# Patient Record
Sex: FEMALE | Race: WHITE | Employment: OTHER | URBAN - METROPOLITAN AREA
[De-identification: names, ages, dates, MRNs, and addresses within clinical notes are randomized per-mention and may not be internally consistent; named-entity substitution may affect disease eponyms.]

---

## 2017-02-14 ENCOUNTER — HOSPITAL ENCOUNTER (OUTPATIENT)
Dept: GENERAL RADIOLOGY | Age: 77
Discharge: HOME | End: 2017-02-14
Attending: INTERNAL MEDICINE
Payer: MEDICARE

## 2017-02-14 ENCOUNTER — TELEPHONE (OUTPATIENT)
Dept: INTERNAL MEDICINE CLINIC | Facility: CLINIC | Age: 77
End: 2017-02-14

## 2017-02-14 ENCOUNTER — HOSPITAL ENCOUNTER (OUTPATIENT)
Dept: GENERAL RADIOLOGY | Age: 77
Discharge: HOME | End: 2017-02-14
Attending: INTERNAL MEDICINE | Admitting: INTERNAL MEDICINE
Payer: MEDICARE

## 2017-02-14 ENCOUNTER — OFFICE VISIT (OUTPATIENT)
Dept: INTERNAL MEDICINE CLINIC | Facility: CLINIC | Age: 77
End: 2017-02-14

## 2017-02-14 ENCOUNTER — HOSPITAL ENCOUNTER (OUTPATIENT)
Dept: ULTRASOUND IMAGING | Facility: HOSPITAL | Age: 77
Discharge: HOME | End: 2017-02-14
Attending: INTERNAL MEDICINE
Payer: MEDICARE

## 2017-02-14 VITALS
TEMPERATURE: 98 F | WEIGHT: 187.38 LBS | HEART RATE: 79 BPM | BODY MASS INDEX: 33.2 KG/M2 | HEIGHT: 63 IN | OXYGEN SATURATION: 95 % | SYSTOLIC BLOOD PRESSURE: 132 MMHG | DIASTOLIC BLOOD PRESSURE: 84 MMHG

## 2017-02-14 DIAGNOSIS — M25.562 CHRONIC PAIN OF LEFT KNEE: ICD-10-CM

## 2017-02-14 DIAGNOSIS — G89.29 CHRONIC PAIN OF LEFT KNEE: ICD-10-CM

## 2017-02-14 DIAGNOSIS — I26.99 OTHER PULMONARY EMBOLISM WITHOUT ACUTE COR PULMONALE, UNSPECIFIED CHRONICITY (HCC): ICD-10-CM

## 2017-02-14 DIAGNOSIS — M25.461 BILATERAL KNEE SWELLING: ICD-10-CM

## 2017-02-14 DIAGNOSIS — M25.462 BILATERAL KNEE SWELLING: ICD-10-CM

## 2017-02-14 DIAGNOSIS — I10 HYPERTENSION, BENIGN: ICD-10-CM

## 2017-02-14 DIAGNOSIS — M25.562 PAIN, JOINT, KNEE, LEFT: ICD-10-CM

## 2017-02-14 DIAGNOSIS — Z86.718 HISTORY OF DVT (DEEP VEIN THROMBOSIS): ICD-10-CM

## 2017-02-14 DIAGNOSIS — Z86.718 HISTORY OF DVT (DEEP VEIN THROMBOSIS): Primary | ICD-10-CM

## 2017-02-14 DIAGNOSIS — M25.562 ACUTE PAIN OF LEFT KNEE: ICD-10-CM

## 2017-02-14 PROCEDURE — G0463 HOSPITAL OUTPT CLINIC VISIT: HCPCS | Performed by: INTERNAL MEDICINE

## 2017-02-14 PROCEDURE — 20610 DRAIN/INJ JOINT/BURSA W/O US: CPT | Performed by: INTERNAL MEDICINE

## 2017-02-14 PROCEDURE — 99214 OFFICE O/P EST MOD 30 MIN: CPT | Performed by: INTERNAL MEDICINE

## 2017-02-14 PROCEDURE — 93970 EXTREMITY STUDY: CPT

## 2017-02-14 PROCEDURE — 73562 X-RAY EXAM OF KNEE 3: CPT

## 2017-02-14 NOTE — TELEPHONE ENCOUNTER
Pt had x-ray of left knee the technician told pt she thought knee was swollen, pt is concerned because she has had a blood clot in that leg in the past she is experiencing pain.

## 2017-02-14 NOTE — TELEPHONE ENCOUNTER
[facility] called asking to clarify orders of knees. XR left knee (4 views) and XR right knee (3 views) was ordered. Per XR tech, typically 3 views is only done. Did Dr. Alves Going want to specifically do 4 views L knee for a particular reason?  Reviewed Dr. Julianne Thao no

## 2017-02-15 NOTE — PROGRESS NOTES
Marquis Morgan is a 78year old female. HPI:   She has had progressive pain in the lateral left knee, much worse today. She thought there was swelling in the knee and some of the pain went down into the left calf. No pain in the right knee.  . She has hist Social History:    Smoking Status: Never Smoker                      Smokeless Status: Never Used                        Alcohol Use: No                 REVIEW OF SYSTEMS:   GENERAL HEALTH: feels well otherwise  SKIN: denies any unusual skin lesions or r

## 2017-02-17 RX ORDER — NIFEDIPINE 60 MG/1
TABLET, FILM COATED, EXTENDED RELEASE ORAL
Qty: 90 TABLET | Refills: 3 | Status: SHIPPED | OUTPATIENT
Start: 2017-02-17 | End: 2018-03-09

## 2017-02-21 ENCOUNTER — PRIOR ORIGINAL RECORDS (OUTPATIENT)
Dept: OTHER | Age: 77
End: 2017-02-21

## 2017-03-07 ENCOUNTER — TELEPHONE (OUTPATIENT)
Dept: INTERNAL MEDICINE CLINIC | Facility: CLINIC | Age: 77
End: 2017-03-07

## 2017-03-07 DIAGNOSIS — Z12.31 ENCOUNTER FOR SCREENING MAMMOGRAM FOR BREAST CANCER: Primary | ICD-10-CM

## 2017-03-10 NOTE — TELEPHONE ENCOUNTER
Pt called for update on mammo order.  Told pt that order has been completed and ready to call to corin

## 2017-03-14 ENCOUNTER — HOSPITAL ENCOUNTER (OUTPATIENT)
Dept: MAMMOGRAPHY | Age: 77
Discharge: HOME | End: 2017-03-14
Attending: INTERNAL MEDICINE
Payer: MEDICARE

## 2017-03-14 DIAGNOSIS — Z12.31 ENCOUNTER FOR SCREENING MAMMOGRAM FOR BREAST CANCER: ICD-10-CM

## 2017-03-14 PROCEDURE — 77067 SCR MAMMO BI INCL CAD: CPT

## 2017-04-12 ENCOUNTER — TELEPHONE (OUTPATIENT)
Dept: INTERNAL MEDICINE CLINIC | Facility: CLINIC | Age: 77
End: 2017-04-12

## 2017-04-12 ENCOUNTER — LAB ENCOUNTER (OUTPATIENT)
Dept: LAB | Facility: HOSPITAL | Age: 77
End: 2017-04-12
Attending: INTERNAL MEDICINE
Payer: MEDICARE

## 2017-04-12 DIAGNOSIS — E78.00 HYPERCHOLESTEROLEMIA: ICD-10-CM

## 2017-04-12 DIAGNOSIS — N39.0 URINARY TRACT INFECTION WITHOUT HEMATURIA, SITE UNSPECIFIED: Primary | ICD-10-CM

## 2017-04-12 DIAGNOSIS — I26.99 OTHER ACUTE PULMONARY EMBOLISM WITHOUT ACUTE COR PULMONALE (HCC): ICD-10-CM

## 2017-04-12 DIAGNOSIS — I10 HYPERTENSION, BENIGN: ICD-10-CM

## 2017-04-12 DIAGNOSIS — K22.70 BARRETT'S ESOPHAGUS WITHOUT DYSPLASIA: ICD-10-CM

## 2017-04-12 PROCEDURE — 36415 COLL VENOUS BLD VENIPUNCTURE: CPT

## 2017-04-12 PROCEDURE — 85379 FIBRIN DEGRADATION QUANT: CPT

## 2017-04-12 PROCEDURE — 80061 LIPID PANEL: CPT

## 2017-04-12 PROCEDURE — 80053 COMPREHEN METABOLIC PANEL: CPT

## 2017-04-12 PROCEDURE — 84443 ASSAY THYROID STIM HORMONE: CPT

## 2017-04-12 PROCEDURE — 85025 COMPLETE CBC W/AUTO DIFF WBC: CPT

## 2017-04-12 PROCEDURE — 81001 URINALYSIS AUTO W/SCOPE: CPT

## 2017-04-12 NOTE — TELEPHONE ENCOUNTER
Imp- pyuria; urinalysis with +Pyuria; urine culture ordered; does pt have +urine frequency, +dysuria, or +polyuria; if yes, after urine culture submitted, may give Macrobid 100 mg po BID x7d and then may call Rx; if negative urinary review of systems, then

## 2017-04-12 NOTE — TELEPHONE ENCOUNTER
Called patient , as RN started talking call got disconnected. Called again and relayed DR[facility] MANCERA message [facility]

## 2017-04-12 NOTE — TELEPHONE ENCOUNTER
Called patient who states she does not have UTI systems - will go for Urine culture and await result

## 2017-04-13 ENCOUNTER — TELEPHONE (OUTPATIENT)
Dept: INTERNAL MEDICINE CLINIC | Facility: CLINIC | Age: 77
End: 2017-04-13

## 2017-04-13 NOTE — TELEPHONE ENCOUNTER
Pt returned call and was given lab results. Also instructed to call Dr. Elder Blackman next week to see if he wants repeat lab work done. Pt scheduled an appt with Dr. Amber Montez for 4/17/2017.

## 2017-04-13 NOTE — TELEPHONE ENCOUNTER
Blood sugar slightly elevated - just watch conc sweets and excessive carbs.     Blood clot test (d-dimer) is elevated - send copy of labs to Dr Hollie Patton, [address]  (see Onc consults in Media from last Fall for Fax, etc)  - ask patient to call Dr Barbara Long

## 2017-04-13 NOTE — TELEPHONE ENCOUNTER
Copy of patients 4/12/17 lab results faxed to DR. Jurgen Romero ([facility]) at [phone].  Left message with family member for patient to call us back (was told patient at [address] answer at cell number)

## 2017-04-14 ENCOUNTER — APPOINTMENT (OUTPATIENT)
Dept: LAB | Facility: HOSPITAL | Age: 77
End: 2017-04-14
Attending: INTERNAL MEDICINE
Payer: MEDICARE

## 2017-04-14 DIAGNOSIS — N39.0 URINARY TRACT INFECTION WITHOUT HEMATURIA, SITE UNSPECIFIED: ICD-10-CM

## 2017-04-14 PROCEDURE — 87086 URINE CULTURE/COLONY COUNT: CPT

## 2017-04-17 ENCOUNTER — OFFICE VISIT (OUTPATIENT)
Dept: INTERNAL MEDICINE CLINIC | Facility: CLINIC | Age: 77
End: 2017-04-17

## 2017-04-17 VITALS
BODY MASS INDEX: 32.78 KG/M2 | HEIGHT: 63 IN | OXYGEN SATURATION: 98 % | SYSTOLIC BLOOD PRESSURE: 128 MMHG | HEART RATE: 80 BPM | DIASTOLIC BLOOD PRESSURE: 76 MMHG | WEIGHT: 185 LBS | TEMPERATURE: 98 F

## 2017-04-17 DIAGNOSIS — M54.50 ACUTE MIDLINE LOW BACK PAIN WITHOUT SCIATICA: ICD-10-CM

## 2017-04-17 DIAGNOSIS — I10 HYPERTENSION, BENIGN: ICD-10-CM

## 2017-04-17 DIAGNOSIS — I26.99 OTHER PULMONARY EMBOLISM WITHOUT ACUTE COR PULMONALE, UNSPECIFIED CHRONICITY (HCC): Primary | ICD-10-CM

## 2017-04-17 DIAGNOSIS — E78.00 HYPERCHOLESTEROLEMIA: ICD-10-CM

## 2017-04-17 PROCEDURE — G0463 HOSPITAL OUTPT CLINIC VISIT: HCPCS | Performed by: INTERNAL MEDICINE

## 2017-04-17 PROCEDURE — 99214 OFFICE O/P EST MOD 30 MIN: CPT | Performed by: INTERNAL MEDICINE

## 2017-04-17 NOTE — PROGRESS NOTES
Perlita Gonzáles is a 78year old female. HPI:   Generally she is feeling well - only complaint is low back pain that started about 1 week ago - no fall or injury - no radiation of pain. SR: no chest pain or sob, no gu or gi sx.         1. Other pulmona denies chest pain on exertion  GI: denies abdominal pain and denies heartburn  NEURO: denies headaches    EXAM:   /76 mmHg  Pulse 80  Temp(Src) 97.8 °F (36.6 °C) (Oral)  Ht 5' 3\" (1.6 m)  Wt 185 lb (83.915 kg)  BMI 32.78 kg/m2  SpO2 98%  GENERAL: we

## 2017-05-01 RX ORDER — POTASSIUM CHLORIDE 750 MG/1
10 TABLET, EXTENDED RELEASE ORAL EVERY OTHER DAY
Qty: 45 TABLET | Refills: 1 | Status: SHIPPED | OUTPATIENT
Start: 2017-05-01 | End: 2017-10-19

## 2017-05-01 NOTE — TELEPHONE ENCOUNTER
[phone]  Pt states she is taking Potassium once every other day.  Need new script sent to [facility]  To Rx

## 2017-05-01 NOTE — TELEPHONE ENCOUNTER
Per 10/21/16 office visit note,  requested that patient \"resume KCl 10 meq every other day\". Refill sent.

## 2017-05-10 ENCOUNTER — PRIOR ORIGINAL RECORDS (OUTPATIENT)
Dept: OTHER | Age: 77
End: 2017-05-10

## 2017-05-10 ASSESSMENT — PAIN SCALES - GENERAL: PAINLEVEL_OUTOF10: 0

## 2017-06-06 ENCOUNTER — TELEPHONE (OUTPATIENT)
Dept: INTERNAL MEDICINE CLINIC | Facility: CLINIC | Age: 77
End: 2017-06-06

## 2017-06-06 NOTE — TELEPHONE ENCOUNTER
To Dr. Juan Monsivais, please advise    Pt states of pain in the same area, rates as 7 out of 10 pain level but this is not consistent. She will be leaving for a cruise in [place] in a week and a half.  She will be gone for 2 weeks

## 2017-06-06 NOTE — TELEPHONE ENCOUNTER
Pt had a shot in knee by dr. Demian Quintana in February     Pt is calling asking - is it too soon to get another shot?      plz call and let her know if she can come in or not     [phone]

## 2017-06-06 NOTE — TELEPHONE ENCOUNTER
Patient contacted, she will see  at the [facility] location on Friday at [address].

## 2017-06-09 ENCOUNTER — OFFICE VISIT (OUTPATIENT)
Dept: INTERNAL MEDICINE CLINIC | Facility: CLINIC | Age: 77
End: 2017-06-09

## 2017-06-09 VITALS
HEIGHT: 63 IN | DIASTOLIC BLOOD PRESSURE: 80 MMHG | OXYGEN SATURATION: 93 % | WEIGHT: 187 LBS | TEMPERATURE: 99 F | HEART RATE: 74 BPM | SYSTOLIC BLOOD PRESSURE: 142 MMHG | BODY MASS INDEX: 33.13 KG/M2

## 2017-06-09 DIAGNOSIS — I10 HYPERTENSION, BENIGN: ICD-10-CM

## 2017-06-09 DIAGNOSIS — M25.562 ACUTE PAIN OF LEFT KNEE: Primary | ICD-10-CM

## 2017-06-09 PROCEDURE — 99213 OFFICE O/P EST LOW 20 MIN: CPT | Performed by: INTERNAL MEDICINE

## 2017-06-09 RX ORDER — SCOLOPAMINE TRANSDERMAL SYSTEM 1 MG/1
1 PATCH, EXTENDED RELEASE TRANSDERMAL
Qty: 4 PATCH | Refills: 1 | Status: SHIPPED | OUTPATIENT
Start: 2017-06-09 | End: 2017-09-19

## 2017-06-09 RX ORDER — OMEPRAZOLE 10 MG/1
10 CAPSULE, DELAYED RELEASE ORAL DAILY
COMMUNITY
End: 2017-09-19

## 2017-06-09 NOTE — PROGRESS NOTES
Autumn Bowman is a 78year old female. HPI:   1. Acute pain of left knee - pain left lateral knee, can radiate to top of knee and even medial knee. She had excellent response to steroid injection 4 mo ago and seeks another injection      2.  Hypertension, with exertion  CARDIOVASCULAR: denies chest pain on exertion  GI: denies abdominal pain and denies heartburn  NEURO: denies headaches    EXAM:   /80 mmHg  Pulse 74  Temp(Src) 98.9 °F (37.2 °C) (Oral)  Ht 5' 3\" (1.6 m)  Wt 187 lb (84.823 kg)  BMI 33.

## 2017-06-12 ENCOUNTER — TELEPHONE (OUTPATIENT)
Dept: INTERNAL MEDICINE CLINIC | Facility: CLINIC | Age: 77
End: 2017-06-12

## 2017-09-19 ENCOUNTER — LAB ENCOUNTER (OUTPATIENT)
Dept: LAB | Age: 77
End: 2017-09-19
Attending: INTERNAL MEDICINE
Payer: MEDICARE

## 2017-09-19 ENCOUNTER — OFFICE VISIT (OUTPATIENT)
Dept: INTERNAL MEDICINE CLINIC | Facility: CLINIC | Age: 77
End: 2017-09-19

## 2017-09-19 VITALS
WEIGHT: 182 LBS | TEMPERATURE: 98 F | SYSTOLIC BLOOD PRESSURE: 128 MMHG | DIASTOLIC BLOOD PRESSURE: 82 MMHG | HEART RATE: 84 BPM | HEIGHT: 63 IN | BODY MASS INDEX: 32.25 KG/M2

## 2017-09-19 DIAGNOSIS — Z23 NEED FOR VACCINATION: ICD-10-CM

## 2017-09-19 DIAGNOSIS — E78.00 HYPERCHOLESTEROLEMIA: ICD-10-CM

## 2017-09-19 DIAGNOSIS — I10 HYPERTENSION, BENIGN: Primary | ICD-10-CM

## 2017-09-19 DIAGNOSIS — K22.719 BARRETT'S ESOPHAGUS WITH DYSPLASIA: ICD-10-CM

## 2017-09-19 LAB
ANION GAP SERPL CALC-SCNC: 11 MMOL/L (ref 0–18)
BASOPHILS # BLD: 0.1 K/UL (ref 0–0.2)
BASOPHILS NFR BLD: 1 %
BUN SERPL-MCNC: 26 MG/DL (ref 8–20)
BUN/CREAT SERPL: 24.5 (ref 10–20)
CALCIUM SERPL-MCNC: 9.1 MG/DL (ref 8.5–10.5)
CHLORIDE SERPL-SCNC: 104 MMOL/L (ref 95–110)
CHOLEST SERPL-MCNC: 177 MG/DL (ref 110–200)
CO2 SERPL-SCNC: 25 MMOL/L (ref 22–32)
CREAT SERPL-MCNC: 1.06 MG/DL (ref 0.5–1.5)
EOSINOPHIL # BLD: 0.2 K/UL (ref 0–0.7)
EOSINOPHIL NFR BLD: 3 %
ERYTHROCYTE [DISTWIDTH] IN BLOOD BY AUTOMATED COUNT: 13.5 % (ref 11–15)
GLUCOSE SERPL-MCNC: 108 MG/DL (ref 70–99)
HCT VFR BLD AUTO: 40.1 % (ref 35–48)
HDLC SERPL-MCNC: 64 MG/DL
HGB BLD-MCNC: 13.8 G/DL (ref 12–16)
LDLC SERPL CALC-MCNC: 99 MG/DL (ref 0–99)
LYMPHOCYTES # BLD: 1.7 K/UL (ref 1–4)
LYMPHOCYTES NFR BLD: 22 %
MAGNESIUM SERPL-MCNC: 1.9 MG/DL (ref 1.8–2.5)
MCH RBC QN AUTO: 32.5 PG (ref 27–32)
MCHC RBC AUTO-ENTMCNC: 34.4 G/DL (ref 32–37)
MCV RBC AUTO: 94.5 FL (ref 80–100)
MONOCYTES # BLD: 0.8 K/UL (ref 0–1)
MONOCYTES NFR BLD: 10 %
NEUTROPHILS # BLD AUTO: 4.9 K/UL (ref 1.8–7.7)
NEUTROPHILS NFR BLD: 64 %
NONHDLC SERPL-MCNC: 113 MG/DL
OSMOLALITY UR CALC.SUM OF ELEC: 295 MOSM/KG (ref 275–295)
PLATELET # BLD AUTO: 300 K/UL (ref 140–400)
PMV BLD AUTO: 8 FL (ref 7.4–10.3)
POTASSIUM SERPL-SCNC: 3.3 MMOL/L (ref 3.3–5.1)
RBC # BLD AUTO: 4.24 M/UL (ref 3.7–5.4)
SODIUM SERPL-SCNC: 140 MMOL/L (ref 136–144)
TRIGL SERPL-MCNC: 70 MG/DL (ref 1–149)
TSH SERPL-ACNC: 3.69 UIU/ML (ref 0.45–5.33)
WBC # BLD AUTO: 7.7 K/UL (ref 4–11)

## 2017-09-19 PROCEDURE — 80048 BASIC METABOLIC PNL TOTAL CA: CPT

## 2017-09-19 PROCEDURE — 83735 ASSAY OF MAGNESIUM: CPT

## 2017-09-19 PROCEDURE — 36415 COLL VENOUS BLD VENIPUNCTURE: CPT

## 2017-09-19 PROCEDURE — G0463 HOSPITAL OUTPT CLINIC VISIT: HCPCS | Performed by: INTERNAL MEDICINE

## 2017-09-19 PROCEDURE — 85025 COMPLETE CBC W/AUTO DIFF WBC: CPT

## 2017-09-19 PROCEDURE — G0008 ADMIN INFLUENZA VIRUS VAC: HCPCS | Performed by: INTERNAL MEDICINE

## 2017-09-19 PROCEDURE — 84443 ASSAY THYROID STIM HORMONE: CPT

## 2017-09-19 PROCEDURE — 80061 LIPID PANEL: CPT

## 2017-09-19 PROCEDURE — 90686 IIV4 VACC NO PRSV 0.5 ML IM: CPT | Performed by: INTERNAL MEDICINE

## 2017-09-19 PROCEDURE — 99214 OFFICE O/P EST MOD 30 MIN: CPT | Performed by: INTERNAL MEDICINE

## 2017-09-19 RX ORDER — OMEPRAZOLE 20 MG/1
1 CAPSULE, DELAYED RELEASE ORAL DAILY
Refills: 2 | COMMUNITY
Start: 2017-07-24 | End: 2018-02-05

## 2017-09-19 NOTE — PROGRESS NOTES
Randy Helton is a 78year old female. HPI:   She has been feeling well and has no major complaints, no ED or UC visits. Good energy good appetite. SR: no chest pain or sob, no gu or gi sx.  She wants a mole checked lateral to the right breast. uric acid in blood 2009    10.4   • HYPERTENSION    • Pulmonary emboli [facility]) May/June 2015   • Renal insufficiency 2009   • S/P colonoscopy 1/12/2015      Social History:  Smoking status: Never Smoker

## 2017-09-25 ENCOUNTER — TELEPHONE (OUTPATIENT)
Dept: INTERNAL MEDICINE CLINIC | Facility: CLINIC | Age: 77
End: 2017-09-25

## 2017-09-25 NOTE — TELEPHONE ENCOUNTER
All labs including CBC, CMP, lipids, TSH, thyroid and urine all normal - continue same meds same meds

## 2017-10-19 RX ORDER — POTASSIUM CHLORIDE 750 MG/1
TABLET, EXTENDED RELEASE ORAL
Qty: 45 TABLET | Refills: 3 | Status: SHIPPED | OUTPATIENT
Start: 2017-10-19 | End: 2018-10-11

## 2017-11-14 RX ORDER — NIFEDIPINE 60 MG/1
TABLET, EXTENDED RELEASE ORAL
Qty: 90 TABLET | Refills: 0 | OUTPATIENT
Start: 2017-11-14

## 2017-11-24 RX ORDER — ATORVASTATIN CALCIUM 10 MG/1
TABLET, FILM COATED ORAL
Qty: 90 TABLET | Refills: 3 | Status: SHIPPED | OUTPATIENT
Start: 2017-11-24 | End: 2018-11-08

## 2017-11-24 RX ORDER — METOPROLOL SUCCINATE 100 MG/1
TABLET, EXTENDED RELEASE ORAL
Qty: 90 TABLET | Refills: 3 | Status: SHIPPED | OUTPATIENT
Start: 2017-11-24 | End: 2018-11-08

## 2017-12-01 ENCOUNTER — APPOINTMENT (OUTPATIENT)
Dept: LAB | Facility: HOSPITAL | Age: 77
End: 2017-12-01
Attending: INTERNAL MEDICINE
Payer: MEDICARE

## 2017-12-01 DIAGNOSIS — N18.30 CHRONIC KIDNEY DISEASE, STAGE III (MODERATE) (HCC): ICD-10-CM

## 2017-12-01 PROCEDURE — 36415 COLL VENOUS BLD VENIPUNCTURE: CPT

## 2017-12-01 PROCEDURE — 80069 RENAL FUNCTION PANEL: CPT

## 2017-12-05 ENCOUNTER — HOSPITAL ENCOUNTER (OUTPATIENT)
Dept: ULTRASOUND IMAGING | Facility: HOSPITAL | Age: 77
Discharge: HOME | End: 2017-12-05
Attending: INTERNAL MEDICINE
Payer: MEDICARE

## 2017-12-05 ENCOUNTER — APPOINTMENT (OUTPATIENT)
Dept: LAB | Facility: HOSPITAL | Age: 77
End: 2017-12-05
Attending: INTERNAL MEDICINE
Payer: MEDICARE

## 2017-12-05 DIAGNOSIS — N17.9 ACUTE RENAL FAILURE, UNSPECIFIED ACUTE RENAL FAILURE TYPE (HCC): ICD-10-CM

## 2017-12-05 DIAGNOSIS — E78.5 HYPERLIPIDEMIA, UNSPECIFIED HYPERLIPIDEMIA TYPE: ICD-10-CM

## 2017-12-05 DIAGNOSIS — I10 ESSENTIAL HYPERTENSION: ICD-10-CM

## 2017-12-05 DIAGNOSIS — K22.719 BARRETT'S ESOPHAGUS WITH DYSPLASIA: ICD-10-CM

## 2017-12-05 DIAGNOSIS — E87.6 HYPOKALEMIA: ICD-10-CM

## 2017-12-05 PROCEDURE — 36415 COLL VENOUS BLD VENIPUNCTURE: CPT

## 2017-12-05 PROCEDURE — 76770 US EXAM ABDO BACK WALL COMP: CPT | Performed by: INTERNAL MEDICINE

## 2017-12-05 PROCEDURE — 81001 URINALYSIS AUTO W/SCOPE: CPT

## 2017-12-05 PROCEDURE — 82550 ASSAY OF CK (CPK): CPT

## 2017-12-05 PROCEDURE — 80069 RENAL FUNCTION PANEL: CPT

## 2018-01-17 ENCOUNTER — APPOINTMENT (OUTPATIENT)
Dept: LAB | Facility: HOSPITAL | Age: 78
End: 2018-01-17
Attending: INTERNAL MEDICINE
Payer: MEDICARE

## 2018-01-17 DIAGNOSIS — N17.9 ACUTE RENAL FAILURE, UNSPECIFIED ACUTE RENAL FAILURE TYPE (HCC): ICD-10-CM

## 2018-01-17 DIAGNOSIS — R31.29 MICROSCOPIC HEMATURIA: ICD-10-CM

## 2018-01-17 LAB
ALBUMIN SERPL BCP-MCNC: 3.7 G/DL (ref 3.5–4.8)
ANION GAP SERPL CALC-SCNC: 9 MMOL/L (ref 0–18)
BILIRUB UR QL: NEGATIVE
BUN SERPL-MCNC: 20 MG/DL (ref 8–20)
BUN/CREAT SERPL: 16.4 (ref 10–20)
CALCIUM SERPL-MCNC: 9.2 MG/DL (ref 8.5–10.5)
CHLORIDE SERPL-SCNC: 105 MMOL/L (ref 95–110)
CO2 SERPL-SCNC: 26 MMOL/L (ref 22–32)
COLOR UR: YELLOW
CREAT SERPL-MCNC: 1.22 MG/DL (ref 0.5–1.5)
GLUCOSE SERPL-MCNC: 108 MG/DL (ref 70–99)
GLUCOSE UR-MCNC: 50 MG/DL
HYALINE CASTS #/AREA URNS AUTO: 23 /LPF
KETONES UR-MCNC: NEGATIVE MG/DL
NITRITE UR QL STRIP.AUTO: NEGATIVE
OSMOLALITY UR CALC.SUM OF ELEC: 293 MOSM/KG (ref 275–295)
PH UR: 5 [PH] (ref 5–8)
PHOSPHATE SERPL-MCNC: 3.4 MG/DL (ref 2.4–4.7)
POTASSIUM SERPL-SCNC: 3.3 MMOL/L (ref 3.3–5.1)
PROT UR-MCNC: 30 MG/DL
RBC #/AREA URNS AUTO: 18 /HPF
SODIUM SERPL-SCNC: 140 MMOL/L (ref 136–144)
SP GR UR STRIP: 1.02 (ref 1–1.03)
UROBILINOGEN UR STRIP-ACNC: 2
VIT C UR-MCNC: 20 MG/DL
WBC #/AREA URNS AUTO: 140 /HPF

## 2018-01-17 PROCEDURE — 80069 RENAL FUNCTION PANEL: CPT

## 2018-01-17 PROCEDURE — 36415 COLL VENOUS BLD VENIPUNCTURE: CPT

## 2018-01-17 PROCEDURE — 81001 URINALYSIS AUTO W/SCOPE: CPT

## 2018-01-26 ENCOUNTER — TELEPHONE (OUTPATIENT)
Dept: INTERNAL MEDICINE CLINIC | Facility: CLINIC | Age: 78
End: 2018-01-26

## 2018-01-26 DIAGNOSIS — M15.9 PRIMARY OSTEOARTHRITIS INVOLVING MULTIPLE JOINTS: ICD-10-CM

## 2018-01-26 DIAGNOSIS — E78.00 HYPERCHOLESTEREMIA: Primary | ICD-10-CM

## 2018-01-26 NOTE — TELEPHONE ENCOUNTER
Patient has a 4 mo check up on 2/5 - was told to get labs done before. Needs an order put in the system & a call when this is completed.

## 2018-01-30 ENCOUNTER — LAB ENCOUNTER (OUTPATIENT)
Dept: LAB | Facility: HOSPITAL | Age: 78
End: 2018-01-30
Attending: INTERNAL MEDICINE
Payer: MEDICARE

## 2018-01-30 DIAGNOSIS — M15.9 PRIMARY OSTEOARTHRITIS INVOLVING MULTIPLE JOINTS: ICD-10-CM

## 2018-01-30 DIAGNOSIS — E78.00 HYPERCHOLESTEREMIA: ICD-10-CM

## 2018-01-30 LAB
BASOPHILS # BLD: 0 K/UL (ref 0–0.2)
BASOPHILS NFR BLD: 1 %
CHOLEST SERPL-MCNC: 175 MG/DL (ref 110–200)
EOSINOPHIL # BLD: 0.3 K/UL (ref 0–0.7)
EOSINOPHIL NFR BLD: 5 %
ERYTHROCYTE [DISTWIDTH] IN BLOOD BY AUTOMATED COUNT: 13.1 % (ref 11–15)
HCT VFR BLD AUTO: 39.8 % (ref 35–48)
HDLC SERPL-MCNC: 55 MG/DL
HGB BLD-MCNC: 13.5 G/DL (ref 12–16)
LDLC SERPL CALC-MCNC: 103 MG/DL (ref 0–99)
LYMPHOCYTES # BLD: 1.6 K/UL (ref 1–4)
LYMPHOCYTES NFR BLD: 26 %
MCH RBC QN AUTO: 31.8 PG (ref 27–32)
MCHC RBC AUTO-ENTMCNC: 33.9 G/DL (ref 32–37)
MCV RBC AUTO: 93.7 FL (ref 80–100)
MONOCYTES # BLD: 0.7 K/UL (ref 0–1)
MONOCYTES NFR BLD: 12 %
NEUTROPHILS # BLD AUTO: 3.4 K/UL (ref 1.8–7.7)
NEUTROPHILS NFR BLD: 56 %
NONHDLC SERPL-MCNC: 120 MG/DL
PLATELET # BLD AUTO: 289 K/UL (ref 140–400)
PMV BLD AUTO: 8.1 FL (ref 7.4–10.3)
RBC # BLD AUTO: 4.25 M/UL (ref 3.7–5.4)
TRIGL SERPL-MCNC: 85 MG/DL (ref 1–149)
TSH SERPL-ACNC: 3.96 UIU/ML (ref 0.45–5.33)
WBC # BLD AUTO: 6.1 K/UL (ref 4–11)

## 2018-01-30 PROCEDURE — 84443 ASSAY THYROID STIM HORMONE: CPT

## 2018-01-30 PROCEDURE — 36415 COLL VENOUS BLD VENIPUNCTURE: CPT

## 2018-01-30 PROCEDURE — 85025 COMPLETE CBC W/AUTO DIFF WBC: CPT

## 2018-01-30 PROCEDURE — 80061 LIPID PANEL: CPT

## 2018-02-05 ENCOUNTER — OFFICE VISIT (OUTPATIENT)
Dept: INTERNAL MEDICINE CLINIC | Facility: CLINIC | Age: 78
End: 2018-02-05

## 2018-02-05 VITALS
TEMPERATURE: 98 F | WEIGHT: 178.81 LBS | HEIGHT: 63 IN | BODY MASS INDEX: 31.68 KG/M2 | SYSTOLIC BLOOD PRESSURE: 120 MMHG | DIASTOLIC BLOOD PRESSURE: 88 MMHG | HEART RATE: 72 BPM | OXYGEN SATURATION: 98 %

## 2018-02-05 DIAGNOSIS — N28.9 DISORDER OF KIDNEY AND URETER: ICD-10-CM

## 2018-02-05 DIAGNOSIS — Z78.0 POSTMENOPAUSAL STATUS: ICD-10-CM

## 2018-02-05 DIAGNOSIS — E78.00 HYPERCHOLESTEROLEMIA: ICD-10-CM

## 2018-02-05 DIAGNOSIS — I10 HYPERTENSION, BENIGN: Primary | ICD-10-CM

## 2018-02-05 DIAGNOSIS — K22.719 BARRETT'S ESOPHAGUS WITH DYSPLASIA: ICD-10-CM

## 2018-02-05 DIAGNOSIS — Z12.39 BREAST CANCER SCREENING: ICD-10-CM

## 2018-02-05 PROCEDURE — G0463 HOSPITAL OUTPT CLINIC VISIT: HCPCS | Performed by: INTERNAL MEDICINE

## 2018-02-05 PROCEDURE — 99214 OFFICE O/P EST MOD 30 MIN: CPT | Performed by: INTERNAL MEDICINE

## 2018-02-05 RX ORDER — RANITIDINE 150 MG/1
150 CAPSULE ORAL 2 TIMES DAILY
Refills: 6 | COMMUNITY
Start: 2017-12-04 | End: 2018-03-09

## 2018-02-05 NOTE — PROGRESS NOTES
Mario Clarke is a 78year old female. HPI:   She has been doing well and feeling well but did have a brief and unexplained drop in GFR from 50 to 27 and now back to 44 over a several day period. Omeprazole was changed to ranitidine.  She has no major com History:  Smoking status: Never Smoker                                                              Smokeless tobacco: Never Used                      Alcohol use:  No                 REVIEW OF SYSTEMS:   GENERAL HEALTH: feels well otherwise  SKIN: denies a

## 2018-02-13 RX ORDER — NIFEDIPINE 60 MG/1
TABLET, EXTENDED RELEASE ORAL
Qty: 90 TABLET | Refills: 3 | Status: SHIPPED | OUTPATIENT
Start: 2018-02-13 | End: 2018-11-08

## 2018-03-09 ENCOUNTER — OFFICE VISIT (OUTPATIENT)
Dept: INTERNAL MEDICINE CLINIC | Facility: CLINIC | Age: 78
End: 2018-03-09

## 2018-03-09 VITALS
BODY MASS INDEX: 31.54 KG/M2 | HEART RATE: 87 BPM | DIASTOLIC BLOOD PRESSURE: 88 MMHG | TEMPERATURE: 98 F | OXYGEN SATURATION: 98 % | WEIGHT: 178 LBS | HEIGHT: 63 IN | SYSTOLIC BLOOD PRESSURE: 142 MMHG

## 2018-03-09 DIAGNOSIS — I10 HYPERTENSION, BENIGN: ICD-10-CM

## 2018-03-09 DIAGNOSIS — M54.2 ANTERIOR NECK PAIN: Primary | ICD-10-CM

## 2018-03-09 PROCEDURE — 99213 OFFICE O/P EST LOW 20 MIN: CPT | Performed by: INTERNAL MEDICINE

## 2018-03-09 NOTE — PROGRESS NOTES
Freeman Canseco is a 78year old female. HPI:   1. Anterior neck pain  She has intermittent tenderness in th right anterior cervical neck - sometimes painful without touching, othertimes not. No pain with swallowing. No fever or chills.  Does not feel like Smokeless tobacco: Never Used                      Alcohol use:  No                 REVIEW OF SYSTEMS:   GENERAL HEALTH: feels well otherwise  SKIN: denies any unusual skin lesions or rashes  RESPIRATORY: denies shortness of breath with exertion  CARDIO

## 2018-04-03 ENCOUNTER — HOSPITAL ENCOUNTER (OUTPATIENT)
Dept: BONE DENSITY | Age: 78
Discharge: HOME | End: 2018-04-03
Attending: INTERNAL MEDICINE
Payer: MEDICARE

## 2018-04-03 ENCOUNTER — HOSPITAL ENCOUNTER (OUTPATIENT)
Dept: MAMMOGRAPHY | Age: 78
Discharge: HOME | End: 2018-04-03
Attending: INTERNAL MEDICINE
Payer: MEDICARE

## 2018-04-03 DIAGNOSIS — Z12.39 BREAST CANCER SCREENING: ICD-10-CM

## 2018-04-03 DIAGNOSIS — Z78.0 POSTMENOPAUSAL STATUS: ICD-10-CM

## 2018-04-03 PROCEDURE — 77067 SCR MAMMO BI INCL CAD: CPT | Performed by: INTERNAL MEDICINE

## 2018-04-03 PROCEDURE — 77080 DXA BONE DENSITY AXIAL: CPT | Performed by: INTERNAL MEDICINE

## 2018-04-14 ENCOUNTER — TELEPHONE (OUTPATIENT)
Dept: INTERNAL MEDICINE CLINIC | Facility: CLINIC | Age: 78
End: 2018-04-14

## 2018-04-14 NOTE — TELEPHONE ENCOUNTER
DEXA scan looks good  - no osteoporosis.  Bone density normal although down about 5 % since last scan - continue OsCal bid

## 2018-05-03 PROCEDURE — 88305 TISSUE EXAM BY PATHOLOGIST: CPT | Performed by: INTERNAL MEDICINE

## 2018-06-06 ENCOUNTER — APPOINTMENT (OUTPATIENT)
Dept: GENERAL RADIOLOGY | Facility: HOSPITAL | Age: 78
End: 2018-06-06
Payer: MEDICARE

## 2018-06-06 ENCOUNTER — APPOINTMENT (OUTPATIENT)
Dept: NUCLEAR MEDICINE | Facility: HOSPITAL | Age: 78
End: 2018-06-06
Attending: EMERGENCY MEDICINE
Payer: MEDICARE

## 2018-06-06 ENCOUNTER — APPOINTMENT (OUTPATIENT)
Dept: CT IMAGING | Facility: HOSPITAL | Age: 78
End: 2018-06-06
Attending: EMERGENCY MEDICINE
Payer: MEDICARE

## 2018-06-06 ENCOUNTER — HOSPITAL ENCOUNTER (EMERGENCY)
Facility: HOSPITAL | Age: 78
Discharge: HOME | End: 2018-06-06
Payer: MEDICARE

## 2018-06-06 VITALS
SYSTOLIC BLOOD PRESSURE: 159 MMHG | DIASTOLIC BLOOD PRESSURE: 66 MMHG | HEIGHT: 62 IN | WEIGHT: 177 LBS | OXYGEN SATURATION: 98 % | HEART RATE: 73 BPM | TEMPERATURE: 98 F | RESPIRATION RATE: 11 BRPM | BODY MASS INDEX: 32.57 KG/M2

## 2018-06-06 DIAGNOSIS — M25.512 ACUTE PAIN OF LEFT SHOULDER: Primary | ICD-10-CM

## 2018-06-06 PROCEDURE — 99285 EMERGENCY DEPT VISIT HI MDM: CPT

## 2018-06-06 PROCEDURE — 71046 X-RAY EXAM CHEST 2 VIEWS: CPT

## 2018-06-06 PROCEDURE — 85025 COMPLETE CBC W/AUTO DIFF WBC: CPT

## 2018-06-06 PROCEDURE — 36415 COLL VENOUS BLD VENIPUNCTURE: CPT

## 2018-06-06 PROCEDURE — 85379 FIBRIN DEGRADATION QUANT: CPT | Performed by: EMERGENCY MEDICINE

## 2018-06-06 PROCEDURE — 84484 ASSAY OF TROPONIN QUANT: CPT

## 2018-06-06 PROCEDURE — 93010 ELECTROCARDIOGRAM REPORT: CPT | Performed by: INTERNAL MEDICINE

## 2018-06-06 PROCEDURE — 80048 BASIC METABOLIC PNL TOTAL CA: CPT

## 2018-06-06 PROCEDURE — 93005 ELECTROCARDIOGRAM TRACING: CPT

## 2018-06-06 PROCEDURE — 78582 LUNG VENTILAT&PERFUS IMAGING: CPT | Performed by: EMERGENCY MEDICINE

## 2018-06-06 PROCEDURE — 73030 X-RAY EXAM OF SHOULDER: CPT

## 2018-06-06 NOTE — CONSULTS
[facility] AND [facility]  [facility] Cardiology Consult Note    Kaylyn Molina Patient Status:  Emergency    10/18/1940 MRN [id number]   Location [address] Attending Cami Rangel MD   Hosp Day # 0 PCP Barrington Marie.  MD Adrienne     78 y • Pulmonary emboli ([facility] [address] May/June 2015   • Renal insufficiency 2009   • S/P colonoscopy 1/12/2015     Past Surgical History:  1/15, 11/10, 11/06: COLONOSCOPY  5/14, 4/12, 5/11, 9/08: EGD  No date: INJ TENDON/LIGAMENT/CYST      Comment: Injection Tendon S

## 2018-06-06 NOTE — ED PROVIDER NOTES
Patient Seen in: [facility] AND [facility] Emergency Department    History   Patient presents with:  Chest Pain Angina (cardiovascular)    Stated Complaint: left shoulder pain, denies jaw or chest pain     HPI    The patient is a 73-year-old female with past his HPI.  Constitutional and vital signs reviewed. All other systems reviewed and negative except as noted above.     Physical Exam   ED Triage Vitals [06/06/18 1333]  BP: 143/70  Pulse: 71  Resp: 18  Temp: 97.9 °F (36.6 °C)  Temp src: Oral  SpO2: 98 %  O2 individual orders. RAINBOW DRAW BLUE   RAINBOW DRAW LAVENDER   RAINBOW DRAW DARK GREEN   RAINBOW DRAW LIGHT GREEN   RAINBOW DRAW GOLD   RAINBOW DRAW LAVENDER TALL (BNP)   CBC W/ DIFFERENTIAL     EKG    Rate, intervals and axes as noted on EKG Report.   Ra PM

## 2018-06-11 ENCOUNTER — OFFICE VISIT (OUTPATIENT)
Dept: INTERNAL MEDICINE CLINIC | Facility: CLINIC | Age: 78
End: 2018-06-11

## 2018-06-11 VITALS
BODY MASS INDEX: 33.49 KG/M2 | DIASTOLIC BLOOD PRESSURE: 78 MMHG | SYSTOLIC BLOOD PRESSURE: 140 MMHG | HEIGHT: 62 IN | HEART RATE: 69 BPM | TEMPERATURE: 99 F | WEIGHT: 182 LBS | OXYGEN SATURATION: 98 %

## 2018-06-11 DIAGNOSIS — M25.512 ACUTE PAIN OF LEFT SHOULDER: Primary | ICD-10-CM

## 2018-06-11 DIAGNOSIS — I10 HYPERTENSION, BENIGN: ICD-10-CM

## 2018-06-11 PROCEDURE — 99214 OFFICE O/P EST MOD 30 MIN: CPT | Performed by: INTERNAL MEDICINE

## 2018-06-11 PROCEDURE — G0463 HOSPITAL OUTPT CLINIC VISIT: HCPCS | Performed by: INTERNAL MEDICINE

## 2018-06-11 RX ORDER — NYSTATIN 100000 U/G
OINTMENT TOPICAL AS NEEDED
Refills: 0 | COMMUNITY
Start: 2018-05-25 | End: 2019-04-23

## 2018-06-11 NOTE — PROGRESS NOTES
Gagandeep Rodriguez is a 78year old female. HPI:   1.  Acute pain of left shoulder    She was returning throw carpets to a store last Sun and then on Mon and Tues mild pain and then severe pain last Wed and she went ot ED and xrays neg and VQ scan neg for PE. Renal insufficiency 2009   • S/P colonoscopy 1/12/2015      Social History:  Smoking status: Never Smoker                                                              Smokeless tobacco: Never Used                      Alcohol use:  No                 REVIEW

## 2018-06-26 ENCOUNTER — TELEPHONE (OUTPATIENT)
Dept: INTERNAL MEDICINE CLINIC | Facility: CLINIC | Age: 78
End: 2018-06-26

## 2018-06-26 DIAGNOSIS — K22.719 BARRETT'S ESOPHAGUS WITH DYSPLASIA: Primary | ICD-10-CM

## 2018-06-26 NOTE — TELEPHONE ENCOUNTER
Pt is calling to see if Dr LINARES can give her a refill of her prescription of famotidine 40 MG Oral Tab. Pt gets this refilled from Dr Aaron Rodriguez usually, but Dr Aaron Rodriguez has retired.  She saw Dr Aaron Rodriguez about a month ago, and when she called to get a refill, the office

## 2018-06-26 NOTE — TELEPHONE ENCOUNTER
Per Epic records, pt was previously taking famotidine 40mg BID. To Dr. Arun Stephens, okay to order BID?

## 2018-06-27 RX ORDER — FAMOTIDINE 40 MG/1
40 TABLET, FILM COATED ORAL 2 TIMES DAILY
Qty: 180 TABLET | Refills: 3 | Status: SHIPPED | OUTPATIENT
Start: 2018-06-27 | End: 2019-05-09

## 2018-06-27 NOTE — TELEPHONE ENCOUNTER
eRx sent. Famotidine 40mg BID #180 with 3RF. Pt notified    Pt is asking for recommendation for a GI doctor that she can see now that Dr. Gladys Meyers has retired    To Dr. Mikala Perez, please advise.  Referral pending

## 2018-10-01 ENCOUNTER — OFFICE VISIT (OUTPATIENT)
Dept: INTERNAL MEDICINE CLINIC | Facility: CLINIC | Age: 78
End: 2018-10-01
Payer: MEDICARE

## 2018-10-01 ENCOUNTER — APPOINTMENT (OUTPATIENT)
Dept: GENERAL RADIOLOGY | Facility: HOSPITAL | Age: 78
End: 2018-10-01
Attending: EMERGENCY MEDICINE
Payer: MEDICARE

## 2018-10-01 ENCOUNTER — HOSPITAL ENCOUNTER (EMERGENCY)
Facility: HOSPITAL | Age: 78
Discharge: HOME | End: 2018-10-01
Attending: EMERGENCY MEDICINE | Admitting: INTERNAL MEDICINE
Payer: MEDICARE

## 2018-10-01 VITALS
RESPIRATION RATE: 16 BRPM | HEART RATE: 61 BPM | TEMPERATURE: 99 F | OXYGEN SATURATION: 97 % | DIASTOLIC BLOOD PRESSURE: 76 MMHG | HEIGHT: 62 IN | SYSTOLIC BLOOD PRESSURE: 144 MMHG | WEIGHT: 175 LBS | BODY MASS INDEX: 32.2 KG/M2

## 2018-10-01 VITALS
BODY MASS INDEX: 33.31 KG/M2 | OXYGEN SATURATION: 98 % | DIASTOLIC BLOOD PRESSURE: 78 MMHG | WEIGHT: 181 LBS | HEIGHT: 62 IN | HEART RATE: 77 BPM | TEMPERATURE: 99 F | SYSTOLIC BLOOD PRESSURE: 142 MMHG

## 2018-10-01 DIAGNOSIS — R07.9 LEFT SIDED CHEST PAIN: Primary | ICD-10-CM

## 2018-10-01 DIAGNOSIS — N18.30 STAGE 3 CHRONIC KIDNEY DISEASE (HCC): ICD-10-CM

## 2018-10-01 DIAGNOSIS — Z86.711 HISTORY OF PULMONARY EMBOLISM: ICD-10-CM

## 2018-10-01 DIAGNOSIS — R07.89 CHEST PAIN, ATYPICAL: Primary | ICD-10-CM

## 2018-10-01 DIAGNOSIS — S29.011A MUSCLE STRAIN OF CHEST WALL, INITIAL ENCOUNTER: ICD-10-CM

## 2018-10-01 PROCEDURE — 84484 ASSAY OF TROPONIN QUANT: CPT | Performed by: EMERGENCY MEDICINE

## 2018-10-01 PROCEDURE — 93005 ELECTROCARDIOGRAM TRACING: CPT

## 2018-10-01 PROCEDURE — 36415 COLL VENOUS BLD VENIPUNCTURE: CPT

## 2018-10-01 PROCEDURE — 93010 ELECTROCARDIOGRAM REPORT: CPT | Performed by: INTERNAL MEDICINE

## 2018-10-01 PROCEDURE — 85379 FIBRIN DEGRADATION QUANT: CPT | Performed by: EMERGENCY MEDICINE

## 2018-10-01 PROCEDURE — 99214 OFFICE O/P EST MOD 30 MIN: CPT | Performed by: INTERNAL MEDICINE

## 2018-10-01 PROCEDURE — 99285 EMERGENCY DEPT VISIT HI MDM: CPT

## 2018-10-01 PROCEDURE — 71046 X-RAY EXAM CHEST 2 VIEWS: CPT | Performed by: EMERGENCY MEDICINE

## 2018-10-01 PROCEDURE — 93005 ELECTROCARDIOGRAM TRACING: CPT | Performed by: INTERNAL MEDICINE

## 2018-10-01 PROCEDURE — 93010 ELECTROCARDIOGRAM REPORT: CPT | Performed by: EMERGENCY MEDICINE

## 2018-10-01 PROCEDURE — 85025 COMPLETE CBC W/AUTO DIFF WBC: CPT | Performed by: EMERGENCY MEDICINE

## 2018-10-01 PROCEDURE — 80048 BASIC METABOLIC PNL TOTAL CA: CPT | Performed by: EMERGENCY MEDICINE

## 2018-10-01 NOTE — ED INITIAL ASSESSMENT (HPI)
Patient here with c/o left chest pain for the last few days. States she has hx of PE 3 years ago, not currently on any anticoagulants. States she has also been working in the garden a lot, but denies any injury.

## 2018-10-01 NOTE — ED PROVIDER NOTES
Patient Seen in: [facility] AND [facility] Emergency Department    History   Patient presents with:  Chest Pain Angina (cardiovascular)    Stated Complaint: Chest pain     HPI    Patient presents to the emergency department with chest pain.   She has had this stefanie Calcium Carbonate-Vitamin D (CALCIUM 600 + D OR),  Take 1 tablet by mouth 2 (two) times daily.          Family History   Problem Relation Age of Onset   • Cancer Mother 47        lymphoma   • Cancer Father         lung cancer   • Hypertension Father    • Gl hepato-splenomegaly. Negative McBurney's point tenderness. Musculoskeletal:  Good muscle tone. No tenderness to the chest  Skin:  Warm, dry, well perfused. Good skin turgor. No rashes seen.   Neurology:  Moving all extremities equally with good coordin We recommend that you schedule follow up care with a primary care provider within the next three months to obtain basic health screening including reassessment of your blood pressure.       Clinical Impression:  Chest pain, atypical  (primary encounter

## 2018-10-12 RX ORDER — POTASSIUM CHLORIDE 750 MG/1
TABLET, EXTENDED RELEASE ORAL
Qty: 45 TABLET | Refills: 3 | Status: SHIPPED | OUTPATIENT
Start: 2018-10-12 | End: 2019-03-29

## 2018-11-06 ENCOUNTER — OFFICE VISIT (OUTPATIENT)
Dept: INTERNAL MEDICINE CLINIC | Facility: CLINIC | Age: 78
End: 2018-11-06
Payer: MEDICARE

## 2018-11-06 VITALS
HEIGHT: 62 IN | SYSTOLIC BLOOD PRESSURE: 136 MMHG | WEIGHT: 176 LBS | HEART RATE: 67 BPM | DIASTOLIC BLOOD PRESSURE: 88 MMHG | TEMPERATURE: 98 F | BODY MASS INDEX: 32.39 KG/M2 | OXYGEN SATURATION: 98 %

## 2018-11-06 DIAGNOSIS — K22.719 BARRETT'S ESOPHAGUS WITH DYSPLASIA: ICD-10-CM

## 2018-11-06 DIAGNOSIS — E78.00 PURE HYPERCHOLESTEROLEMIA: Primary | ICD-10-CM

## 2018-11-06 DIAGNOSIS — I10 HYPERTENSION, BENIGN: ICD-10-CM

## 2018-11-06 PROCEDURE — G0463 HOSPITAL OUTPT CLINIC VISIT: HCPCS | Performed by: INTERNAL MEDICINE

## 2018-11-06 PROCEDURE — 99214 OFFICE O/P EST MOD 30 MIN: CPT | Performed by: INTERNAL MEDICINE

## 2018-11-06 PROCEDURE — G0009 ADMIN PNEUMOCOCCAL VACCINE: HCPCS | Performed by: INTERNAL MEDICINE

## 2018-11-06 PROCEDURE — 90732 PPSV23 VACC 2 YRS+ SUBQ/IM: CPT | Performed by: INTERNAL MEDICINE

## 2018-11-06 NOTE — PROGRESS NOTES
Perlita Gonzáles is a 70year old female. HPI:   She has been feeling well. She exercises daily. Went to ED on 10/1 with chest pain and work up for PE and cardiac source were neg. She is doing well otherwise - good energy good appetite.        SR: no Never Used    Alcohol use: No    Drug use: No       REVIEW OF SYSTEMS:   GENERAL HEALTH: feels well otherwise  SKIN: denies any unusual skin lesions or rashes  RESPIRATORY: denies shortness of breath with exertion  CARDIOVASCULAR: denies chest pain on exer

## 2018-11-09 ENCOUNTER — OFFICE VISIT (OUTPATIENT)
Dept: INTERNAL MEDICINE CLINIC | Facility: CLINIC | Age: 78
End: 2018-11-09
Payer: MEDICARE

## 2018-11-09 VITALS
OXYGEN SATURATION: 99 % | HEIGHT: 62 IN | SYSTOLIC BLOOD PRESSURE: 142 MMHG | DIASTOLIC BLOOD PRESSURE: 80 MMHG | BODY MASS INDEX: 32.57 KG/M2 | TEMPERATURE: 98 F | WEIGHT: 177 LBS | HEART RATE: 66 BPM

## 2018-11-09 DIAGNOSIS — M25.512 ACUTE PAIN OF LEFT SHOULDER: Primary | ICD-10-CM

## 2018-11-09 PROCEDURE — 99213 OFFICE O/P EST LOW 20 MIN: CPT | Performed by: INTERNAL MEDICINE

## 2018-11-09 PROCEDURE — G0463 HOSPITAL OUTPT CLINIC VISIT: HCPCS | Performed by: INTERNAL MEDICINE

## 2018-11-09 RX ORDER — NIFEDIPINE 60 MG/1
TABLET, EXTENDED RELEASE ORAL
Qty: 90 TABLET | Refills: 1 | Status: SHIPPED | OUTPATIENT
Start: 2018-11-09 | End: 2019-05-09

## 2018-11-09 RX ORDER — ATORVASTATIN CALCIUM 10 MG/1
TABLET, FILM COATED ORAL
Qty: 90 TABLET | Refills: 1 | Status: SHIPPED | OUTPATIENT
Start: 2018-11-09 | End: 2019-05-09

## 2018-11-09 RX ORDER — METOPROLOL SUCCINATE 100 MG/1
TABLET, EXTENDED RELEASE ORAL
Qty: 90 TABLET | Refills: 1 | Status: SHIPPED | OUTPATIENT
Start: 2018-11-09 | End: 2019-05-09

## 2018-11-09 NOTE — PROGRESS NOTES
Freeman Canseco is a 70year old female. HPI:   She was doing some yard work last Sat and then had some pain in the left deltoid, thein had pneumonia vaccine on Tues and now pain worse in left deltoid. Took Tylenol today and she feels better.   No swelling Smoking status: Never Smoker      Smokeless tobacco: Never Used    Alcohol use: No    Drug use: No       REVIEW OF SYSTEMS:   GENERAL HEALTH: feels well otherwise  SKIN: denies any unusual skin lesions or rashes  RESPIRATORY: denies shortness of breath

## 2018-12-24 ENCOUNTER — TELEPHONE (OUTPATIENT)
Dept: INTERNAL MEDICINE CLINIC | Facility: CLINIC | Age: 78
End: 2018-12-24

## 2018-12-24 RX ORDER — AZITHROMYCIN 250 MG/1
TABLET, FILM COATED ORAL
Qty: 6 TABLET | Refills: 0 | Status: SHIPPED | OUTPATIENT
Start: 2018-12-24 | End: 2019-01-04

## 2018-12-24 NOTE — TELEPHONE ENCOUNTER
Spoke with patient. Last Thursday started as dry cough. Then sore throat but cough went away. Cough came back and last night she felt like she was developing a cold, had a stuffy nose. But today nose is dried up.  The only symptoms she continues to complai

## 2018-12-24 NOTE — TELEPHONE ENCOUNTER
Pt started with dry cough now its a sore throat then a cold now a cough again her spouse had the same thing asking if she can have in rx called in. Also pt have chronic kidney disease.

## 2018-12-24 NOTE — TELEPHONE ENCOUNTER
Bedside and Verbal shift change report given to Edita Ponce RN (oncoming nurse) by Isrrael Snow RN (offgoing nurse). Report included the following information SBAR, Kardex, ED Summary, OR Summary, Intake/Output, MAR, Recent Results and Cardiac Rhythm SR. Patient in bed watching tv. PCA pump verified via MAR. Dual skin assessment completed. Imp- URI; Rec - Zithromax (Efraín) as directed x5d; pt called; ERx sent

## 2018-12-28 ENCOUNTER — TELEPHONE (OUTPATIENT)
Dept: INTERNAL MEDICINE CLINIC | Facility: CLINIC | Age: 78
End: 2018-12-28

## 2018-12-28 ENCOUNTER — HOSPITAL ENCOUNTER (OUTPATIENT)
Dept: GENERAL RADIOLOGY | Facility: HOSPITAL | Age: 78
Discharge: HOME | End: 2018-12-28
Attending: INTERNAL MEDICINE | Admitting: INTERNAL MEDICINE
Payer: MEDICARE

## 2018-12-28 ENCOUNTER — OFFICE VISIT (OUTPATIENT)
Dept: INTERNAL MEDICINE CLINIC | Facility: CLINIC | Age: 78
End: 2018-12-28
Payer: MEDICARE

## 2018-12-28 VITALS
HEART RATE: 76 BPM | WEIGHT: 168 LBS | OXYGEN SATURATION: 90 % | DIASTOLIC BLOOD PRESSURE: 68 MMHG | TEMPERATURE: 99 F | SYSTOLIC BLOOD PRESSURE: 148 MMHG | BODY MASS INDEX: 31 KG/M2

## 2018-12-28 DIAGNOSIS — N18.9 CHRONIC KIDNEY DISEASE, UNSPECIFIED CKD STAGE: ICD-10-CM

## 2018-12-28 DIAGNOSIS — J06.9 ACUTE UPPER RESPIRATORY INFECTION, UNSPECIFIED: ICD-10-CM

## 2018-12-28 DIAGNOSIS — J06.9 ACUTE UPPER RESPIRATORY INFECTION, UNSPECIFIED: Primary | ICD-10-CM

## 2018-12-28 DIAGNOSIS — I10 HYPERTENSION, BENIGN: ICD-10-CM

## 2018-12-28 PROCEDURE — G0463 HOSPITAL OUTPT CLINIC VISIT: HCPCS | Performed by: INTERNAL MEDICINE

## 2018-12-28 PROCEDURE — 99214 OFFICE O/P EST MOD 30 MIN: CPT | Performed by: INTERNAL MEDICINE

## 2018-12-28 PROCEDURE — 71046 X-RAY EXAM CHEST 2 VIEWS: CPT | Performed by: INTERNAL MEDICINE

## 2018-12-28 RX ORDER — DOXYCYCLINE HYCLATE 100 MG/1
100 CAPSULE ORAL 2 TIMES DAILY
Qty: 14 CAPSULE | Refills: 0 | Status: SHIPPED | OUTPATIENT
Start: 2018-12-28 | End: 2019-03-21 | Stop reason: ALTCHOICE

## 2018-12-28 NOTE — PROGRESS NOTES
Lilia Acosta is a 70year old female. HPI:   Patient presents with:  Cough: x 1 week  Chest Congestion       Patient was given a Zithromax Z-Deep for URI symptoms Aleja Astrid. This caused some loose bowels. She took 4 days. She did not take today's. Calcium Carbonate-Vitamin D (CALCIUM 600 + D OR) Take 1 tablet by mouth 2 (two) times daily.    Disp:  Rfl:       Past Medical History:   Diagnosis Date   • Dougherty's esophagus 2008   • Chronic neck pain    • DVT (deep venous thrombosis) (HCC)    • Hematu chest x-ray. Patient verbalized understanding.  - XR CHEST PA + LAT CHEST (CPT=71046); Future    2. Chronic kidney disease, unspecified CKD stage  Stable. She knows to avoid nonsteroidal    3.  Hypertension, benign  Blood pressure under satisfactory contr

## 2018-12-28 NOTE — TELEPHONE ENCOUNTER
Please notify patient that her chest x-ray looked pretty good. There was some abnormality called atelectasis at this just sometimes means we are not taking a deep breath. No absolute evidence of pneumonia.   I am hoping she is feeling better with her anti

## 2019-01-02 ENCOUNTER — TELEPHONE (OUTPATIENT)
Dept: INTERNAL MEDICINE CLINIC | Facility: CLINIC | Age: 79
End: 2019-01-02

## 2019-01-02 NOTE — TELEPHONE ENCOUNTER
Patient saw Dr. Grupo King on Friday & got doxycycline which patient will finish tomorrow. Patient feels much better, but still has a tan phelgm with her coughing.   No temp    Should she get more antibiotics or a cough medication - send to [facility] in [place]

## 2019-01-02 NOTE — TELEPHONE ENCOUNTER
Would recommend waiting as the cough can linger. If she feels she is getting worse, she should let us know.

## 2019-01-04 ENCOUNTER — OFFICE VISIT (OUTPATIENT)
Dept: INTERNAL MEDICINE CLINIC | Facility: CLINIC | Age: 79
End: 2019-01-04
Payer: MEDICARE

## 2019-01-04 VITALS
SYSTOLIC BLOOD PRESSURE: 144 MMHG | TEMPERATURE: 98 F | BODY MASS INDEX: 32.59 KG/M2 | HEIGHT: 60.6 IN | WEIGHT: 170.38 LBS | OXYGEN SATURATION: 96 % | DIASTOLIC BLOOD PRESSURE: 72 MMHG | HEART RATE: 68 BPM

## 2019-01-04 DIAGNOSIS — S16.1XXA STRAIN OF NECK MUSCLE, INITIAL ENCOUNTER: Primary | ICD-10-CM

## 2019-01-04 DIAGNOSIS — J40 BRONCHITIS: ICD-10-CM

## 2019-01-04 PROCEDURE — 99213 OFFICE O/P EST LOW 20 MIN: CPT | Performed by: INTERNAL MEDICINE

## 2019-01-04 RX ORDER — METHOCARBAMOL 500 MG/1
500 TABLET, FILM COATED ORAL 3 TIMES DAILY
Qty: 21 TABLET | Refills: 1 | Status: SHIPPED | OUTPATIENT
Start: 2019-01-04 | End: 2019-03-21 | Stop reason: ALTCHOICE

## 2019-01-04 NOTE — PROGRESS NOTES
Johnie Marquis is a 70year old female. HPI:   She came inside from the cold last Wed - two days ago - and immediately had a painful, stiff neck with some improvement today but severe pain and restricted rom.      Bronchitis improving - light tan colon exp insufficiency 2009   • S/P colonoscopy 1/12/2015      Social History:  Social History    Tobacco Use      Smoking status: Never Smoker      Smokeless tobacco: Never Used    Alcohol use: No    Drug use: No       REVIEW OF SYSTEMS:   GENERAL HEALTH: feels we

## 2019-01-16 ENCOUNTER — APPOINTMENT (OUTPATIENT)
Dept: LAB | Facility: HOSPITAL | Age: 79
End: 2019-01-16
Attending: INTERNAL MEDICINE
Payer: MEDICARE

## 2019-01-16 DIAGNOSIS — N17.9 ACUTE RENAL FAILURE, UNSPECIFIED ACUTE RENAL FAILURE TYPE (HCC): ICD-10-CM

## 2019-01-16 LAB
ALBUMIN SERPL BCP-MCNC: 3.5 G/DL (ref 3.5–4.8)
ANION GAP SERPL CALC-SCNC: 10 MMOL/L (ref 0–18)
BILIRUB UR QL: NEGATIVE
BUN SERPL-MCNC: 13 MG/DL (ref 8–20)
BUN/CREAT SERPL: 12 (ref 10–20)
CALCIUM SERPL-MCNC: 9.2 MG/DL (ref 8.5–10.5)
CHLORIDE SERPL-SCNC: 105 MMOL/L (ref 95–110)
CO2 SERPL-SCNC: 26 MMOL/L (ref 22–32)
COLOR UR: YELLOW
CREAT SERPL-MCNC: 1.08 MG/DL (ref 0.5–1.5)
GLUCOSE SERPL-MCNC: 101 MG/DL (ref 70–99)
GLUCOSE UR-MCNC: NEGATIVE MG/DL
HCT VFR BLD AUTO: 39.3 % (ref 35–48)
HGB BLD-MCNC: 13.5 G/DL (ref 12–16)
HGB UR QL STRIP.AUTO: NEGATIVE
KETONES UR-MCNC: NEGATIVE MG/DL
NITRITE UR QL STRIP.AUTO: NEGATIVE
OSMOLALITY UR CALC.SUM OF ELEC: 292 MOSM/KG (ref 275–295)
PH UR: 6 [PH] (ref 5–8)
PHOSPHATE SERPL-MCNC: 3.2 MG/DL (ref 2.4–4.7)
POTASSIUM SERPL-SCNC: 3.3 MMOL/L (ref 3.3–5.1)
PROT UR-MCNC: NEGATIVE MG/DL
RBC #/AREA URNS AUTO: 3 /HPF
SODIUM SERPL-SCNC: 141 MMOL/L (ref 136–144)
SP GR UR STRIP: 1.01 (ref 1–1.03)
UROBILINOGEN UR STRIP-ACNC: <2
VIT C UR-MCNC: NEGATIVE MG/DL
WBC #/AREA URNS AUTO: 111 /HPF

## 2019-01-16 PROCEDURE — 36415 COLL VENOUS BLD VENIPUNCTURE: CPT

## 2019-01-16 PROCEDURE — 81001 URINALYSIS AUTO W/SCOPE: CPT

## 2019-01-16 PROCEDURE — 80069 RENAL FUNCTION PANEL: CPT

## 2019-01-16 PROCEDURE — 85018 HEMOGLOBIN: CPT

## 2019-01-16 PROCEDURE — 85014 HEMATOCRIT: CPT

## 2019-02-22 ENCOUNTER — TELEPHONE (OUTPATIENT)
Dept: INTERNAL MEDICINE CLINIC | Facility: CLINIC | Age: 79
End: 2019-02-22

## 2019-02-22 NOTE — TELEPHONE ENCOUNTER
Pt. Is calling to ask Dr. Mohsen Calvin can recommend her someone since Dr. Mely Elizabeth retired ph. # [phone] pt.  Is ok Dr. Mohsen Calvin is off ok to wait routed to clinical

## 2019-03-21 ENCOUNTER — OFFICE VISIT (OUTPATIENT)
Dept: INTERNAL MEDICINE CLINIC | Facility: CLINIC | Age: 79
End: 2019-03-21
Payer: MEDICARE

## 2019-03-21 VITALS
WEIGHT: 170 LBS | BODY MASS INDEX: 32.51 KG/M2 | DIASTOLIC BLOOD PRESSURE: 90 MMHG | SYSTOLIC BLOOD PRESSURE: 152 MMHG | OXYGEN SATURATION: 95 % | TEMPERATURE: 98 F | HEART RATE: 76 BPM | HEIGHT: 60.5 IN

## 2019-03-21 DIAGNOSIS — E78.00 PURE HYPERCHOLESTEROLEMIA: ICD-10-CM

## 2019-03-21 DIAGNOSIS — I10 HYPERTENSION, BENIGN: ICD-10-CM

## 2019-03-21 DIAGNOSIS — J06.9 URI, ACUTE: Primary | ICD-10-CM

## 2019-03-21 PROCEDURE — G0463 HOSPITAL OUTPT CLINIC VISIT: HCPCS | Performed by: INTERNAL MEDICINE

## 2019-03-21 PROCEDURE — 99214 OFFICE O/P EST MOD 30 MIN: CPT | Performed by: INTERNAL MEDICINE

## 2019-03-21 RX ORDER — DOXYCYCLINE HYCLATE 100 MG/1
100 CAPSULE ORAL 2 TIMES DAILY
Qty: 20 CAPSULE | Refills: 0 | Status: SHIPPED | OUTPATIENT
Start: 2019-03-21 | End: 2019-04-23

## 2019-03-21 NOTE — PROGRESS NOTES
Freeman Canseco is a 70year old female. HPI:   Patient presents with:  Neck Pain: Feels tender on the right side of her neck, had an US with Dr. Anastasiia Arriaga in the past. Friend told her she sounded hoarse. She is clearing her throat more. Slight cough.  She ha mouth 2 (two) times daily.  Disp: 20 capsule Rfl: 0   NIFEDIPINE ER OSMOTIC RELEASE 60 MG Oral Tablet 24 Hr TAKE 1 TABLET BY MOUTH EVERY DAY Disp: 90 tablet Rfl: 1   METOPROLOL SUCCINATE  MG Oral Tablet 24 Hr TAKE 1 TABLET BY MOUTH EVERY DAY Disp: 90 lungs without crackles or wheezes  Abdomen: normoactive bowel sounds, soft, non-tender and non-distended  Extremities: no clubbing, cyanosis or edema           ASSESSMENT/PLAN:   URI  Will give doxycycline 100 mg po BID x 10d     HTN  On metoprolol

## 2019-03-22 ENCOUNTER — LAB ENCOUNTER (OUTPATIENT)
Dept: LAB | Facility: HOSPITAL | Age: 79
End: 2019-03-22
Attending: INTERNAL MEDICINE
Payer: MEDICARE

## 2019-03-22 DIAGNOSIS — E78.00 PURE HYPERCHOLESTEROLEMIA: ICD-10-CM

## 2019-03-22 DIAGNOSIS — I10 HYPERTENSION, BENIGN: ICD-10-CM

## 2019-03-22 LAB
ALBUMIN SERPL-MCNC: 3.7 G/DL (ref 3.4–5)
ALBUMIN/GLOB SERPL: 1 {RATIO} (ref 1–2)
ALP LIVER SERPL-CCNC: 101 U/L (ref 55–142)
ALT SERPL-CCNC: 25 U/L (ref 13–56)
ANION GAP SERPL CALC-SCNC: 8 MMOL/L (ref 0–18)
AST SERPL-CCNC: 18 U/L (ref 15–37)
BASOPHILS # BLD AUTO: 0.03 X10(3) UL (ref 0–0.2)
BASOPHILS NFR BLD AUTO: 0.5 %
BILIRUB SERPL-MCNC: 0.9 MG/DL (ref 0.1–2)
BUN BLD-MCNC: 14 MG/DL (ref 7–18)
BUN/CREAT SERPL: 13.2 (ref 10–20)
CALCIUM BLD-MCNC: 9.2 MG/DL (ref 8.5–10.1)
CHLORIDE SERPL-SCNC: 106 MMOL/L (ref 98–107)
CHOLEST SMN-MCNC: 168 MG/DL (ref ?–200)
CO2 SERPL-SCNC: 29 MMOL/L (ref 21–32)
CREAT BLD-MCNC: 1.06 MG/DL (ref 0.55–1.02)
DEPRECATED RDW RBC AUTO: 44.9 FL (ref 35.1–46.3)
EOSINOPHIL # BLD AUTO: 0.26 X10(3) UL (ref 0–0.7)
EOSINOPHIL NFR BLD AUTO: 4.3 %
ERYTHROCYTE [DISTWIDTH] IN BLOOD BY AUTOMATED COUNT: 12.9 % (ref 11–15)
GLOBULIN PLAS-MCNC: 3.6 G/DL (ref 2.8–4.4)
GLUCOSE BLD-MCNC: 101 MG/DL (ref 70–99)
HCT VFR BLD AUTO: 41.7 % (ref 35–48)
HDLC SERPL-MCNC: 70 MG/DL (ref 40–59)
HGB BLD-MCNC: 14 G/DL (ref 12–16)
IMM GRANULOCYTES # BLD AUTO: 0.01 X10(3) UL (ref 0–1)
IMM GRANULOCYTES NFR BLD: 0.2 %
LDLC SERPL CALC-MCNC: 82 MG/DL (ref ?–100)
LYMPHOCYTES # BLD AUTO: 1.62 X10(3) UL (ref 1–4)
LYMPHOCYTES NFR BLD AUTO: 26.9 %
M PROTEIN MFR SERPL ELPH: 7.3 G/DL (ref 6.4–8.2)
MCH RBC QN AUTO: 31.8 PG (ref 26–34)
MCHC RBC AUTO-ENTMCNC: 33.6 G/DL (ref 31–37)
MCV RBC AUTO: 94.8 FL (ref 80–100)
MONOCYTES # BLD AUTO: 0.72 X10(3) UL (ref 0.1–1)
MONOCYTES NFR BLD AUTO: 11.9 %
NEUTROPHILS # BLD AUTO: 3.39 X10 (3) UL (ref 1.5–7.7)
NEUTROPHILS # BLD AUTO: 3.39 X10(3) UL (ref 1.5–7.7)
NEUTROPHILS NFR BLD AUTO: 56.2 %
NONHDLC SERPL-MCNC: 98 MG/DL (ref ?–130)
OSMOLALITY SERPL CALC.SUM OF ELEC: 297 MOSM/KG (ref 275–295)
PLATELET # BLD AUTO: 311 10(3)UL (ref 150–450)
POTASSIUM SERPL-SCNC: 3.3 MMOL/L (ref 3.5–5.1)
RBC # BLD AUTO: 4.4 X10(6)UL (ref 3.8–5.3)
SODIUM SERPL-SCNC: 143 MMOL/L (ref 136–145)
TRIGL SERPL-MCNC: 82 MG/DL (ref 30–149)
TSI SER-ACNC: 3.84 MIU/ML (ref 0.36–3.74)
VLDLC SERPL CALC-MCNC: 16 MG/DL (ref 0–30)
WBC # BLD AUTO: 6 X10(3) UL (ref 4–11)

## 2019-03-22 PROCEDURE — 85025 COMPLETE CBC W/AUTO DIFF WBC: CPT

## 2019-03-22 PROCEDURE — 80053 COMPREHEN METABOLIC PANEL: CPT

## 2019-03-22 PROCEDURE — 80061 LIPID PANEL: CPT

## 2019-03-22 PROCEDURE — 84443 ASSAY THYROID STIM HORMONE: CPT

## 2019-03-22 PROCEDURE — 36415 COLL VENOUS BLD VENIPUNCTURE: CPT

## 2019-03-29 ENCOUNTER — OFFICE VISIT (OUTPATIENT)
Dept: INTERNAL MEDICINE CLINIC | Facility: CLINIC | Age: 79
End: 2019-03-29
Payer: MEDICARE

## 2019-03-29 VITALS
HEART RATE: 82 BPM | BODY MASS INDEX: 32.39 KG/M2 | DIASTOLIC BLOOD PRESSURE: 80 MMHG | RESPIRATION RATE: 12 BRPM | TEMPERATURE: 98 F | HEIGHT: 60 IN | WEIGHT: 165 LBS | SYSTOLIC BLOOD PRESSURE: 138 MMHG | OXYGEN SATURATION: 98 %

## 2019-03-29 DIAGNOSIS — Z12.39 BREAST CANCER SCREENING: ICD-10-CM

## 2019-03-29 DIAGNOSIS — Z78.0 POSTMENOPAUSAL: ICD-10-CM

## 2019-03-29 DIAGNOSIS — E78.00 HYPERCHOLESTEROLEMIA: ICD-10-CM

## 2019-03-29 DIAGNOSIS — K22.719 BARRETT'S ESOPHAGUS WITH DYSPLASIA: ICD-10-CM

## 2019-03-29 DIAGNOSIS — I10 HYPERTENSION, BENIGN: Primary | ICD-10-CM

## 2019-03-29 PROCEDURE — 99214 OFFICE O/P EST MOD 30 MIN: CPT | Performed by: INTERNAL MEDICINE

## 2019-03-29 PROCEDURE — G0463 HOSPITAL OUTPT CLINIC VISIT: HCPCS | Performed by: INTERNAL MEDICINE

## 2019-03-29 RX ORDER — POTASSIUM CHLORIDE 750 MG/1
10 TABLET, EXTENDED RELEASE ORAL DAILY
Qty: 90 TABLET | Refills: 3 | Status: SHIPPED | OUTPATIENT
Start: 2019-03-29 | End: 2020-01-08

## 2019-03-29 NOTE — PROGRESS NOTES
Pushpa Bustamante is a 70year old female. HPI:   She is doing well. No ED or UC visits. She is recovering from a mild URI, post nasal drip. Barretts esophagus - had upper endo last yr. She is dong well in this regard.      HT[facility] - [facility]   • Renal insufficiency 2009   • S/P colonoscopy 1/12/2015      Social History:  Social History    Tobacco Use      Smoking status: Never Smoker      Smokeless tobacco: Never Used    Alcohol use: No    Drug use: No       REVIEW OF SYSTE

## 2019-04-23 ENCOUNTER — OFFICE VISIT (OUTPATIENT)
Dept: INTERNAL MEDICINE CLINIC | Facility: CLINIC | Age: 79
End: 2019-04-23
Payer: MEDICARE

## 2019-04-23 VITALS
TEMPERATURE: 99 F | OXYGEN SATURATION: 97 % | HEART RATE: 67 BPM | WEIGHT: 172 LBS | HEIGHT: 60 IN | BODY MASS INDEX: 33.77 KG/M2 | DIASTOLIC BLOOD PRESSURE: 78 MMHG | SYSTOLIC BLOOD PRESSURE: 134 MMHG

## 2019-04-23 DIAGNOSIS — M25.562 ACUTE PAIN OF LEFT KNEE: Primary | ICD-10-CM

## 2019-04-23 DIAGNOSIS — I10 HYPERTENSION, BENIGN: ICD-10-CM

## 2019-04-23 PROCEDURE — G0463 HOSPITAL OUTPT CLINIC VISIT: HCPCS | Performed by: INTERNAL MEDICINE

## 2019-04-23 PROCEDURE — 99213 OFFICE O/P EST LOW 20 MIN: CPT | Performed by: INTERNAL MEDICINE

## 2019-04-23 PROCEDURE — 96372 THER/PROPH/DIAG INJ SC/IM: CPT | Performed by: INTERNAL MEDICINE

## 2019-04-23 RX ORDER — TRIAMCINOLONE ACETONIDE 40 MG/ML
40 INJECTION, SUSPENSION INTRA-ARTICULAR; INTRAMUSCULAR ONCE
Status: COMPLETED | OUTPATIENT
Start: 2019-04-23 | End: 2019-04-23

## 2019-05-09 ENCOUNTER — TELEPHONE (OUTPATIENT)
Dept: INTERNAL MEDICINE CLINIC | Facility: CLINIC | Age: 79
End: 2019-05-09

## 2019-05-09 RX ORDER — NIFEDIPINE 60 MG/1
TABLET, EXTENDED RELEASE ORAL
Qty: 90 TABLET | Refills: 3 | Status: SHIPPED | OUTPATIENT
Start: 2019-05-09 | End: 2020-05-04

## 2019-05-09 RX ORDER — METOPROLOL SUCCINATE 100 MG/1
TABLET, EXTENDED RELEASE ORAL
Qty: 90 TABLET | Refills: 3 | Status: SHIPPED | OUTPATIENT
Start: 2019-05-09 | End: 2020-05-04

## 2019-05-09 RX ORDER — FAMOTIDINE 40 MG/1
TABLET, FILM COATED ORAL
Qty: 180 TABLET | Refills: 3 | Status: SHIPPED | OUTPATIENT
Start: 2019-05-09 | End: 2020-05-04

## 2019-05-09 RX ORDER — ATORVASTATIN CALCIUM 10 MG/1
TABLET, FILM COATED ORAL
Qty: 90 TABLET | Refills: 3 | Status: SHIPPED | OUTPATIENT
Start: 2019-05-09 | End: 2020-05-04

## 2019-05-17 RX ORDER — NYSTATIN 100000 U/G
OINTMENT TOPICAL
Qty: 30 G | Refills: 0 | Status: SHIPPED | OUTPATIENT
Start: 2019-05-17 | End: 2019-12-10

## 2019-06-11 ENCOUNTER — TELEPHONE (OUTPATIENT)
Dept: INTERNAL MEDICINE CLINIC | Facility: CLINIC | Age: 79
End: 2019-06-11

## 2019-06-11 DIAGNOSIS — Z12.31 ENCOUNTER FOR SCREENING MAMMOGRAM FOR MALIGNANT NEOPLASM OF BREAST: ICD-10-CM

## 2019-06-11 DIAGNOSIS — Z12.39 SCREENING FOR BREAST CANCER: Primary | ICD-10-CM

## 2019-06-11 NOTE — TELEPHONE ENCOUNTER
Ervin Keith / [facility] Scheduling calling Mammogram order needs a new diagnosis code it doesn't pass medicare guidelines   Tasked to nursing

## 2019-07-01 ENCOUNTER — HOSPITAL ENCOUNTER (OUTPATIENT)
Dept: MAMMOGRAPHY | Facility: HOSPITAL | Age: 79
Discharge: HOME | End: 2019-07-01
Attending: INTERNAL MEDICINE
Payer: MEDICARE

## 2019-07-01 DIAGNOSIS — Z78.0 POSTMENOPAUSAL: ICD-10-CM

## 2019-07-01 DIAGNOSIS — Z12.39 BREAST CANCER SCREENING: ICD-10-CM

## 2019-07-01 PROCEDURE — 77067 SCR MAMMO BI INCL CAD: CPT | Performed by: INTERNAL MEDICINE

## 2019-07-01 PROCEDURE — 77063 BREAST TOMOSYNTHESIS BI: CPT | Performed by: INTERNAL MEDICINE

## 2019-07-05 ENCOUNTER — OFFICE VISIT (OUTPATIENT)
Dept: INTERNAL MEDICINE CLINIC | Facility: CLINIC | Age: 79
End: 2019-07-05
Payer: MEDICARE

## 2019-07-05 VITALS
SYSTOLIC BLOOD PRESSURE: 130 MMHG | WEIGHT: 170.81 LBS | TEMPERATURE: 98 F | HEART RATE: 70 BPM | BODY MASS INDEX: 33.54 KG/M2 | OXYGEN SATURATION: 97 % | HEIGHT: 60 IN | DIASTOLIC BLOOD PRESSURE: 74 MMHG

## 2019-07-05 DIAGNOSIS — J20.9 ACUTE BRONCHITIS, UNSPECIFIED ORGANISM: Primary | ICD-10-CM

## 2019-07-05 DIAGNOSIS — E78.00 HYPERCHOLESTEROLEMIA: ICD-10-CM

## 2019-07-05 DIAGNOSIS — J01.90 ACUTE SINUSITIS, RECURRENCE NOT SPECIFIED, UNSPECIFIED LOCATION: ICD-10-CM

## 2019-07-05 DIAGNOSIS — I10 HYPERTENSION, BENIGN: ICD-10-CM

## 2019-07-05 PROCEDURE — G0463 HOSPITAL OUTPT CLINIC VISIT: HCPCS | Performed by: INTERNAL MEDICINE

## 2019-07-05 PROCEDURE — 99214 OFFICE O/P EST MOD 30 MIN: CPT | Performed by: INTERNAL MEDICINE

## 2019-07-05 RX ORDER — DOXYCYCLINE HYCLATE 100 MG/1
100 CAPSULE ORAL 2 TIMES DAILY
Qty: 20 CAPSULE | Refills: 0 | Status: SHIPPED | OUTPATIENT
Start: 2019-07-05 | End: 2019-07-26

## 2019-07-05 NOTE — PROGRESS NOTES
Jeannette Mcnair is a 70year old female. HPI:   Patient presents with:  Cough: sx onset: Tuesday. c/o scratchy cough, occ productive (yellowish/white) hoarse voice. Denies fever or chills. No SOB.        69 y/o F with 3 d h/o +sinus and nasal congestion; ER OSMOTIC RELEASE 60 MG Oral Tablet 24 Hr TAKE 1 TABLET BY MOUTH EVERY DAY Disp: 90 tablet Rfl: 3   METOPROLOL SUCCINATE  MG Oral Tablet 24 Hr TAKE 1 TABLET BY MOUTH EVERY DAY Disp: 90 tablet Rfl: 3   FAMOTIDINE 40 MG Oral Tab TAKE 1 TABLET(40 MG) B ASSESSMENT/PLAN:   Acute bronchitis  Will give doxycycline 100 mg po BID x 10d     HTN  On nifedipine ER 60 mg po qD and metoprolol  mg po qD    hypercholesterolemia  On atorvastatin 10 mg po qHS         Orders This Visit:  No orders of the defined

## 2019-07-26 ENCOUNTER — TELEPHONE (OUTPATIENT)
Dept: INTERNAL MEDICINE CLINIC | Facility: CLINIC | Age: 79
End: 2019-07-26

## 2019-07-26 RX ORDER — DOXYCYCLINE HYCLATE 100 MG/1
100 CAPSULE ORAL 2 TIMES DAILY
Qty: 20 CAPSULE | Refills: 0 | Status: ON HOLD | OUTPATIENT
Start: 2019-07-26 | End: 2019-09-27

## 2019-07-26 NOTE — TELEPHONE ENCOUNTER
I spoke with patient and relayed Dr. Johann Galloway message. Prescription sent to her [facility] per MD order. Patient verbalized understanding. Invited patient to call with any questions or concerns. Patient says she will see Dr. Belkys Murray on Monday.

## 2019-07-26 NOTE — TELEPHONE ENCOUNTER
Spoke with patient. She was seen by Dr. Julian Harry on 7/5/19 for bronchitis and was prescribed \"doxycycline 100 mg po BID x 10d. \" Patient states \"everything stopped completely\" with abx, however symptoms returned about a week ago.  Symptoms include \"scratchy th

## 2019-07-26 NOTE — TELEPHONE ENCOUNTER
Pt. Was prescribed an antibiotic for her bronchitis. She has had a scratchy throat for a week.  Yesterday she started to have phlegm in her cough, stuffy nose and has to sleep with her mouth open she wants to know if she can be prescribed some doxycycline p

## 2019-09-10 ENCOUNTER — OFFICE VISIT (OUTPATIENT)
Dept: INTERNAL MEDICINE CLINIC | Facility: CLINIC | Age: 79
End: 2019-09-10
Payer: MEDICARE

## 2019-09-10 ENCOUNTER — APPOINTMENT (OUTPATIENT)
Dept: LAB | Age: 79
End: 2019-09-10
Attending: INTERNAL MEDICINE
Payer: MEDICARE

## 2019-09-10 VITALS
SYSTOLIC BLOOD PRESSURE: 130 MMHG | BODY MASS INDEX: 33.38 KG/M2 | TEMPERATURE: 98 F | HEART RATE: 76 BPM | OXYGEN SATURATION: 98 % | WEIGHT: 170 LBS | HEIGHT: 60 IN | DIASTOLIC BLOOD PRESSURE: 70 MMHG

## 2019-09-10 DIAGNOSIS — K58.1 IRRITABLE BOWEL SYNDROME WITH CONSTIPATION: ICD-10-CM

## 2019-09-10 DIAGNOSIS — N28.9 DISORDER OF KIDNEY AND URETER: ICD-10-CM

## 2019-09-10 DIAGNOSIS — Z23 NEED FOR INFLUENZA VACCINATION: Primary | ICD-10-CM

## 2019-09-10 DIAGNOSIS — E78.00 HYPERCHOLESTEROLEMIA: ICD-10-CM

## 2019-09-10 DIAGNOSIS — I10 HYPERTENSION, BENIGN: ICD-10-CM

## 2019-09-10 LAB
ANION GAP SERPL CALC-SCNC: 9 MMOL/L (ref 0–18)
BUN BLD-MCNC: 18 MG/DL (ref 7–18)
BUN/CREAT SERPL: 15.8 (ref 10–20)
CALCIUM BLD-MCNC: 9.1 MG/DL (ref 8.5–10.1)
CHLORIDE SERPL-SCNC: 107 MMOL/L (ref 98–112)
CHOLEST SMN-MCNC: 161 MG/DL (ref ?–200)
CO2 SERPL-SCNC: 27 MMOL/L (ref 21–32)
CREAT BLD-MCNC: 1.14 MG/DL (ref 0.55–1.02)
GLUCOSE BLD-MCNC: 103 MG/DL (ref 70–99)
HDLC SERPL-MCNC: 65 MG/DL (ref 40–59)
LDLC SERPL CALC-MCNC: 84 MG/DL (ref ?–100)
NONHDLC SERPL-MCNC: 96 MG/DL (ref ?–130)
OSMOLALITY SERPL CALC.SUM OF ELEC: 298 MOSM/KG (ref 275–295)
PATIENT FASTING: YES
PATIENT FASTING: YES
POTASSIUM SERPL-SCNC: 3.4 MMOL/L (ref 3.5–5.1)
SODIUM SERPL-SCNC: 143 MMOL/L (ref 136–145)
TRIGL SERPL-MCNC: 59 MG/DL (ref 30–149)
VLDLC SERPL CALC-MCNC: 12 MG/DL (ref 0–30)

## 2019-09-10 PROCEDURE — 80048 BASIC METABOLIC PNL TOTAL CA: CPT

## 2019-09-10 PROCEDURE — 99214 OFFICE O/P EST MOD 30 MIN: CPT | Performed by: INTERNAL MEDICINE

## 2019-09-10 PROCEDURE — G0463 HOSPITAL OUTPT CLINIC VISIT: HCPCS | Performed by: INTERNAL MEDICINE

## 2019-09-10 PROCEDURE — 90662 IIV NO PRSV INCREASED AG IM: CPT | Performed by: INTERNAL MEDICINE

## 2019-09-10 PROCEDURE — G0008 ADMIN INFLUENZA VIRUS VAC: HCPCS | Performed by: INTERNAL MEDICINE

## 2019-09-10 PROCEDURE — 80061 LIPID PANEL: CPT

## 2019-09-10 PROCEDURE — 36415 COLL VENOUS BLD VENIPUNCTURE: CPT

## 2019-09-10 NOTE — PROGRESS NOTES
Edwige Fung is a 70year old female. HPI:   She has been under increasing stress wit her declining .  She has had dx of IBS and no recent sx until about 2 weeks ago when she developed constipation and sensation of bloating and incomplete empytyin daily. Take Monday - Friday ) Disp: 90 tablet Rfl: 3   Multiple Vitamins-Minerals (CENTRUM ULTRA WOMENS) Oral Tab Take 1 tablet by mouth daily. Disp:  Rfl:    Calcium Carbonate-Vitamin D (CALCIUM 600 + D OR) Take 1 tablet by mouth 2 (two) times daily.    Wilian Salazar these issues and agrees to the plan. The patient is asked to return in 4 months.

## 2019-09-26 ENCOUNTER — TELEPHONE (OUTPATIENT)
Dept: INTERNAL MEDICINE CLINIC | Facility: CLINIC | Age: 79
End: 2019-09-26

## 2019-09-26 DIAGNOSIS — N28.9 RENAL INSUFFICIENCY: Primary | ICD-10-CM

## 2019-09-26 NOTE — TELEPHONE ENCOUNTER
Labs from 910: Minimal elevation of blood sugar, minimal decreased serum potassium. Is she taking her oral potassium? If so, increase to 2 one day and 1 the next day alternating in this pattern. Kidney function is minimally decreased. Lipids are good.

## 2019-09-26 NOTE — TELEPHONE ENCOUNTER
Called and Relayed MD's message to patient---verbalized understanding. Pt states that she takes KCL 10MEQ Monday-Friday. Order for BMP entered-instructed to have it done in 2 months    To Dr. Shelia Herring: does this changes your instructions for KCL?

## 2019-09-27 ENCOUNTER — ANESTHESIA EVENT (OUTPATIENT)
Dept: ENDOSCOPY | Facility: HOSPITAL | Age: 79
End: 2019-09-27
Payer: MEDICARE

## 2019-09-27 ENCOUNTER — HOSPITAL ENCOUNTER (OUTPATIENT)
Facility: HOSPITAL | Age: 79
Setting detail: HOSPITAL OUTPATIENT SURGERY
Discharge: HOME | End: 2019-09-27
Attending: INTERNAL MEDICINE | Admitting: INTERNAL MEDICINE
Payer: MEDICARE

## 2019-09-27 ENCOUNTER — ANESTHESIA (OUTPATIENT)
Dept: ENDOSCOPY | Facility: HOSPITAL | Age: 79
End: 2019-09-27
Payer: MEDICARE

## 2019-09-27 VITALS
WEIGHT: 165 LBS | DIASTOLIC BLOOD PRESSURE: 77 MMHG | OXYGEN SATURATION: 96 % | HEIGHT: 62 IN | TEMPERATURE: 97 F | RESPIRATION RATE: 16 BRPM | SYSTOLIC BLOOD PRESSURE: 124 MMHG | BODY MASS INDEX: 30.36 KG/M2 | HEART RATE: 74 BPM

## 2019-09-27 DIAGNOSIS — R19.4 CHANGE IN BOWEL HABITS: ICD-10-CM

## 2019-09-27 DIAGNOSIS — Z86.010 HISTORY OF COLON POLYPS: ICD-10-CM

## 2019-09-27 PROCEDURE — 88305 TISSUE EXAM BY PATHOLOGIST: CPT | Performed by: INTERNAL MEDICINE

## 2019-09-27 PROCEDURE — 0DBK8ZX EXCISION OF ASCENDING COLON, VIA NATURAL OR ARTIFICIAL OPENING ENDOSCOPIC, DIAGNOSTIC: ICD-10-PCS | Performed by: INTERNAL MEDICINE

## 2019-09-27 PROCEDURE — 0DBN8ZX EXCISION OF SIGMOID COLON, VIA NATURAL OR ARTIFICIAL OPENING ENDOSCOPIC, DIAGNOSTIC: ICD-10-PCS | Performed by: INTERNAL MEDICINE

## 2019-09-27 PROCEDURE — 0DBM8ZX EXCISION OF DESCENDING COLON, VIA NATURAL OR ARTIFICIAL OPENING ENDOSCOPIC, DIAGNOSTIC: ICD-10-PCS | Performed by: INTERNAL MEDICINE

## 2019-09-27 RX ORDER — HALOPERIDOL 5 MG/ML
0.25 INJECTION INTRAMUSCULAR ONCE AS NEEDED
Status: DISCONTINUED | OUTPATIENT
Start: 2019-09-27 | End: 2019-09-27 | Stop reason: HOSPADM

## 2019-09-27 RX ORDER — HYDROCODONE BITARTRATE AND ACETAMINOPHEN 5; 325 MG/1; MG/1
1 TABLET ORAL AS NEEDED
Status: DISCONTINUED | OUTPATIENT
Start: 2019-09-27 | End: 2019-09-27 | Stop reason: HOSPADM

## 2019-09-27 RX ORDER — HYDROMORPHONE HYDROCHLORIDE 1 MG/ML
0.2 INJECTION, SOLUTION INTRAMUSCULAR; INTRAVENOUS; SUBCUTANEOUS EVERY 5 MIN PRN
Status: DISCONTINUED | OUTPATIENT
Start: 2019-09-27 | End: 2019-09-27 | Stop reason: HOSPADM

## 2019-09-27 RX ORDER — PROCHLORPERAZINE EDISYLATE 5 MG/ML
5 INJECTION INTRAMUSCULAR; INTRAVENOUS ONCE AS NEEDED
Status: DISCONTINUED | OUTPATIENT
Start: 2019-09-27 | End: 2019-09-27 | Stop reason: HOSPADM

## 2019-09-27 RX ORDER — METOPROLOL TARTRATE 5 MG/5ML
2.5 INJECTION INTRAVENOUS ONCE
Status: DISCONTINUED | OUTPATIENT
Start: 2019-09-27 | End: 2019-09-27 | Stop reason: HOSPADM

## 2019-09-27 RX ORDER — SODIUM CHLORIDE 0.9 % (FLUSH) 0.9 %
10 SYRINGE (ML) INJECTION AS NEEDED
Status: CANCELLED | OUTPATIENT
Start: 2019-09-27

## 2019-09-27 RX ORDER — MORPHINE SULFATE 4 MG/ML
2 INJECTION, SOLUTION INTRAMUSCULAR; INTRAVENOUS EVERY 10 MIN PRN
Status: DISCONTINUED | OUTPATIENT
Start: 2019-09-27 | End: 2019-09-27 | Stop reason: HOSPADM

## 2019-09-27 RX ORDER — MIDAZOLAM HYDROCHLORIDE 1 MG/ML
1 INJECTION INTRAMUSCULAR; INTRAVENOUS EVERY 5 MIN PRN
Status: DISCONTINUED | OUTPATIENT
Start: 2019-09-27 | End: 2019-09-27

## 2019-09-27 RX ORDER — ONDANSETRON 2 MG/ML
4 INJECTION INTRAMUSCULAR; INTRAVENOUS ONCE AS NEEDED
Status: DISCONTINUED | OUTPATIENT
Start: 2019-09-27 | End: 2019-09-27 | Stop reason: HOSPADM

## 2019-09-27 RX ORDER — NALOXONE HYDROCHLORIDE 0.4 MG/ML
80 INJECTION, SOLUTION INTRAMUSCULAR; INTRAVENOUS; SUBCUTANEOUS AS NEEDED
Status: DISCONTINUED | OUTPATIENT
Start: 2019-09-27 | End: 2019-09-27 | Stop reason: HOSPADM

## 2019-09-27 RX ORDER — MORPHINE SULFATE 10 MG/ML
6 INJECTION, SOLUTION INTRAMUSCULAR; INTRAVENOUS EVERY 10 MIN PRN
Status: DISCONTINUED | OUTPATIENT
Start: 2019-09-27 | End: 2019-09-27 | Stop reason: HOSPADM

## 2019-09-27 RX ORDER — HYDROMORPHONE HYDROCHLORIDE 1 MG/ML
0.6 INJECTION, SOLUTION INTRAMUSCULAR; INTRAVENOUS; SUBCUTANEOUS EVERY 5 MIN PRN
Status: DISCONTINUED | OUTPATIENT
Start: 2019-09-27 | End: 2019-09-27 | Stop reason: HOSPADM

## 2019-09-27 RX ORDER — HYDROCODONE BITARTRATE AND ACETAMINOPHEN 5; 325 MG/1; MG/1
2 TABLET ORAL AS NEEDED
Status: DISCONTINUED | OUTPATIENT
Start: 2019-09-27 | End: 2019-09-27 | Stop reason: HOSPADM

## 2019-09-27 RX ORDER — HYDROMORPHONE HYDROCHLORIDE 1 MG/ML
0.4 INJECTION, SOLUTION INTRAMUSCULAR; INTRAVENOUS; SUBCUTANEOUS EVERY 5 MIN PRN
Status: DISCONTINUED | OUTPATIENT
Start: 2019-09-27 | End: 2019-09-27 | Stop reason: HOSPADM

## 2019-09-27 RX ORDER — SODIUM CHLORIDE, SODIUM LACTATE, POTASSIUM CHLORIDE, CALCIUM CHLORIDE 600; 310; 30; 20 MG/100ML; MG/100ML; MG/100ML; MG/100ML
INJECTION, SOLUTION INTRAVENOUS CONTINUOUS
Status: CANCELLED | OUTPATIENT
Start: 2019-09-27

## 2019-09-27 RX ORDER — MORPHINE SULFATE 4 MG/ML
4 INJECTION, SOLUTION INTRAMUSCULAR; INTRAVENOUS EVERY 10 MIN PRN
Status: DISCONTINUED | OUTPATIENT
Start: 2019-09-27 | End: 2019-09-27 | Stop reason: HOSPADM

## 2019-09-27 RX ORDER — SODIUM CHLORIDE, SODIUM LACTATE, POTASSIUM CHLORIDE, CALCIUM CHLORIDE 600; 310; 30; 20 MG/100ML; MG/100ML; MG/100ML; MG/100ML
INJECTION, SOLUTION INTRAVENOUS CONTINUOUS
Status: DISCONTINUED | OUTPATIENT
Start: 2019-09-27 | End: 2019-09-27

## 2019-09-27 RX ORDER — NALOXONE HYDROCHLORIDE 0.4 MG/ML
80 INJECTION, SOLUTION INTRAMUSCULAR; INTRAVENOUS; SUBCUTANEOUS AS NEEDED
Status: CANCELLED | OUTPATIENT
Start: 2019-09-27 | End: 2019-09-27

## 2019-09-27 RX ADMIN — SODIUM CHLORIDE, SODIUM LACTATE, POTASSIUM CHLORIDE, CALCIUM CHLORIDE: 600; 310; 30; 20 INJECTION, SOLUTION INTRAVENOUS at 13:08:00

## 2019-09-27 NOTE — ANESTHESIA POSTPROCEDURE EVALUATION
Patient: Jasbir Clarke    Procedure Summary     Date:  09/27/19 Room / Location:  [facility] ENDOSCOPY 01 / [facility] ENDOSCOPY    Anesthesia Start:  9118 Anesthesia Stop:  4054    Procedure:  COLONOSCOPY (N/A ) Diagnosis:       Change in bowel habits      History of

## 2019-09-27 NOTE — OPERATIVE REPORT
COLONOSCOPY REPORT    Patient Name:  Ross García Record #: [id number]  YOB: 1940  Date of Procedure: 9/27/2019    Referring physician: Vy Motley. Monique Alcantara MD    Surgeon:  Luis Fish.  Jose Alejandro Burr MD    Pre-op diagnosis: Change in bowel ha

## 2019-09-27 NOTE — ANESTHESIA PREPROCEDURE EVALUATION
Anesthesia PreOp Note    HPI:     Jeannette Mcnair is a 70year old female who presents for preoperative consultation requested by: Rogelio Reynolds MD    Date of Surgery: 9/27/2019    Procedure(s):  COLONOSCOPY  Indication: Change in bowel habits, History pressure    • High cholesterol    • High level of uric acid in blood 2009    10.4   • Pulmonary emboli [facility]) May/June 2015   • Pulmonary embolism (HCC)    • Renal disorder    • Renal insufficiency 2009   • S/P colonoscopy 1/12/2015       Past Surgical Hist No current Epic-ordered facility-administered medications on file. No current [facility]-ordered outpatient medications on file.       Ibuprofen               OTHER (SEE COMMENTS)    Comment:Kidney problems  Nsaids                  OTHER (SEE COMMENTS) current or ex partner: Not on file        Emotionally abused: Not on file        Physically abused: Not on file        Forced sexual activity: Not on file    Other Topics      Concerns:         Service: Not Asked        Blood Transfusions: Not Aske complications, and any alternative forms of anesthetic management. All of the patient's questions were answered to the best of my ability. The patient desires the anesthetic management as planned.   Wendy Norris  9/27/2019 11:45 AM

## 2019-09-27 NOTE — H&P
RE-PROCEDURE UPDATE    HPI: Reid Grey is a 70year old female. 10/18/1940. Patient presents for a colonoscopy.     ALLERGIES:   Ibuprofen               OTHER (SEE COMMENTS)    Comment:Kidney problems  Nsaids                  OTHER (SEE COMMENTS)

## 2019-09-30 NOTE — TELEPHONE ENCOUNTER
To on call Dr. Hipolito Ibarra: is this something you can address? See my clarifying message about KCL instructions. (Dr. Shantanu Valentin returns Wednesday).

## 2019-10-01 NOTE — TELEPHONE ENCOUNTER
Message noted. Last BMP reviewed. Dr. Yoni Al note reviewed.   I think since patient is taking 1 potassium chloride Monday through Friday I would follow Dr. Yoni Al recommendation for 2 potassium chloride tablets on one day to alternate with one the next

## 2019-10-01 NOTE — TELEPHONE ENCOUNTER
Drs. Noe Hashimoto message reviewed with pt who verbalized understanding. Pt will have repeat BMP in 2 months.

## 2019-10-02 NOTE — PROGRESS NOTES
10/2/2019  Klaudia [address]    Dear Keron Gallegos,     Here are the results from your recent testing :    During your colonoscopy 3 polyps were removed. The pathology showed that 2 of these polyps were adenomas.   This k

## 2019-10-02 NOTE — PROGRESS NOTES
10/2/2019    Re:  Oly Spangler   10/18/1940   [id number]       Dear Edu Graham,    I had the pleasure of seeing Oly Spangler for a change in bowel habits.     She underwent colonoscopy at [facility].    The colonoscopy revealed 3 small polyps a

## 2019-10-11 ENCOUNTER — TELEPHONE (OUTPATIENT)
Dept: INTERNAL MEDICINE CLINIC | Facility: CLINIC | Age: 79
End: 2019-10-11

## 2019-10-11 NOTE — TELEPHONE ENCOUNTER
Please call pt regarding her  Potassium  Every other day she takes two, should she take them together or spread out during the day?   Tasked to nursing to advise

## 2019-10-11 NOTE — TELEPHONE ENCOUNTER
To Nataliya Warren to advise-- potassium instructions in TT from 9/26. Does it make a difference if she takes two together or spread them out?

## 2019-11-19 ENCOUNTER — TELEPHONE (OUTPATIENT)
Dept: INTERNAL MEDICINE CLINIC | Facility: CLINIC | Age: 79
End: 2019-11-19

## 2019-11-19 NOTE — TELEPHONE ENCOUNTER
Pt requests call back regarding lab orders    She is to have regular blood panel & kidney check   Are both covered under the BMP order?       Please advise [phone]

## 2019-11-19 NOTE — TELEPHONE ENCOUNTER
I spoke with patient and relayed Dr. Zoya Klein message from his 9/26/19 telephone call. BMP was ordered in that call as well. Patient verbalized understanding. Explained that she should fast for the lab because it will check her sugar as well.  She verbalized

## 2019-11-22 ENCOUNTER — APPOINTMENT (OUTPATIENT)
Dept: LAB | Facility: HOSPITAL | Age: 79
End: 2019-11-22
Attending: INTERNAL MEDICINE
Payer: MEDICARE

## 2019-11-22 DIAGNOSIS — N28.9 RENAL INSUFFICIENCY: ICD-10-CM

## 2019-11-22 PROCEDURE — 80048 BASIC METABOLIC PNL TOTAL CA: CPT

## 2019-11-22 PROCEDURE — 36415 COLL VENOUS BLD VENIPUNCTURE: CPT

## 2019-12-06 ENCOUNTER — OFFICE VISIT (OUTPATIENT)
Dept: INTERNAL MEDICINE CLINIC | Facility: CLINIC | Age: 79
End: 2019-12-06
Payer: MEDICARE

## 2019-12-06 VITALS
DIASTOLIC BLOOD PRESSURE: 76 MMHG | TEMPERATURE: 98 F | BODY MASS INDEX: 31.38 KG/M2 | HEIGHT: 62 IN | SYSTOLIC BLOOD PRESSURE: 118 MMHG | OXYGEN SATURATION: 98 % | HEART RATE: 72 BPM | WEIGHT: 170.5 LBS

## 2019-12-06 DIAGNOSIS — E78.00 HYPERCHOLESTEROLEMIA: Primary | ICD-10-CM

## 2019-12-06 DIAGNOSIS — I26.99 OTHER PULMONARY EMBOLISM WITHOUT ACUTE COR PULMONALE, UNSPECIFIED CHRONICITY (HCC): ICD-10-CM

## 2019-12-06 DIAGNOSIS — I10 HYPERTENSION, BENIGN: ICD-10-CM

## 2019-12-06 DIAGNOSIS — R73.9 ELEVATED BLOOD SUGAR: ICD-10-CM

## 2019-12-06 PROCEDURE — G0463 HOSPITAL OUTPT CLINIC VISIT: HCPCS | Performed by: INTERNAL MEDICINE

## 2019-12-06 PROCEDURE — 99214 OFFICE O/P EST MOD 30 MIN: CPT | Performed by: INTERNAL MEDICINE

## 2019-12-06 NOTE — PROGRESS NOTES
Kirstie Bautista is a 70year old female. HPI:   She is generally stable but does have problems with uterine and bladder prolapse and had to have her pesaary temporarily removed. She has incomplete emptying of rectum as well. No blood in urine or stool.  No • Hematuria 1994    w/u   • High blood pressure    • High cholesterol    • High level of uric acid in blood 2009    10.4   • Pulmonary emboli [facility]) May/June 2015   • Pulmonary embolism (HCC)    • Renal disorder    • Renal insufficiency 2009   • S/P colon

## 2019-12-10 RX ORDER — NYSTATIN 100000 U/G
OINTMENT TOPICAL
Qty: 30 G | Refills: 1 | Status: SHIPPED | OUTPATIENT
Start: 2019-12-10 | End: 2020-09-30

## 2019-12-17 NOTE — MR AVS SNAPSHOT
[facility]  [facility] [address] [place] [address]  [phone]               Thank you for choosing us for your health care visit with Polo Deleon MD.  We are glad to serve you and happy to provide you with this summary of your visit.   Hill Metoprolol Succinate  MG Tb24   TAKE 1 TABLET BY MOUTH EVERY DAY   Commonly known as:   Toprol XL           NIFEDICAL XL 60 MG Tb24   Generic drug:  NIFEdipine ER Osmotic Release   TAKE 1 TABLET BY MOUTH DAILY           Omeprazole 40 MG Cpdr   TAKE 1 lumbar procedure

## 2020-01-07 RX ORDER — POTASSIUM CHLORIDE 750 MG/1
TABLET, EXTENDED RELEASE ORAL
Qty: 90 TABLET | Refills: 3 | OUTPATIENT
Start: 2020-01-07

## 2020-01-07 NOTE — TELEPHONE ENCOUNTER
Bhavesh calling pt said Dr Polo Sanon changed the directions of medication  Please send new rx with new directions   Tasked to rx

## 2020-01-08 RX ORDER — POTASSIUM CHLORIDE 750 MG/1
TABLET, EXTENDED RELEASE ORAL
Qty: 135 TABLET | Refills: 3 | Status: SHIPPED | OUTPATIENT
Start: 2020-01-08 | End: 2020-12-30

## 2020-01-08 NOTE — TELEPHONE ENCOUNTER
Pt. Is calling to check status of Rx for Potassium pt. Will be out of meds today ph.  # [phone]   Routed low to Rx

## 2020-01-08 NOTE — TELEPHONE ENCOUNTER
See 9/26/2019 telephone encounter for change in directions. Called patient and verified that she is alternating taking 2 tabs one day and 1 tab the next of the potassium. Rx sent with change in the directions.     Refill request is for a maintenance medicat

## 2020-03-04 ENCOUNTER — OFFICE VISIT (OUTPATIENT)
Dept: INTERNAL MEDICINE CLINIC | Facility: CLINIC | Age: 80
End: 2020-03-04
Payer: MEDICARE

## 2020-03-04 VITALS
DIASTOLIC BLOOD PRESSURE: 70 MMHG | HEIGHT: 62 IN | BODY MASS INDEX: 31.62 KG/M2 | OXYGEN SATURATION: 99 % | WEIGHT: 171.81 LBS | HEART RATE: 64 BPM | SYSTOLIC BLOOD PRESSURE: 124 MMHG | TEMPERATURE: 98 F

## 2020-03-04 DIAGNOSIS — G56.01 CARPAL TUNNEL SYNDROME OF RIGHT WRIST: ICD-10-CM

## 2020-03-04 DIAGNOSIS — G56.01 RIGHT CARPAL TUNNEL SYNDROME: Primary | ICD-10-CM

## 2020-03-04 DIAGNOSIS — I10 HYPERTENSION, BENIGN: ICD-10-CM

## 2020-03-04 PROCEDURE — 96372 THER/PROPH/DIAG INJ SC/IM: CPT | Performed by: INTERNAL MEDICINE

## 2020-03-04 PROCEDURE — G0463 HOSPITAL OUTPT CLINIC VISIT: HCPCS | Performed by: INTERNAL MEDICINE

## 2020-03-04 PROCEDURE — 99213 OFFICE O/P EST LOW 20 MIN: CPT | Performed by: INTERNAL MEDICINE

## 2020-03-04 RX ORDER — TRIAMCINOLONE ACETONIDE 40 MG/ML
40 INJECTION, SUSPENSION INTRA-ARTICULAR; INTRAMUSCULAR ONCE
Status: COMPLETED | OUTPATIENT
Start: 2020-03-04 | End: 2020-03-04

## 2020-03-04 RX ADMIN — TRIAMCINOLONE ACETONIDE 40 MG: 40 INJECTION, SUSPENSION INTRA-ARTICULAR; INTRAMUSCULAR at 11:17:00

## 2020-03-04 NOTE — PROGRESS NOTES
Edwige Fnug is a 70year old female. HPI:   She is here with pain in the right hand. She has paresthesias in digits 1,2,3 of the right hand. She has pain in the distal left ulnar bone. Sx started about 1 month ago. No fall or injury.   No symptoms on t • Pulmonary embolism ([facility] [address]    • Renal disorder    • Renal insufficiency 2009   • S/P colonoscopy 1/12/2015      Social History:  Social History    Tobacco Use      Smoking status: Never Smoker      Smokeless tobacco: Never Used    Alcohol use: No    Drug needed or at next appointment

## 2020-03-05 PROBLEM — G56.01 CARPAL TUNNEL SYNDROME OF RIGHT WRIST: Status: ACTIVE | Noted: 2020-03-05

## 2020-05-04 RX ORDER — NIFEDIPINE 60 MG/1
TABLET, EXTENDED RELEASE ORAL
Qty: 90 TABLET | Refills: 0 | Status: SHIPPED | OUTPATIENT
Start: 2020-05-04 | End: 2020-07-30

## 2020-05-04 RX ORDER — METOPROLOL SUCCINATE 100 MG/1
TABLET, EXTENDED RELEASE ORAL
Qty: 90 TABLET | Refills: 0 | Status: SHIPPED | OUTPATIENT
Start: 2020-05-04 | End: 2020-07-23

## 2020-05-04 RX ORDER — ATORVASTATIN CALCIUM 10 MG/1
TABLET, FILM COATED ORAL
Qty: 90 TABLET | Refills: 0 | Status: ON HOLD | OUTPATIENT
Start: 2020-05-04 | End: 2020-07-24

## 2020-05-04 RX ORDER — FAMOTIDINE 40 MG/1
TABLET, FILM COATED ORAL
Qty: 180 TABLET | Refills: 0 | Status: SHIPPED | OUTPATIENT
Start: 2020-05-04 | End: 2020-07-23

## 2020-06-22 ENCOUNTER — LAB ENCOUNTER (OUTPATIENT)
Dept: LAB | Facility: HOSPITAL | Age: 80
End: 2020-06-22
Attending: INTERNAL MEDICINE
Payer: MEDICARE

## 2020-06-22 DIAGNOSIS — I10 HYPERTENSION, BENIGN: ICD-10-CM

## 2020-06-22 DIAGNOSIS — E78.00 HYPERCHOLESTEROLEMIA: ICD-10-CM

## 2020-06-22 DIAGNOSIS — R73.9 ELEVATED BLOOD SUGAR: ICD-10-CM

## 2020-06-22 PROCEDURE — 83036 HEMOGLOBIN GLYCOSYLATED A1C: CPT

## 2020-06-22 PROCEDURE — 80053 COMPREHEN METABOLIC PANEL: CPT

## 2020-06-22 PROCEDURE — 36415 COLL VENOUS BLD VENIPUNCTURE: CPT

## 2020-06-22 PROCEDURE — 80061 LIPID PANEL: CPT

## 2020-06-22 PROCEDURE — 84443 ASSAY THYROID STIM HORMONE: CPT

## 2020-06-22 PROCEDURE — 85025 COMPLETE CBC W/AUTO DIFF WBC: CPT

## 2020-06-23 ENCOUNTER — TELEPHONE (OUTPATIENT)
Dept: INTERNAL MEDICINE CLINIC | Facility: CLINIC | Age: 80
End: 2020-06-23

## 2020-06-23 ENCOUNTER — OFFICE VISIT (OUTPATIENT)
Dept: INTERNAL MEDICINE CLINIC | Facility: CLINIC | Age: 80
End: 2020-06-23
Payer: MEDICARE

## 2020-06-23 VITALS
OXYGEN SATURATION: 98 % | WEIGHT: 164 LBS | HEIGHT: 62 IN | HEART RATE: 68 BPM | RESPIRATION RATE: 16 BRPM | SYSTOLIC BLOOD PRESSURE: 136 MMHG | TEMPERATURE: 97 F | BODY MASS INDEX: 30.18 KG/M2 | DIASTOLIC BLOOD PRESSURE: 76 MMHG

## 2020-06-23 DIAGNOSIS — B02.9 HERPES ZOSTER WITHOUT COMPLICATION: ICD-10-CM

## 2020-06-23 DIAGNOSIS — M54.50 ACUTE LOW BACK PAIN WITHOUT SCIATICA, UNSPECIFIED BACK PAIN LATERALITY: Primary | ICD-10-CM

## 2020-06-23 PROBLEM — B02.8 HERPES ZOSTER WITH COMPLICATION: Status: ACTIVE | Noted: 2020-06-23

## 2020-06-23 PROCEDURE — 99214 OFFICE O/P EST MOD 30 MIN: CPT | Performed by: INTERNAL MEDICINE

## 2020-06-23 PROCEDURE — G0463 HOSPITAL OUTPT CLINIC VISIT: HCPCS | Performed by: INTERNAL MEDICINE

## 2020-06-23 RX ORDER — ACYCLOVIR 800 MG/1
TABLET ORAL
Qty: 25 TABLET | Refills: 0 | Status: ON HOLD | OUTPATIENT
Start: 2020-06-23 | End: 2020-07-23

## 2020-06-23 RX ORDER — HYDROCODONE BITARTRATE AND ACETAMINOPHEN 5; 325 MG/1; MG/1
1 TABLET ORAL EVERY 4 HOURS PRN
Qty: 30 TABLET | Refills: 0 | Status: SHIPPED | OUTPATIENT
Start: 2020-06-23 | End: 2020-07-01

## 2020-06-23 NOTE — TELEPHONE ENCOUNTER
Patient feels she has sciatica pain in her left  buttock area - pain mostly staying in that area   Also has a gyne appt today at 1:45pm in [facility] for another issue that she doesn't think is connected  Was hoping to see Dr Cindi Erickson today either this mor

## 2020-06-23 NOTE — PROGRESS NOTES
Kaylyn Molina is a 70year old female. HPI:   She was doing some heavy lifting on Sat - flower pot and plaster garden dog and then on Sat night she had pain in the left LS region. Difficult to sleep. She has good fom of the low back.      She is going to • High cholesterol    • High level of uric acid in blood 2009    10.4   • Pulmonary emboli [facility]) May/June 2015   • Pulmonary embolism ([facility] [address]    • Renal disorder    • Renal insufficiency 2009   • S/P colonoscopy 1/12/2015      Social History:  Social Histo

## 2020-06-23 NOTE — TELEPHONE ENCOUNTER
To Dr. Tammie Valentin - see below -  Pt would like to be seen. Sx onset: scatica? - Saturday night. Unbearable, has not slept for 3 nights. During the day seems to improve at bit. Only able to take tylenol (due to kidney function) which hasn't helped much.   Meena Rae

## 2020-06-26 ENCOUNTER — TELEPHONE (OUTPATIENT)
Dept: INTERNAL MEDICINE CLINIC | Facility: CLINIC | Age: 80
End: 2020-06-26

## 2020-06-26 NOTE — TELEPHONE ENCOUNTER
I did discuss with Mariluz Vuong. She has not moved her bowels since Tuesday. This is most likely due to Anibal Memos and most likely is impacting the fact that she cannot feel enough pressure to void. I asked her to take milk of magnesia 1 ounce now and repeat tomorrow if no results and then to take MiraLAX 1 dose daily. I asked her to call me if she is not doing well. Her oral intake has been down and I have advised her to he did increase especially her fluids during this period of time.

## 2020-06-26 NOTE — TELEPHONE ENCOUNTER
Pt. Called stating she was diagnosed with shingles in her vaginal area and also the very bottom of her buttocks. She has noticed her urine stream is weaker than usual, and she does not feel a sense of urgency when she does have to go. Her last BM was last Tuesday night. She did mention that she is not eating as much as before. Pt. Is concerned. She would like to speak with Dr. Jaimie Vidal, she is looking for guidance. She can be reached at [phone].

## 2020-06-29 NOTE — TELEPHONE ENCOUNTER
Anti bacterial medication/Valacyclovir patient  is taking for shingles was making it hard for her to urinate  Patient cut back & it got better  Please advise if she should continue taking    [phone]

## 2020-06-29 NOTE — TELEPHONE ENCOUNTER
As FYI to DR. LINARES - called patient and relayed DR. LINARES message - verbalized understanding.  Patient states pain is a little better - she cut back on pain medications

## 2020-06-30 ENCOUNTER — TELEPHONE (OUTPATIENT)
Dept: INTERNAL MEDICINE CLINIC | Facility: CLINIC | Age: 80
End: 2020-06-30

## 2020-06-30 RX ORDER — CEFADROXIL 500 MG/1
500 CAPSULE ORAL 2 TIMES DAILY
Qty: 14 CAPSULE | Refills: 0 | Status: SHIPPED | OUTPATIENT
Start: 2020-06-30 | End: 2020-07-07

## 2020-06-30 NOTE — TELEPHONE ENCOUNTER
1. S/w patient and notified that RX abx sent to pharmacy. Patient wonders if Homero Shoshone will cause diarrhea? States, she's had issues in the past with antibiotics causing diarrhea.  If MD thinks she will be fine, she will take as directed, otherwise she mentio

## 2020-06-30 NOTE — TELEPHONE ENCOUNTER
Patient is calling to speak with Dr Neisha Chen. Patient is recently recovering from shingles. Patient noticed Saturday that she had an infection in her belly button and put some cream (Nystatin) on it.  Patient states it seems to be getting better but now her be

## 2020-07-01 RX ORDER — HYDROCODONE BITARTRATE AND ACETAMINOPHEN 5; 325 MG/1; MG/1
1 TABLET ORAL EVERY 4 HOURS PRN
Qty: 40 TABLET | Refills: 0 | Status: ON HOLD | OUTPATIENT
Start: 2020-07-01 | End: 2020-07-23

## 2020-07-01 RX ORDER — DOXYCYCLINE HYCLATE 100 MG
100 TABLET ORAL 2 TIMES DAILY
Qty: 14 TABLET | Refills: 0 | Status: SHIPPED | OUTPATIENT
Start: 2020-07-01 | End: 2020-07-03 | Stop reason: ALTCHOICE

## 2020-07-01 NOTE — TELEPHONE ENCOUNTER
Pt is leaving town just waiting on her rx's to be called in. Asking to please try in get this done in the next hour.

## 2020-07-01 NOTE — TELEPHONE ENCOUNTER
To DR.C Marsha PLATA pending  , Alondra Han is controlled ,once sent please route to clinical so we can contact patient thanks

## 2020-07-03 ENCOUNTER — HOSPITAL ENCOUNTER (EMERGENCY)
Facility: HOSPITAL | Age: 80
Discharge: HOME | End: 2020-07-03
Attending: EMERGENCY MEDICINE
Payer: MEDICARE

## 2020-07-03 VITALS
BODY MASS INDEX: 29 KG/M2 | TEMPERATURE: 98 F | HEART RATE: 63 BPM | OXYGEN SATURATION: 98 % | SYSTOLIC BLOOD PRESSURE: 138 MMHG | WEIGHT: 160 LBS | DIASTOLIC BLOOD PRESSURE: 80 MMHG | RESPIRATION RATE: 12 BRPM

## 2020-07-03 DIAGNOSIS — N28.9 RENAL INSUFFICIENCY: ICD-10-CM

## 2020-07-03 DIAGNOSIS — N30.00 ACUTE CYSTITIS WITHOUT HEMATURIA: Primary | ICD-10-CM

## 2020-07-03 DIAGNOSIS — B02.9 HERPES ZOSTER WITHOUT COMPLICATION: ICD-10-CM

## 2020-07-03 DIAGNOSIS — L30.9 DERMATITIS: ICD-10-CM

## 2020-07-03 LAB
ANION GAP SERPL CALC-SCNC: 5 MMOL/L (ref 0–18)
BILIRUB UR QL: NEGATIVE
BUN BLD-MCNC: 24 MG/DL (ref 7–18)
BUN/CREAT SERPL: 18.3 (ref 10–20)
CALCIUM BLD-MCNC: 9.1 MG/DL (ref 8.5–10.1)
CHLORIDE SERPL-SCNC: 107 MMOL/L (ref 98–112)
CO2 SERPL-SCNC: 30 MMOL/L (ref 21–32)
COLOR UR: YELLOW
CREAT BLD-MCNC: 1.31 MG/DL (ref 0.55–1.02)
GLUCOSE BLD-MCNC: 107 MG/DL (ref 70–99)
GLUCOSE UR-MCNC: NEGATIVE MG/DL
HYALINE CASTS #/AREA URNS AUTO: 13 /LPF
KETONES UR-MCNC: NEGATIVE MG/DL
NITRITE UR QL STRIP.AUTO: NEGATIVE
OSMOLALITY SERPL CALC.SUM OF ELEC: 299 MOSM/KG (ref 275–295)
PH UR: 5 [PH] (ref 5–8)
POTASSIUM SERPL-SCNC: 3.4 MMOL/L (ref 3.5–5.1)
PROT UR-MCNC: 30 MG/DL
RBC #/AREA URNS AUTO: 39 /HPF
SODIUM SERPL-SCNC: 142 MMOL/L (ref 136–145)
SP GR UR STRIP: 1.02 (ref 1–1.03)
UROBILINOGEN UR STRIP-ACNC: <2
WBC #/AREA URNS AUTO: 160 /HPF

## 2020-07-03 PROCEDURE — 81001 URINALYSIS AUTO W/SCOPE: CPT | Performed by: EMERGENCY MEDICINE

## 2020-07-03 PROCEDURE — 36415 COLL VENOUS BLD VENIPUNCTURE: CPT

## 2020-07-03 PROCEDURE — 87077 CULTURE AEROBIC IDENTIFY: CPT | Performed by: EMERGENCY MEDICINE

## 2020-07-03 PROCEDURE — 99284 EMERGENCY DEPT VISIT MOD MDM: CPT

## 2020-07-03 PROCEDURE — 80048 BASIC METABOLIC PNL TOTAL CA: CPT | Performed by: EMERGENCY MEDICINE

## 2020-07-03 PROCEDURE — 87086 URINE CULTURE/COLONY COUNT: CPT | Performed by: EMERGENCY MEDICINE

## 2020-07-03 RX ORDER — SULFAMETHOXAZOLE AND TRIMETHOPRIM 800; 160 MG/1; MG/1
1 TABLET ORAL 2 TIMES DAILY
Qty: 14 TABLET | Refills: 0 | Status: SHIPPED | OUTPATIENT
Start: 2020-07-03 | End: 2020-07-10

## 2020-07-03 NOTE — ED INITIAL ASSESSMENT (HPI)
Patient notes difficulty urinating x1 week. Denies hematuria   Denies fevers. Patient recently on antiviral for shingles. Shingles to vaginal area. Burning noted.

## 2020-07-03 NOTE — ED NOTES
Pt provided with discharge instructions and prescription sent to pts pharmacy. Verbalized understanding for plan of care at home and follow up. All questions/concerns addressed prior to discharge.    Pt ambulatory out of er with steady gait

## 2020-07-03 NOTE — ED PROVIDER NOTES
Patient Seen in: [facility] AND [facility] Emergency Department      History   Patient presents with:  Urinary Symptoms    Stated Complaint: difficulty urinating    HPI    The patient is a 28-year-old female who presents with 3 to 4 days of difficulty urinating. Positive for stated complaint: difficulty urinating  Other systems are as noted in HPI. Constitutional and vital signs reviewed. All other systems reviewed and negative except as noted above.     Physical Exam     ED Triage Vitals [07/03/20 1401]   BP ED Course     Labs Reviewed   URINALYSIS WITH CULTURE REFLEX - Abnormal; Notable for the following components:       Result Value    Clarity Urine Cloudy (*)     Blood Urine Small (*)     Protein Urine 30  (*)     Leukocyte Esterase Urine Large (*)     Hya Discharge Medication List as of 7/3/2020  3:46 PM    START taking these medications    Sulfamethoxazole-TMP -160 MG Oral Tab per tablet  Take 1 tablet by mouth 2 (two) times daily for 7 days. , Normal, Disp-14 tablet, R-0

## 2020-07-06 ENCOUNTER — TELEPHONE (OUTPATIENT)
Dept: INTERNAL MEDICINE CLINIC | Facility: CLINIC | Age: 80
End: 2020-07-06

## 2020-07-06 ENCOUNTER — OFFICE VISIT (OUTPATIENT)
Dept: INTERNAL MEDICINE CLINIC | Facility: CLINIC | Age: 80
End: 2020-07-06
Payer: MEDICARE

## 2020-07-06 VITALS
SYSTOLIC BLOOD PRESSURE: 120 MMHG | BODY MASS INDEX: 29.67 KG/M2 | WEIGHT: 161.25 LBS | HEART RATE: 74 BPM | DIASTOLIC BLOOD PRESSURE: 80 MMHG | OXYGEN SATURATION: 98 % | HEIGHT: 62 IN | TEMPERATURE: 97 F

## 2020-07-06 DIAGNOSIS — N30.00 ACUTE CYSTITIS WITHOUT HEMATURIA: ICD-10-CM

## 2020-07-06 DIAGNOSIS — M54.50 ACUTE LOW BACK PAIN WITHOUT SCIATICA, UNSPECIFIED BACK PAIN LATERALITY: Primary | ICD-10-CM

## 2020-07-06 DIAGNOSIS — E78.00 HYPERCHOLESTEREMIA: ICD-10-CM

## 2020-07-06 PROCEDURE — 99214 OFFICE O/P EST MOD 30 MIN: CPT | Performed by: INTERNAL MEDICINE

## 2020-07-06 PROCEDURE — G0463 HOSPITAL OUTPT CLINIC VISIT: HCPCS | Performed by: INTERNAL MEDICINE

## 2020-07-06 RX ORDER — TRIAMCINOLONE ACETONIDE 40 MG/ML
40 INJECTION, SUSPENSION INTRA-ARTICULAR; INTRAMUSCULAR ONCE
Status: DISCONTINUED | OUTPATIENT
Start: 2020-07-06 | End: 2020-07-06

## 2020-07-06 NOTE — TELEPHONE ENCOUNTER
Pt asking if DrMILAGROS is ok that pt cant get in to see a urologist till 7/27th. That was the first opening thy had.

## 2020-07-06 NOTE — PROGRESS NOTES
Candance Lente is a 70year old female. HPI:   She has herpes zoster of the left perineal area and left upper post thigh dx about 1 week ago.      She has noted over the last several days difficulty voiding in that she does not have a sensation of the need mouth one day alternating with 1 tablet the next day 135 tablet 3   • nystatin [id number] UNIT/GM External Ointment APPLY SMALL AMOUNT TO THE AFFECTED AREA 2 TO 3 TIMES DAILY.  AS NEEDED 30 g 1   • Multiple Vitamins-Minerals (CENTRUM ULTRA WOMENS) Oral Tab Take analgesics in the last several days. 2. Acute cystitis without hematuria  Recent culture has grown out staph, not aureus. This is most likely not the cause of her symptoms but complete antibiotics anyway.     She has voiding symptoms that require  tasia

## 2020-07-07 ENCOUNTER — TELEPHONE (OUTPATIENT)
Dept: INTERNAL MEDICINE CLINIC | Facility: CLINIC | Age: 80
End: 2020-07-07

## 2020-07-07 NOTE — TELEPHONE ENCOUNTER
Patient states she has been treated for shingles for awhile now. Patient states that the shingles are in the vaginal area.  Patient spoke to a friend regarding this and was informed to call Dr Belkys Murray and ask if her problems with urinating has to do with the

## 2020-07-07 NOTE — TELEPHONE ENCOUNTER
I spoke with patient and relayed Dr. Seamus Devi message. She verbalized understanding. She is scheduled to see urology on Monday. I invited patient to call back if she has any questions or concerns prior to her appointment.

## 2020-07-08 ENCOUNTER — TELEPHONE (OUTPATIENT)
Dept: INTERNAL MEDICINE CLINIC | Facility: CLINIC | Age: 80
End: 2020-07-08

## 2020-07-08 NOTE — TELEPHONE ENCOUNTER
Please call pt  Has been dealing with shingles and UTI  Pt has been taking Sulfameth-trmethoprim  Pt has been taking for 5 days, no trouble until this morning, pt had attack of diarrhea, urgent, should pt stop medication?   Is due to take a dose tonight  Ta

## 2020-07-08 NOTE — TELEPHONE ENCOUNTER
Pt states has been dealing with shingles and UTI  Been on Sulfa for 5 days and this am had a sudden onset of loose stool - only 1 time    Nothing since no abd pain   Wondering if she should stop sulfa med this evening   Was to be on meds x7 days bid   Norman

## 2020-07-14 ENCOUNTER — LAB ENCOUNTER (OUTPATIENT)
Dept: LAB | Facility: HOSPITAL | Age: 80
End: 2020-07-14
Attending: INTERNAL MEDICINE
Payer: MEDICARE

## 2020-07-14 DIAGNOSIS — B02.9 HERPES ZOSTER WITHOUT COMPLICATION: ICD-10-CM

## 2020-07-14 DIAGNOSIS — E78.00 HYPERCHOLESTEREMIA: ICD-10-CM

## 2020-07-14 DIAGNOSIS — M54.50 ACUTE LOW BACK PAIN WITHOUT SCIATICA, UNSPECIFIED BACK PAIN LATERALITY: ICD-10-CM

## 2020-07-14 LAB
ALBUMIN SERPL-MCNC: 3.6 G/DL (ref 3.4–5)
ALBUMIN/GLOB SERPL: 1.2 {RATIO} (ref 1–2)
ALP LIVER SERPL-CCNC: 87 U/L (ref 55–142)
ALT SERPL-CCNC: 31 U/L (ref 13–56)
ANION GAP SERPL CALC-SCNC: 6 MMOL/L (ref 0–18)
AST SERPL-CCNC: 22 U/L (ref 15–37)
BASOPHILS # BLD AUTO: 0.05 X10(3) UL (ref 0–0.2)
BASOPHILS NFR BLD AUTO: 0.7 %
BILIRUB SERPL-MCNC: 0.8 MG/DL (ref 0.1–2)
BUN BLD-MCNC: 20 MG/DL (ref 7–18)
BUN/CREAT SERPL: 18.3 (ref 10–20)
CALCIUM BLD-MCNC: 9 MG/DL (ref 8.5–10.1)
CHLORIDE SERPL-SCNC: 108 MMOL/L (ref 98–112)
CHOLEST SMN-MCNC: 142 MG/DL (ref ?–200)
CO2 SERPL-SCNC: 28 MMOL/L (ref 21–32)
CREAT BLD-MCNC: 1.09 MG/DL (ref 0.55–1.02)
DEPRECATED RDW RBC AUTO: 44 FL (ref 35.1–46.3)
EOSINOPHIL # BLD AUTO: 0.24 X10(3) UL (ref 0–0.7)
EOSINOPHIL NFR BLD AUTO: 3.5 %
ERYTHROCYTE [DISTWIDTH] IN BLOOD BY AUTOMATED COUNT: 12.6 % (ref 11–15)
GLOBULIN PLAS-MCNC: 3.1 G/DL (ref 2.8–4.4)
GLUCOSE BLD-MCNC: 93 MG/DL (ref 70–99)
HCT VFR BLD AUTO: 37.6 % (ref 35–48)
HDLC SERPL-MCNC: 57 MG/DL (ref 40–59)
HGB BLD-MCNC: 12.6 G/DL (ref 12–16)
IMM GRANULOCYTES # BLD AUTO: 0.03 X10(3) UL (ref 0–1)
IMM GRANULOCYTES NFR BLD: 0.4 %
LDLC SERPL CALC-MCNC: 73 MG/DL (ref ?–100)
LYMPHOCYTES # BLD AUTO: 2.4 X10(3) UL (ref 1–4)
LYMPHOCYTES NFR BLD AUTO: 34.5 %
M PROTEIN MFR SERPL ELPH: 6.7 G/DL (ref 6.4–8.2)
MCH RBC QN AUTO: 32.1 PG (ref 26–34)
MCHC RBC AUTO-ENTMCNC: 33.5 G/DL (ref 31–37)
MCV RBC AUTO: 95.7 FL (ref 80–100)
MONOCYTES # BLD AUTO: 0.83 X10(3) UL (ref 0.1–1)
MONOCYTES NFR BLD AUTO: 11.9 %
NEUTROPHILS # BLD AUTO: 3.4 X10 (3) UL (ref 1.5–7.7)
NEUTROPHILS # BLD AUTO: 3.4 X10(3) UL (ref 1.5–7.7)
NEUTROPHILS NFR BLD AUTO: 49 %
NONHDLC SERPL-MCNC: 85 MG/DL (ref ?–130)
OSMOLALITY SERPL CALC.SUM OF ELEC: 296 MOSM/KG (ref 275–295)
PATIENT FASTING Y/N/NP: YES
PATIENT FASTING Y/N/NP: YES
PLATELET # BLD AUTO: 328 10(3)UL (ref 150–450)
POTASSIUM SERPL-SCNC: 3.4 MMOL/L (ref 3.5–5.1)
RBC # BLD AUTO: 3.93 X10(6)UL (ref 3.8–5.3)
SODIUM SERPL-SCNC: 142 MMOL/L (ref 136–145)
TRIGL SERPL-MCNC: 61 MG/DL (ref 30–149)
VLDLC SERPL CALC-MCNC: 12 MG/DL (ref 0–30)
WBC # BLD AUTO: 7 X10(3) UL (ref 4–11)

## 2020-07-14 PROCEDURE — 36415 COLL VENOUS BLD VENIPUNCTURE: CPT

## 2020-07-14 PROCEDURE — 85025 COMPLETE CBC W/AUTO DIFF WBC: CPT

## 2020-07-14 PROCEDURE — 80053 COMPREHEN METABOLIC PANEL: CPT

## 2020-07-14 PROCEDURE — 80061 LIPID PANEL: CPT

## 2020-07-15 ENCOUNTER — HOSPITAL ENCOUNTER (OUTPATIENT)
Dept: ULTRASOUND IMAGING | Age: 80
Discharge: HOME | End: 2020-07-15
Attending: INTERNAL MEDICINE
Payer: MEDICARE

## 2020-07-15 DIAGNOSIS — N30.00 ACUTE CYSTITIS WITHOUT HEMATURIA: ICD-10-CM

## 2020-07-15 PROCEDURE — 76770 US EXAM ABDO BACK WALL COMP: CPT | Performed by: INTERNAL MEDICINE

## 2020-07-23 ENCOUNTER — APPOINTMENT (OUTPATIENT)
Dept: MRI IMAGING | Facility: HOSPITAL | Age: 80
DRG: 065 | End: 2020-07-23
Attending: EMERGENCY MEDICINE
Payer: MEDICARE

## 2020-07-23 ENCOUNTER — HOSPITAL ENCOUNTER (INPATIENT)
Facility: HOSPITAL | Age: 80
LOS: 1 days | Discharge: HOME | DRG: 065 | End: 2020-07-24
Attending: EMERGENCY MEDICINE | Admitting: HOSPITALIST
Payer: MEDICARE

## 2020-07-23 ENCOUNTER — TELEPHONE (OUTPATIENT)
Dept: INTERNAL MEDICINE CLINIC | Facility: CLINIC | Age: 80
End: 2020-07-23

## 2020-07-23 DIAGNOSIS — N30.00 ACUTE CYSTITIS WITHOUT HEMATURIA: ICD-10-CM

## 2020-07-23 DIAGNOSIS — I63.9 THALAMIC STROKE (HCC): Primary | ICD-10-CM

## 2020-07-23 PROBLEM — I63.81 THALAMIC STROKE (HCC): Status: ACTIVE | Noted: 2020-07-23

## 2020-07-23 LAB
ANION GAP SERPL CALC-SCNC: 4 MMOL/L (ref 0–18)
BASOPHILS # BLD AUTO: 0.05 X10(3) UL (ref 0–0.2)
BASOPHILS NFR BLD AUTO: 0.6 %
BILIRUB UR QL: NEGATIVE
BUN BLD-MCNC: 16 MG/DL (ref 7–18)
BUN/CREAT SERPL: 15.8 (ref 10–20)
CALCIUM BLD-MCNC: 9 MG/DL (ref 8.5–10.1)
CHLORIDE SERPL-SCNC: 110 MMOL/L (ref 98–112)
CO2 SERPL-SCNC: 28 MMOL/L (ref 21–32)
COLOR UR: YELLOW
CREAT BLD-MCNC: 1.01 MG/DL (ref 0.55–1.02)
DEPRECATED RDW RBC AUTO: 43.9 FL (ref 35.1–46.3)
EOSINOPHIL # BLD AUTO: 0.18 X10(3) UL (ref 0–0.7)
EOSINOPHIL NFR BLD AUTO: 2 %
ERYTHROCYTE [DISTWIDTH] IN BLOOD BY AUTOMATED COUNT: 12.6 % (ref 11–15)
GLUCOSE BLD-MCNC: 101 MG/DL (ref 70–99)
GLUCOSE BLDC GLUCOMTR-MCNC: 85 MG/DL (ref 70–99)
GLUCOSE UR-MCNC: NEGATIVE MG/DL
HCT VFR BLD AUTO: 41.5 % (ref 35–48)
HGB BLD-MCNC: 14.1 G/DL (ref 12–16)
HYALINE CASTS #/AREA URNS AUTO: 3 /LPF
IMM GRANULOCYTES # BLD AUTO: 0.03 X10(3) UL (ref 0–1)
IMM GRANULOCYTES NFR BLD: 0.3 %
KETONES UR-MCNC: NEGATIVE MG/DL
LYMPHOCYTES # BLD AUTO: 1.47 X10(3) UL (ref 1–4)
LYMPHOCYTES NFR BLD AUTO: 16.4 %
MCH RBC QN AUTO: 32.3 PG (ref 26–34)
MCHC RBC AUTO-ENTMCNC: 34 G/DL (ref 31–37)
MCV RBC AUTO: 95.2 FL (ref 80–100)
MONOCYTES # BLD AUTO: 0.81 X10(3) UL (ref 0.1–1)
MONOCYTES NFR BLD AUTO: 9.1 %
NEUTROPHILS # BLD AUTO: 6.41 X10 (3) UL (ref 1.5–7.7)
NEUTROPHILS # BLD AUTO: 6.41 X10(3) UL (ref 1.5–7.7)
NEUTROPHILS NFR BLD AUTO: 71.6 %
NITRITE UR QL STRIP.AUTO: NEGATIVE
OSMOLALITY SERPL CALC.SUM OF ELEC: 295 MOSM/KG (ref 275–295)
PH UR: 6 [PH] (ref 5–8)
PLATELET # BLD AUTO: 273 10(3)UL (ref 150–450)
POTASSIUM SERPL-SCNC: 3.9 MMOL/L (ref 3.5–5.1)
PROT UR-MCNC: 30 MG/DL
RBC # BLD AUTO: 4.36 X10(6)UL (ref 3.8–5.3)
RBC #/AREA URNS AUTO: 15 /HPF
SARS-COV-2 RNA RESP QL NAA+PROBE: NOT DETECTED
SODIUM SERPL-SCNC: 142 MMOL/L (ref 136–145)
SP GR UR STRIP: 1.02 (ref 1–1.03)
UROBILINOGEN UR STRIP-ACNC: <2
WBC # BLD AUTO: 9 X10(3) UL (ref 4–11)
WBC #/AREA URNS AUTO: 51 /HPF

## 2020-07-23 PROCEDURE — 99223 1ST HOSP IP/OBS HIGH 75: CPT | Performed by: HOSPITALIST

## 2020-07-23 PROCEDURE — 99223 1ST HOSP IP/OBS HIGH 75: CPT | Performed by: OTHER

## 2020-07-23 PROCEDURE — 70551 MRI BRAIN STEM W/O DYE: CPT | Performed by: EMERGENCY MEDICINE

## 2020-07-23 RX ORDER — ASPIRIN 81 MG/1
81 TABLET, CHEWABLE ORAL ONCE
Status: COMPLETED | OUTPATIENT
Start: 2020-07-23 | End: 2020-07-23

## 2020-07-23 RX ORDER — NIFEDIPINE 60 MG/1
60 TABLET, EXTENDED RELEASE ORAL DAILY
Status: DISCONTINUED | OUTPATIENT
Start: 2020-07-23 | End: 2020-07-24

## 2020-07-23 RX ORDER — ONDANSETRON 2 MG/ML
4 INJECTION INTRAMUSCULAR; INTRAVENOUS EVERY 6 HOURS PRN
Status: DISCONTINUED | OUTPATIENT
Start: 2020-07-23 | End: 2020-07-24

## 2020-07-23 RX ORDER — ASPIRIN 81 MG/1
81 TABLET, CHEWABLE ORAL DAILY
Status: DISCONTINUED | OUTPATIENT
Start: 2020-07-24 | End: 2020-07-24

## 2020-07-23 RX ORDER — METOCLOPRAMIDE HYDROCHLORIDE 5 MG/ML
5 INJECTION INTRAMUSCULAR; INTRAVENOUS EVERY 8 HOURS PRN
Status: DISCONTINUED | OUTPATIENT
Start: 2020-07-23 | End: 2020-07-24

## 2020-07-23 RX ORDER — CLOPIDOGREL BISULFATE 75 MG/1
75 TABLET ORAL DAILY
Status: DISCONTINUED | OUTPATIENT
Start: 2020-07-24 | End: 2020-07-24

## 2020-07-23 RX ORDER — CLOPIDOGREL BISULFATE 75 MG/1
75 TABLET ORAL ONCE
Status: COMPLETED | OUTPATIENT
Start: 2020-07-23 | End: 2020-07-23

## 2020-07-23 RX ORDER — FAMOTIDINE 20 MG/1
40 TABLET ORAL NIGHTLY
Status: DISCONTINUED | OUTPATIENT
Start: 2020-07-23 | End: 2020-07-24

## 2020-07-23 RX ORDER — ATORVASTATIN CALCIUM 10 MG/1
10 TABLET, FILM COATED ORAL EVERY EVENING
Status: DISCONTINUED | OUTPATIENT
Start: 2020-07-23 | End: 2020-07-24

## 2020-07-23 RX ORDER — ACETAMINOPHEN 325 MG/1
650 TABLET ORAL EVERY 6 HOURS PRN
Status: DISCONTINUED | OUTPATIENT
Start: 2020-07-23 | End: 2020-07-24

## 2020-07-23 RX ORDER — SODIUM CHLORIDE 0.9 % (FLUSH) 0.9 %
3 SYRINGE (ML) INJECTION AS NEEDED
Status: DISCONTINUED | OUTPATIENT
Start: 2020-07-23 | End: 2020-07-24

## 2020-07-23 RX ORDER — METOPROLOL SUCCINATE 100 MG/1
100 TABLET, EXTENDED RELEASE ORAL DAILY
Status: DISCONTINUED | OUTPATIENT
Start: 2020-07-23 | End: 2020-07-24

## 2020-07-23 RX ORDER — TEMAZEPAM 7.5 MG/1
7.5 CAPSULE ORAL NIGHTLY PRN
Status: DISCONTINUED | OUTPATIENT
Start: 2020-07-23 | End: 2020-07-24

## 2020-07-23 RX ORDER — HEPARIN SODIUM 5000 [USP'U]/ML
5000 INJECTION, SOLUTION INTRAVENOUS; SUBCUTANEOUS EVERY 12 HOURS SCHEDULED
Status: DISCONTINUED | OUTPATIENT
Start: 2020-07-23 | End: 2020-07-24

## 2020-07-23 NOTE — CONSULTS
Neurology Inpatient Consult Note    Marvin Rebolledo : 10/18/1940   Referring Physician: Dr. Noelle Beltran  HPI:     Marvin Rebolledo is a 70year old female who is being seen in neurologic evaluation.     Patient initially came in with right leg weakness, di Never Smoker      Smokeless tobacco: Never Used    Substance and Sexual Activity      Alcohol use: No      Drug use: No    Other Topics      Concerns:        Caffeine Concern: No        ROS:   GENERAL: no fevers, no chills  SKIN: no new lesions or rashes patient in lay terms and addressed her questions and concerns      Neurology service will continue to follow. Please page with any questions / concerns.     Veronica Lai MD  [facility]  [phone]      For our mutual protection, dur

## 2020-07-23 NOTE — ED PROVIDER NOTES
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     Social History    Tobacco Use      Smoking status: Never Smoker Lumbar back: Normal.      Comments: While laying in the bed the patient has 5/5 muscle strength to bilateral lower extremities and bilateral upper extremities.   On ambulation the patient ambulates slowly and very deliberately with her right leg which she s CBC W/ DIFFERENTIAL[[id number]]                              Final result                 Please view results for these tests on the individual orders.    RAINBOW DRAW BLUE   RAINBOW DRAW LAVENDER   RAINBOW DRAW LIGHT GREEN   RAINBOW DRAW GOLD   URINE CU administration in time range)   temazepam (RESTORIL) cap 7.5 mg (has no administration in time range)   aspirin chewable tab 81 mg (81 mg Oral Given 7/23/20 8656)   Clopidogrel Bisulfate (PLAVIX) tab 75 mg (75 mg Oral Given 7/23/20 1556)   CefTRIAXone Sodi

## 2020-07-23 NOTE — ED NOTES
Orders for admission, patient is aware of plan and ready to go upstairs.  Any questions, please call ED RN Fauzia Nur   at extension [phone]

## 2020-07-23 NOTE — ED INITIAL ASSESSMENT (HPI)
Right leg feels \"wobbly. \" onset yesterday. Ambulatory with steady gait. States she had 2 episodes of this yesterday and 1 time today and then it would resolve. No other symptoms. Clear speech. States these episodes only last a few seconds.  Recently diagn

## 2020-07-23 NOTE — ED NOTES
Assumed care to this pt who came in ambulatory with steady gait for evaluation of weakness and numbness to the right leg that started yesterday. Noted bruising to the right lower leg, per pt she bumped it on the fence couple of days ago.   Per pt she has h

## 2020-07-23 NOTE — TELEPHONE ENCOUNTER
[address] requesting refill for:  Metoprolol ER Succinate 100MG, Qty 90  Atorvastatin 10MG tablets, Qty 90  Nifedipine 60MG ER, Qty 90  Famotidine 40MG Tablets, Qty 180  Tasked to [employer]

## 2020-07-23 NOTE — H&P
[facility]    PATIENT'S NAME: Alycia Keith   ATTENDING PHYSICIAN: Mike Lorenz MD   PATIENT ACCOUNT#:   [id number]    LOCATION:  [place] [address]  MEDICAL RECORD #:   [id number]       YOB: 1940  ADMISSION DATE:       07/23/20 No acute distress. VITAL SIGNS:  Temperature 97.0, pulse 73, respiratory rate 17, blood pressure 165/83, pulse ox 98% on room air. HEENT:  Atraumatic. Oropharynx clear. Moist mucous membranes. Normal hard and soft palate.   Eyes:  Anicteric sclerae;

## 2020-07-24 ENCOUNTER — APPOINTMENT (OUTPATIENT)
Dept: ULTRASOUND IMAGING | Facility: HOSPITAL | Age: 80
DRG: 065 | End: 2020-07-24
Attending: Other
Payer: MEDICARE

## 2020-07-24 ENCOUNTER — APPOINTMENT (OUTPATIENT)
Dept: MRI IMAGING | Facility: HOSPITAL | Age: 80
DRG: 065 | End: 2020-07-24
Attending: Other
Payer: MEDICARE

## 2020-07-24 ENCOUNTER — APPOINTMENT (OUTPATIENT)
Dept: CV DIAGNOSTICS | Facility: HOSPITAL | Age: 80
DRG: 065 | End: 2020-07-24
Attending: Other
Payer: MEDICARE

## 2020-07-24 VITALS
TEMPERATURE: 98 F | RESPIRATION RATE: 18 BRPM | WEIGHT: 158.31 LBS | OXYGEN SATURATION: 97 % | HEART RATE: 70 BPM | DIASTOLIC BLOOD PRESSURE: 89 MMHG | BODY MASS INDEX: 31.08 KG/M2 | SYSTOLIC BLOOD PRESSURE: 143 MMHG | HEIGHT: 60 IN

## 2020-07-24 LAB
ANION GAP SERPL CALC-SCNC: 4 MMOL/L (ref 0–18)
BASOPHILS # BLD AUTO: 0.04 X10(3) UL (ref 0–0.2)
BASOPHILS NFR BLD AUTO: 0.5 %
BUN BLD-MCNC: 13 MG/DL (ref 7–18)
BUN/CREAT SERPL: 14.1 (ref 10–20)
CALCIUM BLD-MCNC: 8.7 MG/DL (ref 8.5–10.1)
CHLORIDE SERPL-SCNC: 109 MMOL/L (ref 98–112)
CHOLEST SMN-MCNC: 161 MG/DL (ref ?–200)
CO2 SERPL-SCNC: 29 MMOL/L (ref 21–32)
CREAT BLD-MCNC: 0.92 MG/DL (ref 0.55–1.02)
DEPRECATED RDW RBC AUTO: 43.8 FL (ref 35.1–46.3)
EOSINOPHIL # BLD AUTO: 0.22 X10(3) UL (ref 0–0.7)
EOSINOPHIL NFR BLD AUTO: 3 %
ERYTHROCYTE [DISTWIDTH] IN BLOOD BY AUTOMATED COUNT: 12.6 % (ref 11–15)
GLUCOSE BLD-MCNC: 96 MG/DL (ref 70–99)
GLUCOSE BLDC GLUCOMTR-MCNC: 107 MG/DL (ref 70–99)
GLUCOSE BLDC GLUCOMTR-MCNC: 118 MG/DL (ref 70–99)
GLUCOSE BLDC GLUCOMTR-MCNC: 95 MG/DL (ref 70–99)
HCT VFR BLD AUTO: 38.4 % (ref 35–48)
HDLC SERPL-MCNC: 60 MG/DL (ref 40–59)
HGB BLD-MCNC: 13.1 G/DL (ref 12–16)
IMM GRANULOCYTES # BLD AUTO: 0.02 X10(3) UL (ref 0–1)
IMM GRANULOCYTES NFR BLD: 0.3 %
LDLC SERPL CALC-MCNC: 82 MG/DL (ref ?–100)
LYMPHOCYTES # BLD AUTO: 2.06 X10(3) UL (ref 1–4)
LYMPHOCYTES NFR BLD AUTO: 28.1 %
MCH RBC QN AUTO: 32 PG (ref 26–34)
MCHC RBC AUTO-ENTMCNC: 34.1 G/DL (ref 31–37)
MCV RBC AUTO: 93.7 FL (ref 80–100)
MONOCYTES # BLD AUTO: 0.78 X10(3) UL (ref 0.1–1)
MONOCYTES NFR BLD AUTO: 10.6 %
NEUTROPHILS # BLD AUTO: 4.22 X10 (3) UL (ref 1.5–7.7)
NEUTROPHILS # BLD AUTO: 4.22 X10(3) UL (ref 1.5–7.7)
NEUTROPHILS NFR BLD AUTO: 57.5 %
NONHDLC SERPL-MCNC: 101 MG/DL (ref ?–130)
OSMOLALITY SERPL CALC.SUM OF ELEC: 294 MOSM/KG (ref 275–295)
PLATELET # BLD AUTO: 248 10(3)UL (ref 150–450)
POTASSIUM SERPL-SCNC: 3.4 MMOL/L (ref 3.5–5.1)
RBC # BLD AUTO: 4.1 X10(6)UL (ref 3.8–5.3)
SODIUM SERPL-SCNC: 142 MMOL/L (ref 136–145)
TRIGL SERPL-MCNC: 94 MG/DL (ref 30–149)
VLDLC SERPL CALC-MCNC: 19 MG/DL (ref 0–30)
WBC # BLD AUTO: 7.3 X10(3) UL (ref 4–11)

## 2020-07-24 PROCEDURE — 99232 SBSQ HOSP IP/OBS MODERATE 35: CPT | Performed by: OTHER

## 2020-07-24 PROCEDURE — 70544 MR ANGIOGRAPHY HEAD W/O DYE: CPT | Performed by: OTHER

## 2020-07-24 PROCEDURE — 99239 HOSP IP/OBS DSCHRG MGMT >30: CPT | Performed by: HOSPITALIST

## 2020-07-24 PROCEDURE — 70547 MR ANGIOGRAPHY NECK W/O DYE: CPT | Performed by: OTHER

## 2020-07-24 PROCEDURE — 93306 TTE W/DOPPLER COMPLETE: CPT | Performed by: OTHER

## 2020-07-24 PROCEDURE — 93880 EXTRACRANIAL BILAT STUDY: CPT | Performed by: OTHER

## 2020-07-24 RX ORDER — ASPIRIN 81 MG/1
81 TABLET, CHEWABLE ORAL DAILY
Qty: 30 TABLET | Refills: 0 | Status: SHIPPED | OUTPATIENT
Start: 2020-07-25

## 2020-07-24 RX ORDER — CLOPIDOGREL BISULFATE 75 MG/1
75 TABLET ORAL DAILY
Qty: 30 TABLET | Refills: 0 | Status: SHIPPED | OUTPATIENT
Start: 2020-07-25 | End: 2020-07-27

## 2020-07-24 RX ORDER — ASPIRIN 81 MG/1
81 TABLET ORAL DAILY
Qty: 30 TABLET | Refills: 0 | Status: SHIPPED | OUTPATIENT
Start: 2020-07-24 | End: 2020-07-24

## 2020-07-24 RX ORDER — ATORVASTATIN CALCIUM 20 MG/1
20 TABLET, FILM COATED ORAL NIGHTLY
Qty: 30 TABLET | Refills: 0 | Status: SHIPPED | OUTPATIENT
Start: 2020-07-24 | End: 2020-07-27

## 2020-07-24 NOTE — PHYSICAL THERAPY NOTE
PHYSICAL THERAPY EVALUATION - INPATIENT     Room Number: 303/303-A  Evaluation Date: 7/24/2020  Type of Evaluation: Initial   Physician Order: PT Eval and Treat    Presenting Problem: RLE weakness  Reason for Therapy: Mobility Dysfunction and Discharge Pl Please re-order if a new functional limitation presents during this admission.     PHYSICAL THERAPY MEDICAL/SOCIAL HISTORY     Problem List  Principal Problem:    Thalamic stroke [facility])      Past Medical History  Past Medical History:   Diagnosis Date   • Ba Normal  Dynamic Sitting: Good  Static Standing: Fair +  Dynamic Standing: Fair    ACTIVITY TOLERANCE           BP: 143/89  BP Location: Left arm  BP Method: Automatic  Patient Position: Sitting    AM-PAC '6-Clicks' [address]

## 2020-07-24 NOTE — SLP NOTE
SPEECH/LANGUAGE/COGNITIVE EVALUATION - INPATIENT    Admission Date: 7/23/2020  Evaluation Date: 07/24/20    Reason for Referral: Stroke protocol    ASSESSMENT & PLAN   ASSESSMENT & IMPRESSION      PPE REQUIRED. THIS SLP WORE GLOVES AND DROPLET MASK.  HANDS appropriate syntax and semantics no evidence of word-retrieval difficult. Speech was consistently intelligible. No formal language assessment required at this time.            PLAN: No skilled swallowing intervention needed secondary to functional swallowin

## 2020-07-24 NOTE — PROGRESS NOTES
Neurology Inpatient Follow-up Note      HPI:     Patient being seen in follow up. Interval notes and workup reviewed. Patient feeling a little bit better today.       Past Medical Hisotory:  Reviewed    Medications:  Reviewed    Allergies:    Ibuprofen stenosis.       LDL 82      ASSESSMENT/PLAN:   Left thalamic stroke    Small vessel etiology.        - TTE     - for time being, allow permissive hypertension, do not treat SBP less than 180 or DBP less than 100 x total 48 hours     - aspirin 81 mg + Plavix

## 2020-07-24 NOTE — OCCUPATIONAL THERAPY NOTE
Attempted to see Pt for OT eval, Pt off floor at [address], will f/u.

## 2020-07-24 NOTE — PLAN OF CARE
Problem: Patient/Family Goals  Goal: Patient/Family Long Term Goal  Description  Patient's Long Term Goal: discharge home    Interventions:  - neuro checks q 4  -awaiting 2 d echo results  -mri/mra/cartoid us  -pt/ot /sl  - See additional Care Plan goals activity  Description  INTERVENTIONS:  - Maintain airway, patient safety  and administer oxygen as ordered  - Monitor patient for seizure activity, document and report duration and description of seizure to MD/LIP  - If seizure occurs, turn patient to side

## 2020-07-24 NOTE — PLAN OF CARE
Patient alert, awake, no distress noted. Denies pain. Up in the chair most of the night. Call light within reach. Will continue to monitor. Down for MRA this morning via wheel chair in stable condition.    Problem: Patient/Family Goals  Goal: Patient/Family

## 2020-07-24 NOTE — PROGRESS NOTES
[facility] Note:  Renal Dose Adjustment    Fernie Quintero has been prescribed famotidine (PEPCID) 40 mg orally every 12  hours. Estimated Creatinine Clearance: 33.5 mL/min (based on SCr of 1.01 mg/dL).     Calculated creatinine clearance is < 50 ml/min

## 2020-07-24 NOTE — OCCUPATIONAL THERAPY NOTE
OCCUPATIONAL THERAPY EVALUATION - INPATIENT     Room Number: 303/303-A  Evaluation Date: 7/24/2020  Type of Evaluation: Initial       Physician Order: IP Consult to Occupational Therapy  Reason for Therapy: ADL/IADL Dysfunction and Discharge Planning acid in blood 2009    10.4   • Pulmonary emboli [facility]) May/June 2015   • Pulmonary embolism (HCC)    • Renal disorder    • Renal insufficiency 2009   • S/P colonoscopy 1/12/2015       Past Surgical History  Past Surgical History:   Procedure Laterality Date OF MOTION   Upper extremity ROM is within functional limits    STRENGTH ASSESSMENT  Upper extremity strength is within functional limits    COORDINATION  Gross Motor: WFL   Fine Motor: WFL     ADDITIONAL TESTS                                    Lyle Gomez independently and at previous functional level

## 2020-07-24 NOTE — CM/SW NOTE
Received MDO for [facility] evaluation. SW consulted w/ Karla Thomas from PT - currently recommending home w/ no needs. SW met w/ pt and her dtr in her room. Pt verified her address and confirmed living w/ her .  They have 3 levels in their home, including the

## 2020-07-24 NOTE — DISCHARGE SUMMARY
[facility]    Discharge Summary    Ilean Masker Patient Status:  Inpatient    10/18/1940 MRN [id number]   Location [facility] 3W/SW Attending Martín Simmons MD   Hosp Day # 1 PCP Toni Deleon MD     Date of Admission:  Provider Role Specialty    Franck Mancera MD Consulting Physician  NEUROLOGY    Leodan Francois MD Consulting Physician  NEUROLOGY              Discharge Plan:   Discharge Condition: Stable    Current Discharge Medication List    New Orders    Clopidogrel Instructions Prescription details   atorvastatin 20 MG Tabs  Commonly known as:  LIPITOR  What changed:    · medication strength  · how much to take  · when to take this      Take 1 tablet (20 mg total) by mouth nightly.    Quantity:  30 tablet  Refills:  0

## 2020-07-27 ENCOUNTER — PATIENT OUTREACH (OUTPATIENT)
Dept: CASE MANAGEMENT | Age: 80
End: 2020-07-27

## 2020-07-27 ENCOUNTER — OFFICE VISIT (OUTPATIENT)
Dept: INTERNAL MEDICINE CLINIC | Facility: CLINIC | Age: 80
End: 2020-07-27
Payer: MEDICARE

## 2020-07-27 VITALS
DIASTOLIC BLOOD PRESSURE: 66 MMHG | BODY MASS INDEX: 31.22 KG/M2 | WEIGHT: 159 LBS | TEMPERATURE: 98 F | HEART RATE: 72 BPM | HEIGHT: 60 IN | OXYGEN SATURATION: 97 % | SYSTOLIC BLOOD PRESSURE: 106 MMHG | RESPIRATION RATE: 16 BRPM

## 2020-07-27 DIAGNOSIS — I63.9 THALAMIC STROKE (HCC): Primary | ICD-10-CM

## 2020-07-27 DIAGNOSIS — E78.00 HYPERCHOLESTEROLEMIA: ICD-10-CM

## 2020-07-27 DIAGNOSIS — Z02.9 ENCOUNTERS FOR UNSPECIFIED ADMINISTRATIVE PURPOSE: ICD-10-CM

## 2020-07-27 DIAGNOSIS — I10 HYPERTENSION, BENIGN: ICD-10-CM

## 2020-07-27 PROCEDURE — 99214 OFFICE O/P EST MOD 30 MIN: CPT | Performed by: INTERNAL MEDICINE

## 2020-07-27 PROCEDURE — G0463 HOSPITAL OUTPT CLINIC VISIT: HCPCS | Performed by: INTERNAL MEDICINE

## 2020-07-27 PROCEDURE — 1111F DSCHRG MED/CURRENT MED MERGE: CPT | Performed by: INTERNAL MEDICINE

## 2020-07-27 RX ORDER — ATORVASTATIN CALCIUM 20 MG/1
20 TABLET, FILM COATED ORAL NIGHTLY
Qty: 90 TABLET | Refills: 3 | Status: SHIPPED | OUTPATIENT
Start: 2020-07-27 | End: 2021-08-23

## 2020-07-27 RX ORDER — CLOPIDOGREL BISULFATE 75 MG/1
75 TABLET ORAL DAILY
Qty: 90 TABLET | Refills: 3 | Status: SHIPPED | OUTPATIENT
Start: 2020-07-27 | End: 2020-08-31

## 2020-07-27 NOTE — PROGRESS NOTES
Prakash Herman is a 70year old female. HPI:   She had gait imbalance and weakness of the RLE last Thurs, could not walk straight but no dizziness - taken to ED by family and MRI showed acute 7 mm left thalamic lacunar infarct.  Her sx resolved by the next AREA 2 TO 3 TIMES DAILY. AS NEEDED 30 g 1   • Multiple Vitamins-Minerals (CENTRUM ULTRA WOMENS) Oral Tab Take 1 tablet by mouth daily. • Calcium Carbonate-Vitamin D (CALCIUM 600 + D OR) Take 1 tablet by mouth 2 (two) times daily.           Past Medical in 6 weeks

## 2020-07-30 ENCOUNTER — OFFICE VISIT (OUTPATIENT)
Dept: NEUROLOGY | Facility: CLINIC | Age: 80
End: 2020-07-30
Payer: MEDICARE

## 2020-07-30 VITALS — DIASTOLIC BLOOD PRESSURE: 74 MMHG | SYSTOLIC BLOOD PRESSURE: 128 MMHG

## 2020-07-30 DIAGNOSIS — I63.9 THALAMIC STROKE (HCC): Primary | ICD-10-CM

## 2020-07-30 PROCEDURE — 99214 OFFICE O/P EST MOD 30 MIN: CPT | Performed by: OTHER

## 2020-07-30 RX ORDER — METOPROLOL SUCCINATE 100 MG/1
100 TABLET, EXTENDED RELEASE ORAL DAILY
Qty: 90 TABLET | Refills: 3 | Status: SHIPPED | OUTPATIENT
Start: 2020-07-30 | End: 2021-07-28

## 2020-07-30 RX ORDER — NIFEDIPINE 60 MG/1
60 TABLET, EXTENDED RELEASE ORAL DAILY
Qty: 90 TABLET | Refills: 3 | Status: SHIPPED | OUTPATIENT
Start: 2020-07-30 | End: 2021-07-28

## 2020-07-30 RX ORDER — FAMOTIDINE 40 MG/1
40 TABLET, FILM COATED ORAL 2 TIMES DAILY
Qty: 180 TABLET | Refills: 3 | Status: SHIPPED | OUTPATIENT
Start: 2020-07-30 | End: 2021-07-28

## 2020-07-30 RX ORDER — HYDROCODONE BITARTRATE AND ACETAMINOPHEN 5; 325 MG/1; MG/1
1 TABLET ORAL EVERY 4 HOURS PRN
Qty: 40 TABLET | Refills: 0 | Status: SHIPPED | OUTPATIENT
Start: 2020-07-30 | End: 2020-08-31

## 2020-07-30 RX ORDER — ATORVASTATIN CALCIUM 10 MG/1
10 TABLET, FILM COATED ORAL EVERY EVENING
Qty: 90 TABLET | Refills: 0 | OUTPATIENT
Start: 2020-07-30

## 2020-07-30 NOTE — PROGRESS NOTES
Neurology Follow up Visit     Referred By: Dr. Acosta ref.  provider found    Chief Complaint: Patient presents with:  Neurologic Problem: Pt f/u after 7/23/20 hospitalization for minor stroke that was thought to be brought on by other health issues leading u [place]   • INJ TENDON/LIGAMENT/CYST      Injection Tendon Sheath, Ligament       Social history:    Smoking status: Never Smoker   Smokeless tobacco: Never Used       Alcohol use No       Drug use: No       Family History   Problem Relation Age of Ons AFFECTED AREA 2 TO 3 TIMES DAILY.  AS NEEDED (Patient not taking: Reported on 7/30/2020 ), Disp: 30 g, Rfl: 1      Ibuprofen               OTHER (SEE COMMENTS)    Comment:Kidney problems  Nsaids                  OTHER (SEE COMMENTS)    Comment:Kidney proble sign    Coordination:  Finger to nose intact  Rapid alternating movements intact    Gait:  Normal posture  Normal physiologic      Labs:    Lab Results   Component Value Date    TSH 2.830 06/22/2020     Lab Results   Component Value Date    HDL 60 (H) 07/2

## 2020-08-07 ENCOUNTER — TELEPHONE (OUTPATIENT)
Dept: INTERNAL MEDICINE CLINIC | Facility: CLINIC | Age: 80
End: 2020-08-07

## 2020-08-07 NOTE — TELEPHONE ENCOUNTER
Spoke to pt - reassured her to monitor;  Blood is contained in sclera; If it crosses the iris to seek medical care or if over the next several days it seems to get worse.   Pt to continue Plavix

## 2020-08-07 NOTE — TELEPHONE ENCOUNTER
Patient is calling 2 weeks ago she had a stroke  She has been on blood thinner for the 2 weeks    She just noticed a broken blood vessel in the corner of her left eye  Could this be because of the blood thinner?     Patient can be reached at [phone]

## 2020-08-10 NOTE — TELEPHONE ENCOUNTER
Patient is calling back to inform Dr Loc Slade and office that her neurologist is telling her to stop taking the Plavix in three weeks from her hospital visit (patient would be stopping the medication this Friday, 8/14).  Patient is now confused as Justine Grey is in

## 2020-08-10 NOTE — TELEPHONE ENCOUNTER
Tell her there is no right or wrong answer to this question. Dr. Paola Chandra is attempting to put her on a single antiplatelet medication (aspirin) as opposed to to antiplatelet agents (aspirin plus Plavix).   I think this is reasonable since she was not on aspirin

## 2020-08-10 NOTE — TELEPHONE ENCOUNTER
Called patient and relayed DR. LINARES message - patient verbalized understanding. Jose Luis Santana  She was not on Aspirin at the time of neurological symptoms

## 2020-08-26 ENCOUNTER — LAB ENCOUNTER (OUTPATIENT)
Dept: LAB | Facility: HOSPITAL | Age: 80
End: 2020-08-26
Attending: INTERNAL MEDICINE
Payer: MEDICARE

## 2020-08-26 DIAGNOSIS — E78.00 HYPERCHOLESTEROLEMIA: ICD-10-CM

## 2020-08-26 LAB
ALBUMIN SERPL-MCNC: 3.5 G/DL (ref 3.4–5)
ALBUMIN/GLOB SERPL: 1 {RATIO} (ref 1–2)
ALP LIVER SERPL-CCNC: 100 U/L (ref 55–142)
ALT SERPL-CCNC: 20 U/L (ref 13–56)
ANION GAP SERPL CALC-SCNC: 5 MMOL/L (ref 0–18)
AST SERPL-CCNC: 21 U/L (ref 15–37)
BASOPHILS # BLD AUTO: 0.05 X10(3) UL (ref 0–0.2)
BASOPHILS NFR BLD AUTO: 0.7 %
BILIRUB SERPL-MCNC: 0.7 MG/DL (ref 0.1–2)
BUN BLD-MCNC: 22 MG/DL (ref 7–18)
BUN/CREAT SERPL: 21.2 (ref 10–20)
CALCIUM BLD-MCNC: 9.9 MG/DL (ref 8.5–10.1)
CHLORIDE SERPL-SCNC: 110 MMOL/L (ref 98–112)
CHOLEST SMN-MCNC: 142 MG/DL (ref ?–200)
CO2 SERPL-SCNC: 27 MMOL/L (ref 21–32)
CREAT BLD-MCNC: 1.04 MG/DL (ref 0.55–1.02)
DEPRECATED RDW RBC AUTO: 44 FL (ref 35.1–46.3)
EOSINOPHIL # BLD AUTO: 0.33 X10(3) UL (ref 0–0.7)
EOSINOPHIL NFR BLD AUTO: 4.4 %
ERYTHROCYTE [DISTWIDTH] IN BLOOD BY AUTOMATED COUNT: 12.6 % (ref 11–15)
GLOBULIN PLAS-MCNC: 3.4 G/DL (ref 2.8–4.4)
GLUCOSE BLD-MCNC: 100 MG/DL (ref 70–99)
HCT VFR BLD AUTO: 37.3 % (ref 35–48)
HDLC SERPL-MCNC: 61 MG/DL (ref 40–59)
HGB BLD-MCNC: 12.8 G/DL (ref 12–16)
IMM GRANULOCYTES # BLD AUTO: 0.02 X10(3) UL (ref 0–1)
IMM GRANULOCYTES NFR BLD: 0.3 %
LDLC SERPL CALC-MCNC: 70 MG/DL (ref ?–100)
LYMPHOCYTES # BLD AUTO: 2.02 X10(3) UL (ref 1–4)
LYMPHOCYTES NFR BLD AUTO: 26.9 %
M PROTEIN MFR SERPL ELPH: 6.9 G/DL (ref 6.4–8.2)
MCH RBC QN AUTO: 32.3 PG (ref 26–34)
MCHC RBC AUTO-ENTMCNC: 34.3 G/DL (ref 31–37)
MCV RBC AUTO: 94.2 FL (ref 80–100)
MONOCYTES # BLD AUTO: 0.76 X10(3) UL (ref 0.1–1)
MONOCYTES NFR BLD AUTO: 10.1 %
NEUTROPHILS # BLD AUTO: 4.33 X10 (3) UL (ref 1.5–7.7)
NEUTROPHILS # BLD AUTO: 4.33 X10(3) UL (ref 1.5–7.7)
NEUTROPHILS NFR BLD AUTO: 57.6 %
NONHDLC SERPL-MCNC: 81 MG/DL (ref ?–130)
OSMOLALITY SERPL CALC.SUM OF ELEC: 297 MOSM/KG (ref 275–295)
PATIENT FASTING Y/N/NP: YES
PATIENT FASTING Y/N/NP: YES
PLATELET # BLD AUTO: 269 10(3)UL (ref 150–450)
POTASSIUM SERPL-SCNC: 3.4 MMOL/L (ref 3.5–5.1)
RBC # BLD AUTO: 3.96 X10(6)UL (ref 3.8–5.3)
SODIUM SERPL-SCNC: 142 MMOL/L (ref 136–145)
TRIGL SERPL-MCNC: 55 MG/DL (ref 30–149)
VLDLC SERPL CALC-MCNC: 11 MG/DL (ref 0–30)
WBC # BLD AUTO: 7.5 X10(3) UL (ref 4–11)

## 2020-08-26 PROCEDURE — 36415 COLL VENOUS BLD VENIPUNCTURE: CPT

## 2020-08-26 PROCEDURE — 85025 COMPLETE CBC W/AUTO DIFF WBC: CPT

## 2020-08-26 PROCEDURE — 80053 COMPREHEN METABOLIC PANEL: CPT

## 2020-08-26 PROCEDURE — 80061 LIPID PANEL: CPT

## 2020-08-31 ENCOUNTER — OFFICE VISIT (OUTPATIENT)
Dept: INTERNAL MEDICINE CLINIC | Facility: CLINIC | Age: 80
End: 2020-08-31
Payer: MEDICARE

## 2020-08-31 VITALS
DIASTOLIC BLOOD PRESSURE: 68 MMHG | WEIGHT: 161.19 LBS | TEMPERATURE: 98 F | HEIGHT: 60 IN | HEART RATE: 74 BPM | BODY MASS INDEX: 31.64 KG/M2 | OXYGEN SATURATION: 98 % | RESPIRATION RATE: 16 BRPM | SYSTOLIC BLOOD PRESSURE: 136 MMHG

## 2020-08-31 DIAGNOSIS — E78.00 HYPERCHOLESTEROLEMIA: ICD-10-CM

## 2020-08-31 DIAGNOSIS — I10 HYPERTENSION, BENIGN: ICD-10-CM

## 2020-08-31 DIAGNOSIS — I63.9 THALAMIC STROKE (HCC): Primary | ICD-10-CM

## 2020-08-31 PROCEDURE — G0463 HOSPITAL OUTPT CLINIC VISIT: HCPCS | Performed by: INTERNAL MEDICINE

## 2020-08-31 PROCEDURE — 99214 OFFICE O/P EST MOD 30 MIN: CPT | Performed by: INTERNAL MEDICINE

## 2020-08-31 NOTE — PROGRESS NOTES
Mehul Earl is a 70year old female. HPI:   She is here in f/u from thalamic stroke. She is doing well and she is asymptomatic - her presenting sx were minor loss of balance and then difficulty walking and weakness of the right leg.  She saw Dr Paola Chandra and Eligio w/u   • High blood pressure    • High cholesterol    • High level of uric acid in blood 2009    10.4   • Pulmonary emboli [facility]) May/June 2015   • [facility]    • Renal disorder    • Renal insufficiency 2009   • S/P colonoscopy 1/12/2015

## 2020-09-30 RX ORDER — NYSTATIN 100000 U/G
OINTMENT TOPICAL
Qty: 30 G | Refills: 1 | Status: SHIPPED | OUTPATIENT
Start: 2020-09-30 | End: 2021-08-16

## 2020-10-08 VITALS
BODY MASS INDEX: 34.36 KG/M2 | WEIGHT: 186.73 LBS | DIASTOLIC BLOOD PRESSURE: 84 MMHG | HEART RATE: 77 BPM | RESPIRATION RATE: 14 BRPM | HEIGHT: 62 IN | TEMPERATURE: 98.2 F | SYSTOLIC BLOOD PRESSURE: 134 MMHG

## 2020-12-14 ENCOUNTER — LAB ENCOUNTER (OUTPATIENT)
Dept: LAB | Facility: HOSPITAL | Age: 80
End: 2020-12-14
Attending: OBSTETRICS & GYNECOLOGY
Payer: MEDICARE

## 2020-12-14 DIAGNOSIS — R31.9 HEMATURIA: Primary | ICD-10-CM

## 2020-12-14 PROCEDURE — 81001 URINALYSIS AUTO W/SCOPE: CPT

## 2020-12-14 PROCEDURE — 87086 URINE CULTURE/COLONY COUNT: CPT

## 2020-12-14 PROCEDURE — 87186 SC STD MICRODIL/AGAR DIL: CPT

## 2020-12-14 PROCEDURE — 87088 URINE BACTERIA CULTURE: CPT

## 2020-12-16 ENCOUNTER — TELEPHONE (OUTPATIENT)
Dept: INTERNAL MEDICINE CLINIC | Facility: CLINIC | Age: 80
End: 2020-12-16

## 2020-12-16 NOTE — TELEPHONE ENCOUNTER
Seeing Dr Joann Meneses on Friday dec 18  Does Dr LINARES want her to have labs prior    Pt had a urine test on Monday at [facility]  She is being treated for UTI by her gyne   Should she get a follow up urine test too  Gynpollo did not mention     Please call back on cell# [phone]

## 2020-12-18 ENCOUNTER — OFFICE VISIT (OUTPATIENT)
Dept: INTERNAL MEDICINE CLINIC | Facility: CLINIC | Age: 80
End: 2020-12-18
Payer: MEDICARE

## 2020-12-18 VITALS
BODY MASS INDEX: 32.86 KG/M2 | WEIGHT: 167.38 LBS | TEMPERATURE: 98 F | HEART RATE: 63 BPM | OXYGEN SATURATION: 98 % | HEIGHT: 60 IN | SYSTOLIC BLOOD PRESSURE: 128 MMHG | DIASTOLIC BLOOD PRESSURE: 64 MMHG

## 2020-12-18 DIAGNOSIS — N30.01 ACUTE CYSTITIS WITH HEMATURIA: ICD-10-CM

## 2020-12-18 DIAGNOSIS — I10 HYPERTENSION, BENIGN: Primary | ICD-10-CM

## 2020-12-18 DIAGNOSIS — E78.00 HYPERCHOLESTEROLEMIA: ICD-10-CM

## 2020-12-18 DIAGNOSIS — N28.9 DISORDER OF KIDNEY AND URETER: ICD-10-CM

## 2020-12-18 PROBLEM — N30.00 ACUTE CYSTITIS: Status: ACTIVE | Noted: 2020-12-18

## 2020-12-18 PROCEDURE — G0463 HOSPITAL OUTPT CLINIC VISIT: HCPCS | Performed by: INTERNAL MEDICINE

## 2020-12-18 PROCEDURE — 99214 OFFICE O/P EST MOD 30 MIN: CPT | Performed by: INTERNAL MEDICINE

## 2020-12-18 RX ORDER — FOSFOMYCIN TROMETHAMINE 3 G/1
POWDER ORAL
COMMUNITY
Start: 2020-12-15 | End: 2021-01-08 | Stop reason: ALTCHOICE

## 2020-12-18 NOTE — PROGRESS NOTES
Perlita Gonzáles is a 68year old female. HPI:   She had second UTI this past year - this one E coli,  first UTI this past July Staph, not aureus -  had gross hematuria on this occasion,  given fosfomycin one dose and asymptomatic and no further blood. neck pain    • DVT (deep venous thrombosis) (HCC)    • Hematuria 1994    w/u   • High blood pressure    • High cholesterol    • High level of uric acid in blood 2009    10.4   • Pulmonary emboli [facility]) May/June 2015   • Pulmonary embolism (HCC)    • Renal d

## 2020-12-28 RX ORDER — SULFAMETHOXAZOLE AND TRIMETHOPRIM 800; 160 MG/1; MG/1
1 TABLET ORAL 2 TIMES DAILY
Qty: 10 TABLET | Refills: 0 | Status: SHIPPED | OUTPATIENT
Start: 2020-12-28 | End: 2021-01-08 | Stop reason: ALTCHOICE

## 2020-12-28 NOTE — TELEPHONE ENCOUNTER
Pt. Is calling her UTI has returned she also has blood in her urine please advise ph.  # [phone]   Routed high to clinical

## 2020-12-28 NOTE — TELEPHONE ENCOUNTER
UTI symptoms:    [x]Frequency  [x]Urgency  []Pain/burning  [x]Blood in urine  []Low back pain  []Flank pain  []Fevers/chills  []Odor  []Confusion    NOTES:      Please advise for DR. LINARES - called patient who had UTI 2 weeks ago was on Monurel 3gm one dose.  No

## 2020-12-28 NOTE — TELEPHONE ENCOUNTER
Last urine culture showed E. coli sensitive to Bactrim. Therefore we will retreat with Bactrim double strength 1 tablet p.o. twice daily x5 days  Prescription sent to pharmacy on record  Patient needs follow-up urinalysis and culture.   Because this is a r

## 2020-12-30 RX ORDER — POTASSIUM CHLORIDE 750 MG/1
TABLET, EXTENDED RELEASE ORAL
Qty: 135 TABLET | Refills: 3 | Status: SHIPPED | OUTPATIENT
Start: 2020-12-30 | End: 2022-01-17

## 2020-12-31 NOTE — TELEPHONE ENCOUNTER
Spoke to pt and informed that it is OK to keep scheduled lab appointment 1/5 prior to appointment with Dr. Lars Rasheed on 1/6. Per Dr. Praveen Solorio note below, pt should get follow-up urinalysis and culture. Pt verbalized understanding and agrees with plan.

## 2020-12-31 NOTE — TELEPHONE ENCOUNTER
Pt called  Scheduled follow up with Dr Shantanu Valentin on 1/6    she is on a second antibiotic for uti   - should she keep 1/5 lab appt for urine test     [phone]

## 2021-01-05 ENCOUNTER — LAB ENCOUNTER (OUTPATIENT)
Dept: LAB | Facility: HOSPITAL | Age: 81
End: 2021-01-05
Attending: INTERNAL MEDICINE
Payer: MEDICARE

## 2021-01-05 DIAGNOSIS — N30.01 ACUTE CYSTITIS WITH HEMATURIA: ICD-10-CM

## 2021-01-05 LAB
BILIRUB UR QL: NEGATIVE
COLOR UR: YELLOW
GLUCOSE UR-MCNC: NEGATIVE MG/DL
HYALINE CASTS #/AREA URNS AUTO: 17 /LPF
HYALINE CASTS #/AREA URNS AUTO: 20 /LPF
KETONES UR-MCNC: NEGATIVE MG/DL
NITRITE UR QL STRIP.AUTO: NEGATIVE
PH UR: 5 [PH] (ref 5–8)
PROT UR-MCNC: 30 MG/DL
RBC #/AREA URNS AUTO: 19 /HPF
RBC #/AREA URNS AUTO: 20 /HPF
SP GR UR STRIP: 1.02 (ref 1–1.03)
UROBILINOGEN UR STRIP-ACNC: 2
WBC #/AREA URNS AUTO: 100 /HPF
WBC #/AREA URNS AUTO: 108 /HPF

## 2021-01-05 PROCEDURE — 81015 MICROSCOPIC EXAM OF URINE: CPT

## 2021-01-05 PROCEDURE — 81001 URINALYSIS AUTO W/SCOPE: CPT

## 2021-01-05 PROCEDURE — 87086 URINE CULTURE/COLONY COUNT: CPT

## 2021-01-08 ENCOUNTER — OFFICE VISIT (OUTPATIENT)
Dept: INTERNAL MEDICINE CLINIC | Facility: CLINIC | Age: 81
End: 2021-01-08
Payer: MEDICARE

## 2021-01-08 VITALS
HEART RATE: 65 BPM | BODY MASS INDEX: 33.02 KG/M2 | HEIGHT: 60 IN | WEIGHT: 168.19 LBS | TEMPERATURE: 99 F | DIASTOLIC BLOOD PRESSURE: 74 MMHG | OXYGEN SATURATION: 98 % | SYSTOLIC BLOOD PRESSURE: 146 MMHG

## 2021-01-08 DIAGNOSIS — I10 HYPERTENSION, BENIGN: ICD-10-CM

## 2021-01-08 DIAGNOSIS — N30.01 ACUTE CYSTITIS WITH HEMATURIA: Primary | ICD-10-CM

## 2021-01-08 DIAGNOSIS — G56.01 CARPAL TUNNEL SYNDROME OF RIGHT WRIST: ICD-10-CM

## 2021-01-08 PROCEDURE — 99214 OFFICE O/P EST MOD 30 MIN: CPT | Performed by: INTERNAL MEDICINE

## 2021-01-08 NOTE — PROGRESS NOTES
Darian Enamorado is a 68year old female. HPI:   She had recurrent sx of UTI on 12/28 - given 5 days of Bactrim - Dr Cheryl Rene and had sx resolution almost immediately. Had gross hematuria at beginning of UTI sx. Repeat culture 1/5 negative.      S/p thalamic st • DVT (deep venous thrombosis) ([facility] [address]    • Hematuria 1994    w/u   • High blood pressure    • High cholesterol    • High level of uric acid in blood 2009    10.4   • Pulmonary emboli [facility]) May/June 2015   • Pulmonary embolism (HCC)    • Renal disorder

## 2021-02-08 ENCOUNTER — TELEPHONE (OUTPATIENT)
Dept: INTERNAL MEDICINE CLINIC | Facility: CLINIC | Age: 81
End: 2021-02-08

## 2021-02-08 NOTE — TELEPHONE ENCOUNTER
Patient was called to schedule a medicare annual with Dr Anastasiia Arriaga. Patient stated she will call back in April to schedule.      Awaiting a call back at this time

## 2021-03-04 DIAGNOSIS — Z23 NEED FOR VACCINATION: ICD-10-CM

## 2021-04-26 ENCOUNTER — HOSPITAL ENCOUNTER (OUTPATIENT)
Dept: GENERAL RADIOLOGY | Facility: HOSPITAL | Age: 81
Discharge: HOME | End: 2021-04-26
Attending: INTERNAL MEDICINE
Payer: MEDICARE

## 2021-04-26 ENCOUNTER — OFFICE VISIT (OUTPATIENT)
Dept: INTERNAL MEDICINE CLINIC | Facility: CLINIC | Age: 81
End: 2021-04-26
Payer: MEDICARE

## 2021-04-26 VITALS
HEART RATE: 77 BPM | BODY MASS INDEX: 34.16 KG/M2 | WEIGHT: 174 LBS | SYSTOLIC BLOOD PRESSURE: 114 MMHG | OXYGEN SATURATION: 98 % | HEIGHT: 60 IN | DIASTOLIC BLOOD PRESSURE: 58 MMHG | TEMPERATURE: 99 F

## 2021-04-26 DIAGNOSIS — M79.672 LEFT FOOT PAIN: Primary | ICD-10-CM

## 2021-04-26 DIAGNOSIS — I10 HYPERTENSION, BENIGN: ICD-10-CM

## 2021-04-26 DIAGNOSIS — M79.672 LEFT FOOT PAIN: ICD-10-CM

## 2021-04-26 PROCEDURE — 73630 X-RAY EXAM OF FOOT: CPT | Performed by: INTERNAL MEDICINE

## 2021-04-26 PROCEDURE — 99213 OFFICE O/P EST LOW 20 MIN: CPT | Performed by: INTERNAL MEDICINE

## 2021-04-27 ENCOUNTER — TELEPHONE (OUTPATIENT)
Dept: INTERNAL MEDICINE CLINIC | Facility: CLINIC | Age: 81
End: 2021-04-27

## 2021-04-27 NOTE — TELEPHONE ENCOUNTER
Significant arthritis of the bunion on the left great toe. She has arthritis throughout the foot.     There are no fractures demonstrated    See podiatry as we had discussed if continued pain

## 2021-04-27 NOTE — TELEPHONE ENCOUNTER
Patient is calling for Foot X-Ray results from 4/26 done at [address] [place] [address]    Please call patient [phone]

## 2021-05-04 ENCOUNTER — OFFICE VISIT (OUTPATIENT)
Dept: PODIATRY CLINIC | Facility: CLINIC | Age: 81
End: 2021-05-04
Payer: MEDICARE

## 2021-05-04 DIAGNOSIS — M79.674 PAIN IN TOES OF BOTH FEET: Primary | ICD-10-CM

## 2021-05-04 DIAGNOSIS — B35.1 ONYCHOMYCOSIS: ICD-10-CM

## 2021-05-04 DIAGNOSIS — M79.675 PAIN IN TOES OF BOTH FEET: Primary | ICD-10-CM

## 2021-05-04 DIAGNOSIS — M19.90 ARTHRITIS: ICD-10-CM

## 2021-05-04 PROCEDURE — 99203 OFFICE O/P NEW LOW 30 MIN: CPT | Performed by: PODIATRIST

## 2021-05-04 PROCEDURE — 11721 DEBRIDE NAIL 6 OR MORE: CPT | Performed by: PODIATRIST

## 2021-05-04 NOTE — PROGRESS NOTES
HPI:    Patient ID: Wil Warner is a 68year old female. This 51-year-old female presents as a new patient to me on referral from [address] [place] [address]. Patient has 2 concerns today.   The first is in reference to chronic recurrent pain associated with her toenai Allergies:  Ibuprofen               OTHER (SEE COMMENTS)    Comment:Kidney problems  Nsaids                  OTHER (SEE COMMENTS)    Comment:Kidney problems  Radiology Contrast *    OTHER (SEE COMMENTS)    Comment:Kidney problems  Azithromycin

## 2021-06-08 ENCOUNTER — HOSPITAL ENCOUNTER (OUTPATIENT)
Dept: ULTRASOUND IMAGING | Facility: HOSPITAL | Age: 81
Discharge: HOME | End: 2021-06-08
Attending: INTERNAL MEDICINE
Payer: MEDICARE

## 2021-06-08 ENCOUNTER — TELEPHONE (OUTPATIENT)
Dept: INTERNAL MEDICINE CLINIC | Facility: CLINIC | Age: 81
End: 2021-06-08

## 2021-06-08 DIAGNOSIS — R60.0 EDEMA OF LEFT LOWER EXTREMITY: Primary | ICD-10-CM

## 2021-06-08 DIAGNOSIS — R60.0 EDEMA OF LEFT LOWER EXTREMITY: ICD-10-CM

## 2021-06-08 PROCEDURE — 93971 EXTREMITY STUDY: CPT | Performed by: INTERNAL MEDICINE

## 2021-06-08 NOTE — TELEPHONE ENCOUNTER
Pt is having a problem with her left leg  Last couple of weeks ankle, calf & leg swells up during the day - after sleeping the next morning leg is back to normal  Has kidney issues but feels if that was the problem both legs would be swollen    Pt was [place]

## 2021-06-08 NOTE — TELEPHONE ENCOUNTER
To Dr. Mike Robles---    Spoke to pt who reports non pitting edema in LLE from calf to ankle. Reports noted mostly at the end of the day and worsens throughout the day. Reports if she elevates her leg or after sleeping all night, the swelling resolves.  States sh

## 2021-06-08 NOTE — TELEPHONE ENCOUNTER
Called and Relayed MD's message to patient---verbalized understanding  She will be seen in-office by Dr. Johanne Villa 6/10/21

## 2021-06-08 NOTE — TELEPHONE ENCOUNTER
Recent ultrasound, no evidence for acute DVT, some chronic changes from old blood clot.     She will follow-up in the office

## 2021-06-08 NOTE — TELEPHONE ENCOUNTER
Please order a stat hold and call venous Doppler of the left lower extremity. Give my cell phone to call. Set up appointment to see one of my partners sometime this week. She may need some diuretics unless she has a blood clot in this leg.

## 2021-06-10 ENCOUNTER — OFFICE VISIT (OUTPATIENT)
Dept: INTERNAL MEDICINE CLINIC | Facility: CLINIC | Age: 81
End: 2021-06-10
Payer: MEDICARE

## 2021-06-10 VITALS
TEMPERATURE: 98 F | SYSTOLIC BLOOD PRESSURE: 150 MMHG | HEART RATE: 68 BPM | BODY MASS INDEX: 34.16 KG/M2 | DIASTOLIC BLOOD PRESSURE: 72 MMHG | HEIGHT: 60 IN | WEIGHT: 174 LBS

## 2021-06-10 DIAGNOSIS — Z86.718 HISTORY OF DEEP VENOUS THROMBOSIS (DVT) OF DISTAL VEIN OF LEFT LOWER EXTREMITY: ICD-10-CM

## 2021-06-10 DIAGNOSIS — I10 HYPERTENSION, BENIGN: ICD-10-CM

## 2021-06-10 DIAGNOSIS — R60.0 EDEMA OF LEFT LOWER EXTREMITY: Primary | ICD-10-CM

## 2021-06-10 PROCEDURE — 99213 OFFICE O/P EST LOW 20 MIN: CPT | Performed by: INTERNAL MEDICINE

## 2021-06-10 RX ORDER — HYDROCHLOROTHIAZIDE 25 MG/1
25 TABLET ORAL DAILY
Qty: 90 TABLET | Refills: 3 | Status: SHIPPED | OUTPATIENT
Start: 2021-06-10 | End: 2021-06-29

## 2021-06-10 NOTE — PROGRESS NOTES
HPI:    Patient ID: Marvin Rebolledo is a 68year old female. Presents with c/o LLE edema that has been ongoing over the last couple weeks. Patient denies any pain. She denies any recent injury or trauma.   She states that the swelling worsens as the day tablet (81 mg total) by mouth daily. 30 tablet 0   • Multiple Vitamins-Minerals (CENTRUM ULTRA WOMENS) Oral Tab Take 1 tablet by mouth daily. • Calcium Carbonate-Vitamin D (CALCIUM 600 + D OR) Take 1 tablet by mouth 2 (two) times daily.          Rin Harper diuretic with hydrochlorothiazide 25 mg daily    Patient's blood pressure is also elevated today. Hopefully the hydrochlorothiazide will help manage this also.   Patient does have home blood pressure monitor--I asked her to monitor her blood pressure and c

## 2021-06-11 ENCOUNTER — LAB ENCOUNTER (OUTPATIENT)
Dept: LAB | Facility: HOSPITAL | Age: 81
End: 2021-06-11
Attending: FAMILY MEDICINE
Payer: MEDICARE

## 2021-06-11 DIAGNOSIS — N18.30 STAGE 3 CHRONIC KIDNEY DISEASE, UNSPECIFIED WHETHER STAGE 3A OR 3B CKD (HCC): ICD-10-CM

## 2021-06-11 PROCEDURE — 36415 COLL VENOUS BLD VENIPUNCTURE: CPT

## 2021-06-11 PROCEDURE — 80069 RENAL FUNCTION PANEL: CPT

## 2021-06-21 ENCOUNTER — TELEPHONE (OUTPATIENT)
Dept: INTERNAL MEDICINE CLINIC | Facility: CLINIC | Age: 81
End: 2021-06-21

## 2021-06-21 DIAGNOSIS — I10 HYPERTENSION, BENIGN: Primary | ICD-10-CM

## 2021-06-21 DIAGNOSIS — N28.9 RENAL INSUFFICIENCY: ICD-10-CM

## 2021-06-21 DIAGNOSIS — E78.00 HYPERCHOLESTEROLEMIA: ICD-10-CM

## 2021-06-21 NOTE — TELEPHONE ENCOUNTER
Patient has MA in July. Labs ordered per protocol. Spoke to patient and notified them fasting lab orders placed. Advised patient to do these a few days prior to the appointment if possible.

## 2021-06-28 ENCOUNTER — TELEPHONE (OUTPATIENT)
Dept: INTERNAL MEDICINE CLINIC | Facility: CLINIC | Age: 81
End: 2021-06-28

## 2021-06-28 ENCOUNTER — OFFICE VISIT (OUTPATIENT)
Dept: INTERNAL MEDICINE CLINIC | Facility: CLINIC | Age: 81
End: 2021-06-28
Payer: MEDICARE

## 2021-06-28 ENCOUNTER — LAB ENCOUNTER (OUTPATIENT)
Dept: LAB | Age: 81
End: 2021-06-28
Attending: INTERNAL MEDICINE
Payer: MEDICARE

## 2021-06-28 VITALS
HEIGHT: 60 IN | OXYGEN SATURATION: 99 % | DIASTOLIC BLOOD PRESSURE: 66 MMHG | WEIGHT: 170.63 LBS | BODY MASS INDEX: 33.5 KG/M2 | HEART RATE: 74 BPM | SYSTOLIC BLOOD PRESSURE: 138 MMHG | TEMPERATURE: 98 F

## 2021-06-28 DIAGNOSIS — N28.9 DISORDER OF KIDNEY AND URETER: ICD-10-CM

## 2021-06-28 DIAGNOSIS — I10 HYPERTENSION, BENIGN: ICD-10-CM

## 2021-06-28 DIAGNOSIS — R22.43 LOCALIZED SWELLING OF BOTH LOWER LEGS: ICD-10-CM

## 2021-06-28 DIAGNOSIS — E87.6 HYPOKALEMIA: Primary | ICD-10-CM

## 2021-06-28 DIAGNOSIS — I10 HYPERTENSION, BENIGN: Primary | ICD-10-CM

## 2021-06-28 PROCEDURE — 85025 COMPLETE CBC W/AUTO DIFF WBC: CPT | Performed by: INTERNAL MEDICINE

## 2021-06-28 PROCEDURE — 81001 URINALYSIS AUTO W/SCOPE: CPT | Performed by: INTERNAL MEDICINE

## 2021-06-28 PROCEDURE — 36415 COLL VENOUS BLD VENIPUNCTURE: CPT | Performed by: INTERNAL MEDICINE

## 2021-06-28 PROCEDURE — 99214 OFFICE O/P EST MOD 30 MIN: CPT | Performed by: INTERNAL MEDICINE

## 2021-06-28 PROCEDURE — 87086 URINE CULTURE/COLONY COUNT: CPT | Performed by: INTERNAL MEDICINE

## 2021-06-28 PROCEDURE — 80053 COMPREHEN METABOLIC PANEL: CPT | Performed by: INTERNAL MEDICINE

## 2021-06-28 PROCEDURE — 80061 LIPID PANEL: CPT | Performed by: INTERNAL MEDICINE

## 2021-06-28 PROCEDURE — 84443 ASSAY THYROID STIM HORMONE: CPT | Performed by: INTERNAL MEDICINE

## 2021-06-28 NOTE — PROGRESS NOTES
Devora Otto is a 68year old female. HPI:   She has had increasing edema of the LLE, venous doppler neg for DVT, then hydrochlorothiazide 25 mg added daily by Dr Evelyn Keita, then reduced to 12.5 mg daily by Dr Ioana Wheeler and edema increased again.      Her BP ha Diagnosis Date   • Dougherty's esophagus 2008   • Chronic neck pain    • DVT (deep venous thrombosis) (HCC)    • Hematuria 1994    w/u   • High blood pressure    • High cholesterol    • High level of uric acid in blood 2009    10.4   • Pulmonary emboli (HC

## 2021-06-29 NOTE — TELEPHONE ENCOUNTER
I did review her labs. I want her to stop hydrochlorothiazide. I want her to continue potassium chloride 10 mg twice daily for the next week and then repeat a BMP in 1 week. I think I want to see her in the office in about 1 month.   Tell her most likely

## 2021-06-29 NOTE — TELEPHONE ENCOUNTER
Received call: Lab    Potassium 2.9     Condition update: patient is asymptomatic    She does alternate potassium supplements 2 tablets 1 day total and 1 tablet the next day    Because she will have ongoing potassium losses with hydrochlorothiazide, I did

## 2021-06-29 NOTE — TELEPHONE ENCOUNTER
Pt. Called back she would like to speak with Dr. Shelia Herring. Pt. Wants to know if she should discontinue Hydrochlorothiazide, she believes this is causing her Potassium issue.  She wants to know if this is affecting her kidney's she noticed her kidney function is do

## 2021-06-29 NOTE — TELEPHONE ENCOUNTER
Spoke to pt and advised on MD message below; pt verbalized understanding. Pt wrote down instructions and provided correct read back. 1 month appt scheduled.  RN noted BMP was ordered already by Dr. Aravind Lucas (pt instructed to go 1 week instead of Thursday per [facility]

## 2021-07-06 ENCOUNTER — LAB ENCOUNTER (OUTPATIENT)
Dept: LAB | Facility: HOSPITAL | Age: 81
End: 2021-07-06
Attending: INTERNAL MEDICINE
Payer: MEDICARE

## 2021-07-06 ENCOUNTER — TELEPHONE (OUTPATIENT)
Dept: INTERNAL MEDICINE CLINIC | Facility: CLINIC | Age: 81
End: 2021-07-06

## 2021-07-06 DIAGNOSIS — E87.6 HYPOKALEMIA: ICD-10-CM

## 2021-07-06 LAB
ANION GAP SERPL CALC-SCNC: 6 MMOL/L (ref 0–18)
BUN BLD-MCNC: 21 MG/DL (ref 7–18)
BUN/CREAT SERPL: 18.9 (ref 10–20)
CALCIUM BLD-MCNC: 8.9 MG/DL (ref 8.5–10.1)
CHLORIDE SERPL-SCNC: 111 MMOL/L (ref 98–112)
CO2 SERPL-SCNC: 25 MMOL/L (ref 21–32)
CREAT BLD-MCNC: 1.11 MG/DL
GLUCOSE BLD-MCNC: 103 MG/DL (ref 70–99)
OSMOLALITY SERPL CALC.SUM OF ELEC: 297 MOSM/KG (ref 275–295)
PATIENT FASTING Y/N/NP: YES
POTASSIUM SERPL-SCNC: 3.8 MMOL/L (ref 3.5–5.1)
SODIUM SERPL-SCNC: 142 MMOL/L (ref 136–145)

## 2021-07-06 PROCEDURE — 80048 BASIC METABOLIC PNL TOTAL CA: CPT

## 2021-07-06 PROCEDURE — 36415 COLL VENOUS BLD VENIPUNCTURE: CPT

## 2021-07-16 ENCOUNTER — TELEPHONE (OUTPATIENT)
Dept: INTERNAL MEDICINE CLINIC | Facility: CLINIC | Age: 81
End: 2021-07-16

## 2021-07-16 NOTE — TELEPHONE ENCOUNTER
Urine culture from 6/28 is negative.     Repeat kidney function done on 7 6 much improved, continue current treatment regimen

## 2021-07-16 NOTE — TELEPHONE ENCOUNTER
Man answered phone and reports Leesaander Muniz is out shopping. He will ask her to call us back when she is available.

## 2021-07-21 ENCOUNTER — LAB ENCOUNTER (OUTPATIENT)
Dept: LAB | Facility: HOSPITAL | Age: 81
End: 2021-07-21
Attending: INTERNAL MEDICINE
Payer: MEDICARE

## 2021-07-21 DIAGNOSIS — I10 ESSENTIAL HYPERTENSION: ICD-10-CM

## 2021-07-21 LAB
ANION GAP SERPL CALC-SCNC: 8 MMOL/L (ref 0–18)
BUN BLD-MCNC: 20 MG/DL (ref 7–18)
BUN/CREAT SERPL: 17.5 (ref 10–20)
CALCIUM BLD-MCNC: 9.1 MG/DL (ref 8.5–10.1)
CHLORIDE SERPL-SCNC: 109 MMOL/L (ref 98–112)
CO2 SERPL-SCNC: 26 MMOL/L (ref 21–32)
CREAT BLD-MCNC: 1.14 MG/DL
GLUCOSE BLD-MCNC: 102 MG/DL (ref 70–99)
OSMOLALITY SERPL CALC.SUM OF ELEC: 299 MOSM/KG (ref 275–295)
PATIENT FASTING Y/N/NP: YES
POTASSIUM SERPL-SCNC: 3.6 MMOL/L (ref 3.5–5.1)
SODIUM SERPL-SCNC: 143 MMOL/L (ref 136–145)

## 2021-07-21 PROCEDURE — 36415 COLL VENOUS BLD VENIPUNCTURE: CPT

## 2021-07-21 PROCEDURE — 80048 BASIC METABOLIC PNL TOTAL CA: CPT

## 2021-07-26 ENCOUNTER — OFFICE VISIT (OUTPATIENT)
Dept: INTERNAL MEDICINE CLINIC | Facility: CLINIC | Age: 81
End: 2021-07-26
Payer: MEDICARE

## 2021-07-26 VITALS
HEART RATE: 67 BPM | BODY MASS INDEX: 33.77 KG/M2 | SYSTOLIC BLOOD PRESSURE: 136 MMHG | TEMPERATURE: 98 F | WEIGHT: 172 LBS | HEIGHT: 60 IN | DIASTOLIC BLOOD PRESSURE: 70 MMHG | OXYGEN SATURATION: 95 %

## 2021-07-26 DIAGNOSIS — I10 HYPERTENSION, BENIGN: ICD-10-CM

## 2021-07-26 DIAGNOSIS — E87.6 HYPOKALEMIA: Primary | ICD-10-CM

## 2021-07-26 DIAGNOSIS — R22.43 LOCALIZED SWELLING OF BOTH LOWER LEGS: ICD-10-CM

## 2021-07-26 DIAGNOSIS — N28.9 DISORDER OF KIDNEY AND URETER: ICD-10-CM

## 2021-07-26 PROCEDURE — 99214 OFFICE O/P EST MOD 30 MIN: CPT | Performed by: INTERNAL MEDICINE

## 2021-07-26 RX ORDER — HYDROCHLOROTHIAZIDE 25 MG/1
TABLET ORAL
COMMUNITY
Start: 2021-06-21 | End: 2021-07-26 | Stop reason: ALTCHOICE

## 2021-07-26 NOTE — PROGRESS NOTES
Freeman Canseco is a 68year old female. 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    She is feeling Dougherty's esophagus 2008   • Chronic neck pain    • DVT (deep venous thrombosis) (HCC)    • Hematuria 1994    w/u   • High blood pressure    • High cholesterol    • High level of uric acid in blood 2009    10.4   • Pulmonary emboli ([facility] [address] May/June 2015   • understanding of these issues and agrees to the plan.   The patient is asked to return in 2 months or sooner as needed

## 2021-07-28 RX ORDER — METOPROLOL SUCCINATE 100 MG/1
TABLET, EXTENDED RELEASE ORAL
Qty: 90 TABLET | Refills: 3 | Status: SHIPPED | OUTPATIENT
Start: 2021-07-28

## 2021-07-28 RX ORDER — FAMOTIDINE 40 MG/1
TABLET, FILM COATED ORAL
Qty: 180 TABLET | Refills: 3 | Status: SHIPPED | OUTPATIENT
Start: 2021-07-28

## 2021-07-28 RX ORDER — NIFEDIPINE 60 MG/1
TABLET, EXTENDED RELEASE ORAL
Qty: 90 TABLET | Refills: 3 | Status: SHIPPED | OUTPATIENT
Start: 2021-07-28

## 2021-08-05 ENCOUNTER — OFFICE VISIT (OUTPATIENT)
Dept: INTERNAL MEDICINE CLINIC | Facility: CLINIC | Age: 81
End: 2021-08-05
Payer: MEDICARE

## 2021-08-05 VITALS
HEART RATE: 67 BPM | SYSTOLIC BLOOD PRESSURE: 126 MMHG | OXYGEN SATURATION: 98 % | DIASTOLIC BLOOD PRESSURE: 66 MMHG | TEMPERATURE: 98 F | WEIGHT: 171.81 LBS | BODY MASS INDEX: 33.73 KG/M2 | HEIGHT: 60 IN

## 2021-08-05 DIAGNOSIS — M54.50 RIGHT LUMBAR PAIN: Primary | ICD-10-CM

## 2021-08-05 DIAGNOSIS — I10 HYPERTENSION, BENIGN: ICD-10-CM

## 2021-08-05 DIAGNOSIS — N18.30 STAGE 3 CHRONIC KIDNEY DISEASE, UNSPECIFIED WHETHER STAGE 3A OR 3B CKD (HCC): ICD-10-CM

## 2021-08-05 PROCEDURE — 99214 OFFICE O/P EST MOD 30 MIN: CPT | Performed by: INTERNAL MEDICINE

## 2021-08-05 NOTE — PROGRESS NOTES
Emili Tripp is a 68year old female. HPI:   Patient presents with:  Back Pain: Patient c/o back pain for past 2 weeks. Denies injuries. Pain 4/10 to back with activity. Con Prior is here for right lower lumbar pain for 2 weeks.   Movement causes incre Carbonate-Vitamin D (CALCIUM 600 + D OR) Take 1 tablet by mouth 2 (two) times daily.           Past Medical History:   Diagnosis Date   • Dougherty's esophagus 2008   • Chronic neck pain    • DVT (deep venous thrombosis) (HCC)    • Hematuria 1994    w/u   • H Tylenol as needed. I gave her the name of SalonPas with lidocaine. We can see how the pain goes for the next 2 weeks. If still present we can think of other strategies.   She is very comfortable with this plan and just wanted to make sure that it was not

## 2021-08-16 ENCOUNTER — TELEPHONE (OUTPATIENT)
Dept: INTERNAL MEDICINE CLINIC | Facility: CLINIC | Age: 81
End: 2021-08-16

## 2021-08-16 RX ORDER — NYSTATIN 100000 U/G
OINTMENT TOPICAL
Qty: 30 G | Refills: 1 | Status: SHIPPED | OUTPATIENT
Start: 2021-08-16 | End: 2023-11-13

## 2021-08-16 NOTE — TELEPHONE ENCOUNTER
To Dr. Aissatou Patterson to please advise---  Pt devloped a patchy, red rash with a strong odor yesterday beneath bilateral breasts. Pt reports this occurs often times in the summer and uses Nystatin ointment which helps to resolve this. Denies itching or pain. To MD for periodic review- unable to find this noted in last few OV notes. OK to refill? Pended.

## 2021-08-16 NOTE — TELEPHONE ENCOUNTER
Pt uses nystatin ointment periodically  & she only has a tiny bit left    Pt is using for a prickly rash she has due to the heat - Requests Rx is sent today to [facility]    She can be reached at [phone] if there are any questions

## 2021-08-17 ENCOUNTER — LAB ENCOUNTER (OUTPATIENT)
Dept: LAB | Facility: HOSPITAL | Age: 81
End: 2021-08-17
Attending: INTERNAL MEDICINE
Payer: MEDICARE

## 2021-08-17 DIAGNOSIS — E87.6 HYPOKALEMIA: ICD-10-CM

## 2021-08-17 LAB
ANION GAP SERPL CALC-SCNC: 5 MMOL/L (ref 0–18)
BUN BLD-MCNC: 21 MG/DL (ref 7–18)
BUN/CREAT SERPL: 19.3 (ref 10–20)
CALCIUM BLD-MCNC: 8.6 MG/DL (ref 8.5–10.1)
CHLORIDE SERPL-SCNC: 111 MMOL/L (ref 98–112)
CO2 SERPL-SCNC: 26 MMOL/L (ref 21–32)
CREAT BLD-MCNC: 1.09 MG/DL
GLUCOSE BLD-MCNC: 94 MG/DL (ref 70–99)
OSMOLALITY SERPL CALC.SUM OF ELEC: 297 MOSM/KG (ref 275–295)
PATIENT FASTING Y/N/NP: YES
POTASSIUM SERPL-SCNC: 3.7 MMOL/L (ref 3.5–5.1)
SODIUM SERPL-SCNC: 142 MMOL/L (ref 136–145)

## 2021-08-17 PROCEDURE — 80048 BASIC METABOLIC PNL TOTAL CA: CPT

## 2021-08-17 PROCEDURE — 36415 COLL VENOUS BLD VENIPUNCTURE: CPT

## 2021-08-22 ENCOUNTER — TELEPHONE (OUTPATIENT)
Dept: INTERNAL MEDICINE CLINIC | Facility: CLINIC | Age: 81
End: 2021-08-22

## 2021-08-22 DIAGNOSIS — E87.6 HYPOKALEMIA: Primary | ICD-10-CM

## 2021-08-23 RX ORDER — ATORVASTATIN CALCIUM 20 MG/1
TABLET, FILM COATED ORAL
Qty: 90 TABLET | Refills: 3 | Status: SHIPPED | OUTPATIENT
Start: 2021-08-23

## 2021-08-23 NOTE — TELEPHONE ENCOUNTER
Spoke with patient to relay MD message below; Patient verbalized understanding. Pt did asked to confirm, she should stay on Potassium as advise? Routed to Dr. Chelsy Enriquez to confirm.

## 2021-08-23 NOTE — TELEPHONE ENCOUNTER
Patient called and relayed Dr Yohan Nayak message. Patient will get standing BMP order done in 2 months as requested.

## 2021-10-22 ENCOUNTER — LAB ENCOUNTER (OUTPATIENT)
Dept: LAB | Facility: HOSPITAL | Age: 81
End: 2021-10-22
Attending: INTERNAL MEDICINE
Payer: MEDICARE

## 2021-10-22 DIAGNOSIS — E87.6 HYPOKALEMIA: ICD-10-CM

## 2021-10-22 PROCEDURE — 80048 BASIC METABOLIC PNL TOTAL CA: CPT

## 2021-10-22 PROCEDURE — 36415 COLL VENOUS BLD VENIPUNCTURE: CPT

## 2021-10-22 NOTE — TELEPHONE ENCOUNTER
Dr Heather Norris, please review lab results for Dr. Martín Weinstein. Ok to wait for Dr. Martín Weinstein?

## 2021-10-26 ENCOUNTER — TELEPHONE (OUTPATIENT)
Dept: INTERNAL MEDICINE CLINIC | Facility: CLINIC | Age: 81
End: 2021-10-26

## 2021-10-26 NOTE — TELEPHONE ENCOUNTER
Spoke with patient to relay MD message below; Patient verbalized understanding. Pt did have question as to whether or not we are making any changes to Potassium dose?  She is currently alternating between Potassium 10meq Si tabs po daily every other day

## 2021-10-29 ENCOUNTER — OFFICE VISIT (OUTPATIENT)
Dept: INTERNAL MEDICINE CLINIC | Facility: CLINIC | Age: 81
End: 2021-10-29
Payer: MEDICARE

## 2021-10-29 VITALS
RESPIRATION RATE: 16 BRPM | OXYGEN SATURATION: 97 % | SYSTOLIC BLOOD PRESSURE: 124 MMHG | DIASTOLIC BLOOD PRESSURE: 68 MMHG | HEART RATE: 70 BPM | TEMPERATURE: 99 F | WEIGHT: 170.19 LBS | BODY MASS INDEX: 31.32 KG/M2 | HEIGHT: 62 IN

## 2021-10-29 DIAGNOSIS — I26.99 OTHER PULMONARY EMBOLISM WITHOUT ACUTE COR PULMONALE, UNSPECIFIED CHRONICITY (HCC): ICD-10-CM

## 2021-10-29 DIAGNOSIS — I63.9 THALAMIC STROKE (HCC): ICD-10-CM

## 2021-10-29 DIAGNOSIS — M25.512 ACUTE PAIN OF LEFT SHOULDER: Primary | ICD-10-CM

## 2021-10-29 DIAGNOSIS — E78.00 HYPERCHOLESTEROLEMIA: ICD-10-CM

## 2021-10-29 DIAGNOSIS — I10 HYPERTENSION, BENIGN: ICD-10-CM

## 2021-10-29 PROCEDURE — 99214 OFFICE O/P EST MOD 30 MIN: CPT | Performed by: INTERNAL MEDICINE

## 2021-10-29 NOTE — PROGRESS NOTES
Luann Morejon is a 80year old female. HPI:   She has noted pain in the left shoulder region off and on for the last 2 weeks, some pain left axilla. Seemed to start when she was twisting her neck to back out of the driveway. No fall or injury.  The neck a daily.     • Calcium Carbonate-Vitamin D (CALCIUM 600 + D OR) Take 1 tablet by mouth 2 (two) times daily.           Past Medical History:   Diagnosis Date   • Dougherty's esophagus 2008   • Chronic neck pain    • DVT (deep venous thrombosis) (HCC)    • Hematu indicates understanding of these issues and agrees to the plan.   The patient is asked to return in 3-4 months

## 2021-12-14 ENCOUNTER — LAB ENCOUNTER (OUTPATIENT)
Dept: LAB | Facility: HOSPITAL | Age: 81
End: 2021-12-14
Attending: INTERNAL MEDICINE
Payer: MEDICARE

## 2021-12-14 DIAGNOSIS — N18.30 STAGE 3 CHRONIC KIDNEY DISEASE, UNSPECIFIED WHETHER STAGE 3A OR 3B CKD (HCC): ICD-10-CM

## 2021-12-14 PROCEDURE — 36415 COLL VENOUS BLD VENIPUNCTURE: CPT

## 2021-12-14 PROCEDURE — 80069 RENAL FUNCTION PANEL: CPT

## 2021-12-30 ENCOUNTER — LAB REQUISITION (OUTPATIENT)
Dept: LAB | Age: 81
End: 2021-12-30

## 2021-12-30 DIAGNOSIS — N89.8 OTHER SPECIFIED NONINFLAMMATORY DISORDERS OF VAGINA: ICD-10-CM

## 2021-12-30 PROCEDURE — 88305 TISSUE EXAM BY PATHOLOGIST: CPT | Performed by: CLINICAL MEDICAL LABORATORY

## 2022-01-03 ENCOUNTER — HOSPITAL ENCOUNTER (OUTPATIENT)
Dept: ULTRASOUND IMAGING | Age: 82
Discharge: HOME | End: 2022-01-03
Attending: OBSTETRICS & GYNECOLOGY

## 2022-01-03 DIAGNOSIS — N95.0 POST-MENOPAUSE BLEEDING: ICD-10-CM

## 2022-01-03 LAB
ASR DISCLAIMER: NORMAL
CASE RPRT: NORMAL
CLINICAL INFO: NORMAL
PATH REPORT.FINAL DX SPEC: NORMAL
PATH REPORT.GROSS SPEC: NORMAL

## 2022-01-03 PROCEDURE — 76830 TRANSVAGINAL US NON-OB: CPT

## 2022-01-17 RX ORDER — POTASSIUM CHLORIDE 750 MG/1
TABLET, EXTENDED RELEASE ORAL
Qty: 135 TABLET | Refills: 3 | Status: SHIPPED | OUTPATIENT
Start: 2022-01-17

## 2022-03-21 ENCOUNTER — OFFICE VISIT (OUTPATIENT)
Dept: INTERNAL MEDICINE CLINIC | Facility: CLINIC | Age: 82
End: 2022-03-21
Payer: MEDICARE

## 2022-03-21 ENCOUNTER — TELEPHONE (OUTPATIENT)
Dept: INTERNAL MEDICINE CLINIC | Facility: CLINIC | Age: 82
End: 2022-03-21

## 2022-03-21 VITALS
WEIGHT: 169.19 LBS | SYSTOLIC BLOOD PRESSURE: 128 MMHG | TEMPERATURE: 98 F | DIASTOLIC BLOOD PRESSURE: 70 MMHG | BODY MASS INDEX: 31 KG/M2 | OXYGEN SATURATION: 96 % | HEART RATE: 67 BPM

## 2022-03-21 DIAGNOSIS — I10 HYPERTENSION, BENIGN: ICD-10-CM

## 2022-03-21 DIAGNOSIS — M19.049 ARTHRITIS OF HAND: Primary | ICD-10-CM

## 2022-03-21 PROCEDURE — 99214 OFFICE O/P EST MOD 30 MIN: CPT | Performed by: INTERNAL MEDICINE

## 2022-03-21 NOTE — TELEPHONE ENCOUNTER
Tell her that I did have a thought after she left. We can get x-rays of both hands, I do not think it will make any difference in terms of management but it may serve as a reference for the future for comparison. If she would like to do this please order x-rays of both hands, diagnosis osteoarthritis. The x-rays would be strictly at her discretion.

## 2022-03-22 NOTE — TELEPHONE ENCOUNTER
Spoke with patient to relay MD message below; Patient verbalized understanding and would like to do XRAYs of KARLIE Hands. Routed Back to Dr. Ector Wong, How many views? 1,2 or 3?

## 2022-03-23 ENCOUNTER — LAB REQUISITION (OUTPATIENT)
Dept: LAB | Age: 82
End: 2022-03-23

## 2022-03-23 DIAGNOSIS — N76.0 ACUTE VAGINITIS: ICD-10-CM

## 2022-03-23 PROCEDURE — 87205 SMEAR GRAM STAIN: CPT | Performed by: CLINICAL MEDICAL LABORATORY

## 2022-03-23 PROCEDURE — 87070 CULTURE OTHR SPECIMN AEROBIC: CPT | Performed by: CLINICAL MEDICAL LABORATORY

## 2022-03-27 LAB
BACTERIA GENITAL AEROBE CULT: ABNORMAL
GRAM STN SPEC: ABNORMAL

## 2022-04-04 ENCOUNTER — HOSPITAL ENCOUNTER (OUTPATIENT)
Dept: MAMMOGRAPHY | Facility: HOSPITAL | Age: 82
Discharge: HOME | End: 2022-04-04
Attending: OBSTETRICS & GYNECOLOGY
Payer: MEDICARE

## 2022-04-04 ENCOUNTER — HOSPITAL ENCOUNTER (OUTPATIENT)
Dept: GENERAL RADIOLOGY | Facility: HOSPITAL | Age: 82
Discharge: HOME | End: 2022-04-04
Attending: INTERNAL MEDICINE
Payer: MEDICARE

## 2022-04-04 DIAGNOSIS — M19.042 PRIMARY OSTEOARTHRITIS OF BOTH HANDS: ICD-10-CM

## 2022-04-04 DIAGNOSIS — M19.041 PRIMARY OSTEOARTHRITIS OF BOTH HANDS: ICD-10-CM

## 2022-04-04 DIAGNOSIS — Z12.31 ENCOUNTER FOR MAMMOGRAM TO ESTABLISH BASELINE MAMMOGRAM: ICD-10-CM

## 2022-04-04 PROCEDURE — 77067 SCR MAMMO BI INCL CAD: CPT | Performed by: OBSTETRICS & GYNECOLOGY

## 2022-04-04 PROCEDURE — 77063 BREAST TOMOSYNTHESIS BI: CPT | Performed by: OBSTETRICS & GYNECOLOGY

## 2022-04-04 PROCEDURE — 73130 X-RAY EXAM OF HAND: CPT | Performed by: INTERNAL MEDICINE

## 2022-07-18 RX ORDER — FAMOTIDINE 40 MG/1
TABLET, FILM COATED ORAL
Qty: 180 TABLET | Refills: 3 | Status: CANCELLED | OUTPATIENT
Start: 2022-07-18

## 2022-07-19 NOTE — TELEPHONE ENCOUNTER
Pt due for PE. To FD, please call pt to schedule then back to Rx.  (fasting lab orders in system already)

## 2022-07-19 NOTE — TELEPHONE ENCOUNTER
Contacted pt. And scheduled her for her [address] on 8/3/22. Informed her of fasting lab orders.

## 2022-07-21 RX ORDER — FAMOTIDINE 40 MG/1
TABLET, FILM COATED ORAL
Qty: 180 TABLET | Refills: 3 | Status: SHIPPED | OUTPATIENT
Start: 2022-07-21

## 2022-07-21 RX ORDER — METOPROLOL SUCCINATE 100 MG/1
TABLET, EXTENDED RELEASE ORAL
Qty: 90 TABLET | Refills: 3 | Status: SHIPPED | OUTPATIENT
Start: 2022-07-21

## 2022-07-21 RX ORDER — NIFEDIPINE 60 MG/1
TABLET, EXTENDED RELEASE ORAL
Qty: 90 TABLET | Refills: 3 | Status: SHIPPED | OUTPATIENT
Start: 2022-07-21

## 2022-07-29 ENCOUNTER — TELEPHONE (OUTPATIENT)
Dept: INTERNAL MEDICINE CLINIC | Facility: CLINIC | Age: 82
End: 2022-07-29

## 2022-07-29 ENCOUNTER — LAB ENCOUNTER (OUTPATIENT)
Dept: LAB | Facility: HOSPITAL | Age: 82
End: 2022-07-29
Attending: INTERNAL MEDICINE
Payer: MEDICARE

## 2022-07-29 DIAGNOSIS — I10 HYPERTENSION, BENIGN: ICD-10-CM

## 2022-07-29 LAB
ALBUMIN SERPL-MCNC: 3.5 G/DL (ref 3.4–5)
ALBUMIN/GLOB SERPL: 1 {RATIO} (ref 1–2)
ALP LIVER SERPL-CCNC: 110 U/L
ALT SERPL-CCNC: 16 U/L
ANION GAP SERPL CALC-SCNC: 7 MMOL/L (ref 0–18)
AST SERPL-CCNC: 15 U/L (ref 15–37)
BASOPHILS # BLD AUTO: 0.04 X10(3) UL (ref 0–0.2)
BASOPHILS NFR BLD AUTO: 0.3 %
BILIRUB SERPL-MCNC: 0.9 MG/DL (ref 0.1–2)
BILIRUB UR QL: NEGATIVE
BUN BLD-MCNC: 23 MG/DL (ref 7–18)
BUN/CREAT SERPL: 20.4 (ref 10–20)
CALCIUM BLD-MCNC: 8.9 MG/DL (ref 8.5–10.1)
CHLORIDE SERPL-SCNC: 109 MMOL/L (ref 98–112)
CHOLEST SERPL-MCNC: 136 MG/DL (ref ?–200)
CO2 SERPL-SCNC: 25 MMOL/L (ref 21–32)
COLOR UR: YELLOW
CREAT BLD-MCNC: 1.13 MG/DL
DEPRECATED RDW RBC AUTO: 43.2 FL (ref 35.1–46.3)
EOSINOPHIL # BLD AUTO: 0.18 X10(3) UL (ref 0–0.7)
EOSINOPHIL NFR BLD AUTO: 1.4 %
ERYTHROCYTE [DISTWIDTH] IN BLOOD BY AUTOMATED COUNT: 12.3 % (ref 11–15)
FASTING PATIENT LIPID ANSWER: YES
FASTING STATUS PATIENT QL REPORTED: YES
GLOBULIN PLAS-MCNC: 3.6 G/DL (ref 2.8–4.4)
GLUCOSE BLD-MCNC: 102 MG/DL (ref 70–99)
GLUCOSE UR-MCNC: NEGATIVE MG/DL
HCT VFR BLD AUTO: 38.8 %
HDLC SERPL-MCNC: 59 MG/DL (ref 40–59)
HGB BLD-MCNC: 12.8 G/DL
IMM GRANULOCYTES # BLD AUTO: 0.06 X10(3) UL (ref 0–1)
IMM GRANULOCYTES NFR BLD: 0.5 %
KETONES UR-MCNC: NEGATIVE MG/DL
LDLC SERPL CALC-MCNC: 63 MG/DL (ref ?–100)
LYMPHOCYTES # BLD AUTO: 1.93 X10(3) UL (ref 1–4)
LYMPHOCYTES NFR BLD AUTO: 15.5 %
MCH RBC QN AUTO: 31.3 PG (ref 26–34)
MCHC RBC AUTO-ENTMCNC: 33 G/DL (ref 31–37)
MCV RBC AUTO: 94.9 FL
MONOCYTES # BLD AUTO: 1.17 X10(3) UL (ref 0.1–1)
MONOCYTES NFR BLD AUTO: 9.4 %
NEUTROPHILS # BLD AUTO: 9.05 X10 (3) UL (ref 1.5–7.7)
NEUTROPHILS # BLD AUTO: 9.05 X10(3) UL (ref 1.5–7.7)
NEUTROPHILS NFR BLD AUTO: 72.9 %
NITRITE UR QL STRIP.AUTO: POSITIVE
NONHDLC SERPL-MCNC: 77 MG/DL (ref ?–130)
OSMOLALITY SERPL CALC.SUM OF ELEC: 296 MOSM/KG (ref 275–295)
PH UR: 6 [PH] (ref 5–8)
PLATELET # BLD AUTO: 333 10(3)UL (ref 150–450)
POTASSIUM SERPL-SCNC: 3.4 MMOL/L (ref 3.5–5.1)
PROT SERPL-MCNC: 7.1 G/DL (ref 6.4–8.2)
PROT UR-MCNC: >=500 MG/DL
RBC # BLD AUTO: 4.09 X10(6)UL
RBC #/AREA URNS AUTO: >10 /HPF
SODIUM SERPL-SCNC: 141 MMOL/L (ref 136–145)
SP GR UR STRIP: 1.02 (ref 1–1.03)
TRIGL SERPL-MCNC: 67 MG/DL (ref 30–149)
TSI SER-ACNC: 2.2 MIU/ML (ref 0.36–3.74)
UROBILINOGEN UR STRIP-ACNC: <2
VIT C UR-MCNC: NEGATIVE MG/DL
VLDLC SERPL CALC-MCNC: 10 MG/DL (ref 0–30)
WBC # BLD AUTO: 12.4 X10(3) UL (ref 4–11)
WBC #/AREA URNS AUTO: >50 /HPF
WBC CLUMPS UR QL AUTO: PRESENT /HPF

## 2022-07-29 PROCEDURE — 84443 ASSAY THYROID STIM HORMONE: CPT

## 2022-07-29 PROCEDURE — 87186 SC STD MICRODIL/AGAR DIL: CPT | Performed by: INTERNAL MEDICINE

## 2022-07-29 PROCEDURE — 80061 LIPID PANEL: CPT

## 2022-07-29 PROCEDURE — 80053 COMPREHEN METABOLIC PANEL: CPT

## 2022-07-29 PROCEDURE — 85025 COMPLETE CBC W/AUTO DIFF WBC: CPT

## 2022-07-29 PROCEDURE — 81001 URINALYSIS AUTO W/SCOPE: CPT | Performed by: INTERNAL MEDICINE

## 2022-07-29 PROCEDURE — 36415 COLL VENOUS BLD VENIPUNCTURE: CPT

## 2022-07-29 PROCEDURE — 87077 CULTURE AEROBIC IDENTIFY: CPT | Performed by: INTERNAL MEDICINE

## 2022-07-29 PROCEDURE — 87086 URINE CULTURE/COLONY COUNT: CPT | Performed by: INTERNAL MEDICINE

## 2022-07-29 NOTE — TELEPHONE ENCOUNTER
Phone call to patient. Patient's urinalysis looks suspicious for possible UTI. Patient has no urinary symptoms. She has no dysuria or urinary frequency. She feels well. Patient notes that she is seeing a urogynecologist considering doing a bladder lift on the patient. There are gynecologist has seen the urinalysis result and recommended the patient not be treated at this time. I told patient that since she is asymptomatic I would not treat her right now but I would wait for the final urine culture result. She is going to see Dr. Abel Henry next week.   I will forward this message to Dr. Abel Henry

## 2022-08-01 RX ORDER — VANCOMYCIN HYDROCHLORIDE 125 MG/1
125 CAPSULE ORAL 4 TIMES DAILY
Qty: 15 CAPSULE | Refills: 0 | Status: SHIPPED | OUTPATIENT
Start: 2022-08-01

## 2022-08-01 RX ORDER — CEPHALEXIN 500 MG/1
500 CAPSULE ORAL 2 TIMES DAILY
Qty: 10 CAPSULE | Refills: 0 | Status: SHIPPED | OUTPATIENT
Start: 2022-08-01

## 2022-08-01 NOTE — TELEPHONE ENCOUNTER
She has UTI - give Keflex 500 mg bid for 5 days and cover with vancomycin 125 mg tid for 5 days as she has had C diff in the past. seizures

## 2022-08-01 NOTE — TELEPHONE ENCOUNTER
Patient is calling back, per pharmacy they did not receive the medications sent today. Can we please send them again?

## 2022-08-01 NOTE — TELEPHONE ENCOUNTER
Pt. Called back. Pharmacy did receive the Cephalexin, but they did not receive Vancomycin. Could that be resent to [employer] in [facility].

## 2022-08-01 NOTE — TELEPHONE ENCOUNTER
Pt called  over the weekend she had a small amount of blood in her urine   also feels some pressure (mostly when she is walking)    requests call back from Dr Galeano Los Ebanos nurse    [phone]

## 2022-08-03 ENCOUNTER — OFFICE VISIT (OUTPATIENT)
Dept: INTERNAL MEDICINE CLINIC | Facility: CLINIC | Age: 82
End: 2022-08-03
Payer: MEDICARE

## 2022-08-03 VITALS
TEMPERATURE: 98 F | DIASTOLIC BLOOD PRESSURE: 72 MMHG | WEIGHT: 165 LBS | SYSTOLIC BLOOD PRESSURE: 110 MMHG | OXYGEN SATURATION: 98 % | HEART RATE: 70 BPM | BODY MASS INDEX: 30.36 KG/M2 | HEIGHT: 62 IN

## 2022-08-03 DIAGNOSIS — Z00.00 ENCOUNTER FOR MEDICARE ANNUAL WELLNESS EXAM: Primary | ICD-10-CM

## 2022-08-03 DIAGNOSIS — K22.719 BARRETT'S ESOPHAGUS WITH DYSPLASIA: ICD-10-CM

## 2022-08-03 DIAGNOSIS — E78.00 HYPERCHOLESTEROLEMIA: ICD-10-CM

## 2022-08-03 DIAGNOSIS — I10 HYPERTENSION, BENIGN: ICD-10-CM

## 2022-08-03 PROCEDURE — 1126F AMNT PAIN NOTED NONE PRSNT: CPT | Performed by: INTERNAL MEDICINE

## 2022-08-03 PROCEDURE — G0439 PPPS, SUBSEQ VISIT: HCPCS | Performed by: INTERNAL MEDICINE

## 2022-08-05 ENCOUNTER — TELEPHONE (OUTPATIENT)
Dept: INTERNAL MEDICINE CLINIC | Facility: CLINIC | Age: 82
End: 2022-08-05

## 2022-08-05 DIAGNOSIS — A09 DIARRHEA OF INFECTIOUS ORIGIN: Primary | ICD-10-CM

## 2022-08-05 NOTE — TELEPHONE ENCOUNTER
Patient is calling to speak with a nurse. Patient has started taking two different antibiotics on Monday and has had no symptoms. Patient states starting last night, she has noticed that she has some diarrhea. Patient state she has one tablet left of vancomycin and two tablets left of the cephalexin, patient is wondering if she should finish them or stop them now?      # [phone]

## 2022-08-05 NOTE — TELEPHONE ENCOUNTER
Please notify patient that I received her message in the absence of Dr. Abbe Rothman. 1.  Looks like Keflex was given to her for UTI July 29.    2.  Vancomycin was given for her history of C. Difficile    3. If she is not having any urinary symptoms I will go ahead and stop the Keflex. 4.  Finish the vancomycin    5. History of C. difficile if diarrhea continues I placed order for C. Difficile. She would have to  the container at the lab at the [facility] over the weekend if she wishes to complete that.     6.  Otherwise it might be reasonable to stop her Keflex and continue the vancomycin for what she has left and monitor her GI symptoms to see if diarrhea resolves after stopping Keflex

## 2022-08-06 ENCOUNTER — LAB ENCOUNTER (OUTPATIENT)
Dept: LAB | Facility: HOSPITAL | Age: 82
End: 2022-08-06
Attending: INTERNAL MEDICINE
Payer: MEDICARE

## 2022-08-06 DIAGNOSIS — A09 DIARRHEA OF INFECTIOUS ORIGIN: ICD-10-CM

## 2022-08-06 PROCEDURE — 87493 C DIFF AMPLIFIED PROBE: CPT

## 2022-08-07 LAB — C DIFF TOX B STL QL: NEGATIVE

## 2022-08-08 NOTE — TELEPHONE ENCOUNTER
Please call patient and let her know her C. difficile came out good. Negative. I will forward results on to Dr. Alondra Peterson    ( [facility].  Nadege Villa )

## 2022-08-15 RX ORDER — ATORVASTATIN CALCIUM 20 MG/1
TABLET, FILM COATED ORAL
Qty: 90 TABLET | Refills: 3 | Status: SHIPPED | OUTPATIENT
Start: 2022-08-15

## 2022-09-29 ENCOUNTER — TELEPHONE (OUTPATIENT)
Dept: INTERNAL MEDICINE CLINIC | Facility: CLINIC | Age: 82
End: 2022-09-29

## 2022-09-29 ENCOUNTER — HOSPITAL ENCOUNTER (OUTPATIENT)
Age: 82
Discharge: HOME | End: 2022-09-29
Attending: EMERGENCY MEDICINE
Payer: MEDICARE

## 2022-09-29 ENCOUNTER — APPOINTMENT (OUTPATIENT)
Dept: ULTRASOUND IMAGING | Age: 82
End: 2022-09-29
Attending: EMERGENCY MEDICINE
Payer: MEDICARE

## 2022-09-29 VITALS
OXYGEN SATURATION: 98 % | RESPIRATION RATE: 18 BRPM | DIASTOLIC BLOOD PRESSURE: 71 MMHG | TEMPERATURE: 98 F | HEART RATE: 78 BPM | SYSTOLIC BLOOD PRESSURE: 137 MMHG

## 2022-09-29 DIAGNOSIS — S86.812A STRAIN OF LEFT CALF MUSCLE: Primary | ICD-10-CM

## 2022-09-29 PROCEDURE — 99214 OFFICE O/P EST MOD 30 MIN: CPT

## 2022-09-29 PROCEDURE — 93971 EXTREMITY STUDY: CPT | Performed by: EMERGENCY MEDICINE

## 2022-09-29 PROCEDURE — 99213 OFFICE O/P EST LOW 20 MIN: CPT

## 2022-09-29 NOTE — ED INITIAL ASSESSMENT (HPI)
Pt presents with left calf pain x 4 days. Pt reports wearing \"uncomfortable shoes Sunday\", developed pain soon after. No redness and not warm to the touch. LLE has hx of swelling. No new swelling reported by pt. Hx of DVT 7 years ago.

## 2022-09-29 NOTE — TELEPHONE ENCOUNTER
Patient is calling and states she has pain in her left leg calf. The pain started last Sunday. Patient wore a shoe that messed with her foot and leg on that Sunday. The foot pain went away but the pain in the leg is still there. Patient did have a blood clot in that leg in 2015. Patient just wants to make sure nothing serious wrong. The pain comes and goes and its 5 out of 10 on the pain scale.

## 2022-10-10 ENCOUNTER — TELEPHONE (OUTPATIENT)
Dept: INTERNAL MEDICINE CLINIC | Facility: CLINIC | Age: 82
End: 2022-10-10

## 2022-10-10 NOTE — TELEPHONE ENCOUNTER
Patient is calling she said she received a call from nursing at 2:30 pm  She accidentally hit delete before she listened to the voicemail

## 2022-10-11 NOTE — TELEPHONE ENCOUNTER
Darren Collazo called this morning. Call was for her , Mounika Hall. Telephone encounter 10/10 under Raul Scott.

## 2023-01-10 RX ORDER — POTASSIUM CHLORIDE 750 MG/1
TABLET, EXTENDED RELEASE ORAL
Qty: 135 TABLET | Refills: 3 | Status: SHIPPED | OUTPATIENT
Start: 2023-01-10

## 2023-02-08 ENCOUNTER — TELEPHONE (OUTPATIENT)
Dept: INTERNAL MEDICINE CLINIC | Facility: CLINIC | Age: 83
End: 2023-02-08

## 2023-02-08 NOTE — TELEPHONE ENCOUNTER
Patient calling to request blood work prior to:    [] physical    [x] check-up      [] other    Patient uses:  [x] [facility] labs [] [facility]    Patient prefers to be notified once labs are placed by: [x] phone call  [] my chart message    Has appointment with Dr. Asha Carver 2/15 for 6 month follow up.     Best call back number [phone], may leave message on machine Statement Selected

## 2023-02-10 ENCOUNTER — LAB ENCOUNTER (OUTPATIENT)
Dept: LAB | Facility: HOSPITAL | Age: 83
End: 2023-02-10
Attending: INTERNAL MEDICINE
Payer: MEDICARE

## 2023-02-10 ENCOUNTER — TELEPHONE (OUTPATIENT)
Dept: INTERNAL MEDICINE CLINIC | Facility: CLINIC | Age: 83
End: 2023-02-10

## 2023-02-10 DIAGNOSIS — E78.00 HYPERCHOLESTEREMIA: ICD-10-CM

## 2023-02-10 LAB
ALBUMIN SERPL-MCNC: 3.4 G/DL (ref 3.4–5)
ALBUMIN/GLOB SERPL: 1 {RATIO} (ref 1–2)
ALP LIVER SERPL-CCNC: 117 U/L
ALT SERPL-CCNC: 22 U/L
ANION GAP SERPL CALC-SCNC: 7 MMOL/L (ref 0–18)
AST SERPL-CCNC: 18 U/L (ref 15–37)
BASOPHILS # BLD AUTO: 0.06 X10(3) UL (ref 0–0.2)
BASOPHILS NFR BLD AUTO: 0.8 %
BILIRUB SERPL-MCNC: 0.9 MG/DL (ref 0.1–2)
BUN BLD-MCNC: 16 MG/DL (ref 7–18)
BUN/CREAT SERPL: 14 (ref 10–20)
CALCIUM BLD-MCNC: 8.9 MG/DL (ref 8.5–10.1)
CHLORIDE SERPL-SCNC: 109 MMOL/L (ref 98–112)
CHOLEST SERPL-MCNC: 136 MG/DL (ref ?–200)
CO2 SERPL-SCNC: 28 MMOL/L (ref 21–32)
CREAT BLD-MCNC: 1.14 MG/DL
DEPRECATED RDW RBC AUTO: 44.2 FL (ref 35.1–46.3)
EOSINOPHIL # BLD AUTO: 0.33 X10(3) UL (ref 0–0.7)
EOSINOPHIL NFR BLD AUTO: 4.2 %
ERYTHROCYTE [DISTWIDTH] IN BLOOD BY AUTOMATED COUNT: 12.7 % (ref 11–15)
FASTING PATIENT LIPID ANSWER: YES
FASTING STATUS PATIENT QL REPORTED: YES
GFR SERPLBLD BASED ON 1.73 SQ M-ARVRAT: 49 ML/MIN/1.73M2 (ref 60–?)
GLOBULIN PLAS-MCNC: 3.5 G/DL (ref 2.8–4.4)
GLUCOSE BLD-MCNC: 110 MG/DL (ref 70–99)
HCT VFR BLD AUTO: 40.5 %
HDLC SERPL-MCNC: 79 MG/DL (ref 40–59)
HGB BLD-MCNC: 13.3 G/DL
IMM GRANULOCYTES # BLD AUTO: 0.02 X10(3) UL (ref 0–1)
IMM GRANULOCYTES NFR BLD: 0.3 %
LDLC SERPL CALC-MCNC: 45 MG/DL (ref ?–100)
LYMPHOCYTES # BLD AUTO: 2.86 X10(3) UL (ref 1–4)
LYMPHOCYTES NFR BLD AUTO: 36.6 %
MCH RBC QN AUTO: 31.1 PG (ref 26–34)
MCHC RBC AUTO-ENTMCNC: 32.8 G/DL (ref 31–37)
MCV RBC AUTO: 94.8 FL
MONOCYTES # BLD AUTO: 0.84 X10(3) UL (ref 0.1–1)
MONOCYTES NFR BLD AUTO: 10.8 %
NEUTROPHILS # BLD AUTO: 3.7 X10 (3) UL (ref 1.5–7.7)
NEUTROPHILS # BLD AUTO: 3.7 X10(3) UL (ref 1.5–7.7)
NEUTROPHILS NFR BLD AUTO: 47.3 %
NONHDLC SERPL-MCNC: 57 MG/DL (ref ?–130)
OSMOLALITY SERPL CALC.SUM OF ELEC: 300 MOSM/KG (ref 275–295)
PLATELET # BLD AUTO: 310 10(3)UL (ref 150–450)
POTASSIUM SERPL-SCNC: 3.2 MMOL/L (ref 3.5–5.1)
PROT SERPL-MCNC: 6.9 G/DL (ref 6.4–8.2)
RBC # BLD AUTO: 4.27 X10(6)UL
SODIUM SERPL-SCNC: 144 MMOL/L (ref 136–145)
TRIGL SERPL-MCNC: 56 MG/DL (ref 30–149)
TSI SER-ACNC: 3.04 MIU/ML (ref 0.36–3.74)
VLDLC SERPL CALC-MCNC: 8 MG/DL (ref 0–30)
WBC # BLD AUTO: 7.8 X10(3) UL (ref 4–11)

## 2023-02-10 PROCEDURE — 87086 URINE CULTURE/COLONY COUNT: CPT | Performed by: INTERNAL MEDICINE

## 2023-02-10 PROCEDURE — 85025 COMPLETE CBC W/AUTO DIFF WBC: CPT

## 2023-02-10 PROCEDURE — 84443 ASSAY THYROID STIM HORMONE: CPT

## 2023-02-10 PROCEDURE — 80061 LIPID PANEL: CPT

## 2023-02-10 PROCEDURE — 80053 COMPREHEN METABOLIC PANEL: CPT

## 2023-02-10 PROCEDURE — 81001 URINALYSIS AUTO W/SCOPE: CPT | Performed by: INTERNAL MEDICINE

## 2023-02-10 PROCEDURE — 81015 MICROSCOPIC EXAM OF URINE: CPT | Performed by: INTERNAL MEDICINE

## 2023-02-10 PROCEDURE — 36415 COLL VENOUS BLD VENIPUNCTURE: CPT

## 2023-02-10 RX ORDER — POTASSIUM CHLORIDE 750 MG/1
20 TABLET, EXTENDED RELEASE ORAL DAILY
Qty: 180 TABLET | Refills: 3 | Status: SHIPPED | OUTPATIENT
Start: 2023-02-10

## 2023-02-10 NOTE — TELEPHONE ENCOUNTER
Please call patient and let her know that I was forwarded her blood and urine test results in my absence of Dr. Rain Ewing    1. Please let her know that she does have some abnormalities in her urine. We are waiting for the culture. Please ask if she has any acute urinary symptoms. If not we can wait for culture results before deciding on treatment. She has had similar abnormalities in her urine in the past.    2.  Also she is mildly low on her potassium. It looks like she is on potassium supplement. I will wait for Dr. Rain Ewing to address this.

## 2023-02-10 NOTE — TELEPHONE ENCOUNTER
Called and spoke with patient. Relayed Dr. Krysta Mueller message. Patient is not having any urinary symptoms.      FYI to Dr. Jazzy Bustos--

## 2023-02-10 NOTE — TELEPHONE ENCOUNTER
To Dr Meredith De Paz you please review lab results for Dr Jaimie Vidal patient. Ok to wait?   Especially Urine testing  Past urine tests over last few years show some similar findings  A urine culture is in progress

## 2023-02-15 ENCOUNTER — OFFICE VISIT (OUTPATIENT)
Dept: INTERNAL MEDICINE CLINIC | Facility: CLINIC | Age: 83
End: 2023-02-15

## 2023-02-15 VITALS
TEMPERATURE: 98 F | HEART RATE: 74 BPM | WEIGHT: 160 LBS | OXYGEN SATURATION: 96 % | BODY MASS INDEX: 29.44 KG/M2 | SYSTOLIC BLOOD PRESSURE: 128 MMHG | DIASTOLIC BLOOD PRESSURE: 74 MMHG | HEIGHT: 62 IN

## 2023-02-15 DIAGNOSIS — M25.511 ACUTE PAIN OF RIGHT SHOULDER: Primary | ICD-10-CM

## 2023-02-15 DIAGNOSIS — R11.0 NAUSEA: ICD-10-CM

## 2023-02-15 DIAGNOSIS — N28.9 RENAL INSUFFICIENCY: ICD-10-CM

## 2023-02-15 DIAGNOSIS — R31.29 MICROHEMATURIA: ICD-10-CM

## 2023-02-15 PROCEDURE — 99214 OFFICE O/P EST MOD 30 MIN: CPT | Performed by: INTERNAL MEDICINE

## 2023-02-15 PROCEDURE — 96372 THER/PROPH/DIAG INJ SC/IM: CPT | Performed by: INTERNAL MEDICINE

## 2023-02-15 PROCEDURE — 1126F AMNT PAIN NOTED NONE PRSNT: CPT | Performed by: INTERNAL MEDICINE

## 2023-02-15 RX ORDER — TRIAMCINOLONE ACETONIDE 40 MG/ML
40 INJECTION, SUSPENSION INTRA-ARTICULAR; INTRAMUSCULAR ONCE
Status: COMPLETED | OUTPATIENT
Start: 2023-02-15 | End: 2023-02-15

## 2023-02-15 RX ADMIN — TRIAMCINOLONE ACETONIDE 40 MG: 40 INJECTION, SUSPENSION INTRA-ARTICULAR; INTRAMUSCULAR at 11:15:00

## 2023-02-27 ENCOUNTER — HOSPITAL ENCOUNTER (OUTPATIENT)
Dept: GENERAL RADIOLOGY | Facility: HOSPITAL | Age: 83
Discharge: HOME | End: 2023-02-27
Attending: INTERNAL MEDICINE
Payer: MEDICARE

## 2023-02-27 DIAGNOSIS — Z74.09 LIMITED MOBILITY: ICD-10-CM

## 2023-02-27 DIAGNOSIS — R52 PAIN: ICD-10-CM

## 2023-02-27 PROCEDURE — 73030 X-RAY EXAM OF SHOULDER: CPT | Performed by: INTERNAL MEDICINE

## 2023-03-08 ENCOUNTER — HOSPITAL ENCOUNTER (OUTPATIENT)
Dept: MRI IMAGING | Facility: HOSPITAL | Age: 83
Discharge: HOME | End: 2023-03-08
Attending: INTERNAL MEDICINE
Payer: MEDICARE

## 2023-03-08 DIAGNOSIS — R52 PAIN: ICD-10-CM

## 2023-03-08 PROCEDURE — 73221 MRI JOINT UPR EXTREM W/O DYE: CPT | Performed by: INTERNAL MEDICINE

## 2023-03-20 ENCOUNTER — HOSPITAL ENCOUNTER (OUTPATIENT)
Dept: ULTRASOUND IMAGING | Facility: HOSPITAL | Age: 83
Discharge: HOME | End: 2023-03-20
Attending: OBSTETRICS & GYNECOLOGY
Payer: MEDICARE

## 2023-03-20 DIAGNOSIS — R31.21 ASYMPTOMATIC MICROSCOPIC HEMATURIA: ICD-10-CM

## 2023-03-20 PROCEDURE — 76775 US EXAM ABDO BACK WALL LIM: CPT | Performed by: OBSTETRICS & GYNECOLOGY

## 2023-03-30 NOTE — PROGRESS NOTES
Emili Tripp is a 68year old female. HPI:   Pain dorsum of left first metatarsal and then entire dorsum of the left foot -  Started about 5 days ago and was very bad last night somewhat better this morning.  Had swelling dorsum left foot last night and • High blood pressure    • High cholesterol    • High level of uric acid in blood 2009    10.4   • Pulmonary emboli [facility]) May/June 2015   • Pulmonary embolism (HCC)    • Renal disorder    • Renal insufficiency 2009   • S/P colonoscopy 1/12/2015      Soci Quality 431: Preventive Care And Screening: Unhealthy Alcohol Use - Screening: Patient not identified as an unhealthy alcohol user when screened for unhealthy alcohol use using a systematic screening method Detail Level: Detailed Quality 226: Preventive Care And Screening: Tobacco Use: Screening And Cessation Intervention: Patient screened for tobacco use and is an ex/non-smoker Quality 111:Pneumonia Vaccination Status For Older Adults: Pneumococcal vaccine (PPSV23) was not administered on or after patient’s 60th birthday and before the end of the measurement period, reason not otherwise specified Quality 130: Documentation Of Current Medications In The Medical Record: Current Medications Documented

## 2023-03-31 ENCOUNTER — TELEPHONE (OUTPATIENT)
Dept: INTERNAL MEDICINE CLINIC | Facility: CLINIC | Age: 83
End: 2023-03-31

## 2023-03-31 NOTE — TELEPHONE ENCOUNTER
Pt has had a couple of dizzy spells  First one was last night when she got up to go to the bathroom - room was spinning  Pt had another brief one this morning when she woke up  Balance feels slightly off   - like she turned a little to fast     Blood pressure seems normal     Pt a little nervous and was hoping she could be seen today [phone]

## 2023-03-31 NOTE — TELEPHONE ENCOUNTER
Has had three dizzy spells today; each lasts about three seconds; also reports balance difficulty when walking; sxs suggest vertigo, though stroke not entirely excluded; since sxs are brief, favor vertigo; if sxs worsen, advise ER evaluation; pt called

## 2023-03-31 NOTE — TELEPHONE ENCOUNTER
To Dr Chris Calvillo on call -- Please Advise     Spoke with pt, reports at 2 AM she woke up with a dizzy spell lasting 5-6 seconds. States she had another episode while sitting on the couch this morning and a third one while at therapy sitting on the bench. Reports spells happen at random. BP this morning was normal, 137/75. Reports she feels \"off-balance. \" Denies fall. Pt has never experienced these symptoms before. She denies CP, SOB, N/V, blurred vision, weakness, facial drooping, headache. Speech is clear. Pt reports hx of stroke 3 years ago. Pt advised to report to the ER for symptoms. Pt states she will call her kids and discuss. Advised feelings of off balance and dizziness could be signs of a stroke. Pt requests MD advise.

## 2023-06-05 ENCOUNTER — OFFICE VISIT (OUTPATIENT)
Dept: INTERNAL MEDICINE CLINIC | Facility: CLINIC | Age: 83
End: 2023-06-05

## 2023-06-05 VITALS
HEIGHT: 62 IN | OXYGEN SATURATION: 96 % | BODY MASS INDEX: 29.44 KG/M2 | WEIGHT: 160 LBS | HEART RATE: 72 BPM | SYSTOLIC BLOOD PRESSURE: 128 MMHG | TEMPERATURE: 98 F | DIASTOLIC BLOOD PRESSURE: 86 MMHG

## 2023-06-05 DIAGNOSIS — I63.81 THALAMIC STROKE (HCC): ICD-10-CM

## 2023-06-05 DIAGNOSIS — Z01.818 PRE-OP EVALUATION: Primary | ICD-10-CM

## 2023-06-05 DIAGNOSIS — M25.511 ACUTE PAIN OF RIGHT SHOULDER: ICD-10-CM

## 2023-06-05 DIAGNOSIS — E78.00 HYPERCHOLESTEROLEMIA: ICD-10-CM

## 2023-06-05 DIAGNOSIS — I10 HYPERTENSION, BENIGN: ICD-10-CM

## 2023-06-05 DIAGNOSIS — N28.9 RENAL INSUFFICIENCY: ICD-10-CM

## 2023-06-05 PROBLEM — M75.121 NONTRAUMATIC COMPLETE TEAR OF RIGHT ROTATOR CUFF: Status: ACTIVE | Noted: 2023-06-05

## 2023-06-05 PROCEDURE — 99215 OFFICE O/P EST HI 40 MIN: CPT | Performed by: INTERNAL MEDICINE

## 2023-06-08 ENCOUNTER — LAB ENCOUNTER (OUTPATIENT)
Dept: LAB | Facility: HOSPITAL | Age: 83
End: 2023-06-08
Attending: INTERNAL MEDICINE
Payer: MEDICARE

## 2023-06-08 DIAGNOSIS — Z01.818 PRE-OP EVALUATION: ICD-10-CM

## 2023-06-08 DIAGNOSIS — E78.00 HYPERCHOLESTEROLEMIA: ICD-10-CM

## 2023-06-08 LAB
ALBUMIN SERPL-MCNC: 3.3 G/DL (ref 3.4–5)
ALBUMIN/GLOB SERPL: 1 {RATIO} (ref 1–2)
ALP LIVER SERPL-CCNC: 106 U/L
ALT SERPL-CCNC: 20 U/L
ANION GAP SERPL CALC-SCNC: 7 MMOL/L (ref 0–18)
AST SERPL-CCNC: 15 U/L (ref 15–37)
ATRIAL RATE: 69 BPM
BASOPHILS # BLD AUTO: 0.05 X10(3) UL (ref 0–0.2)
BASOPHILS NFR BLD AUTO: 0.7 %
BILIRUB SERPL-MCNC: 0.9 MG/DL (ref 0.1–2)
BUN BLD-MCNC: 18 MG/DL (ref 7–18)
BUN/CREAT SERPL: 16.7 (ref 10–20)
CALCIUM BLD-MCNC: 9.2 MG/DL (ref 8.5–10.1)
CHLORIDE SERPL-SCNC: 109 MMOL/L (ref 98–112)
CHOLEST SERPL-MCNC: 125 MG/DL (ref ?–200)
CO2 SERPL-SCNC: 26 MMOL/L (ref 21–32)
CREAT BLD-MCNC: 1.08 MG/DL
DEPRECATED RDW RBC AUTO: 42.8 FL (ref 35.1–46.3)
EOSINOPHIL # BLD AUTO: 0.33 X10(3) UL (ref 0–0.7)
EOSINOPHIL NFR BLD AUTO: 4.4 %
ERYTHROCYTE [DISTWIDTH] IN BLOOD BY AUTOMATED COUNT: 12.4 % (ref 11–15)
FASTING PATIENT LIPID ANSWER: YES
FASTING STATUS PATIENT QL REPORTED: YES
GFR SERPLBLD BASED ON 1.73 SQ M-ARVRAT: 52 ML/MIN/1.73M2 (ref 60–?)
GLOBULIN PLAS-MCNC: 3.4 G/DL (ref 2.8–4.4)
GLUCOSE BLD-MCNC: 111 MG/DL (ref 70–99)
HCT VFR BLD AUTO: 38.1 %
HDLC SERPL-MCNC: 62 MG/DL (ref 40–59)
HGB BLD-MCNC: 12.8 G/DL
IMM GRANULOCYTES # BLD AUTO: 0.03 X10(3) UL (ref 0–1)
IMM GRANULOCYTES NFR BLD: 0.4 %
LDLC SERPL CALC-MCNC: 51 MG/DL (ref ?–100)
LYMPHOCYTES # BLD AUTO: 2.38 X10(3) UL (ref 1–4)
LYMPHOCYTES NFR BLD AUTO: 32 %
MCH RBC QN AUTO: 31.7 PG (ref 26–34)
MCHC RBC AUTO-ENTMCNC: 33.6 G/DL (ref 31–37)
MCV RBC AUTO: 94.3 FL
MONOCYTES # BLD AUTO: 0.79 X10(3) UL (ref 0.1–1)
MONOCYTES NFR BLD AUTO: 10.6 %
NEUTROPHILS # BLD AUTO: 3.85 X10 (3) UL (ref 1.5–7.7)
NEUTROPHILS # BLD AUTO: 3.85 X10(3) UL (ref 1.5–7.7)
NEUTROPHILS NFR BLD AUTO: 51.9 %
NONHDLC SERPL-MCNC: 63 MG/DL (ref ?–130)
OSMOLALITY SERPL CALC.SUM OF ELEC: 297 MOSM/KG (ref 275–295)
P AXIS: 12 DEGREES
P-R INTERVAL: 134 MS
PLATELET # BLD AUTO: 293 10(3)UL (ref 150–450)
POTASSIUM SERPL-SCNC: 3.5 MMOL/L (ref 3.5–5.1)
PROT SERPL-MCNC: 6.7 G/DL (ref 6.4–8.2)
Q-T INTERVAL: 424 MS
QRS DURATION: 92 MS
QTC CALCULATION (BEZET): 454 MS
R AXIS: 26 DEGREES
RBC # BLD AUTO: 4.04 X10(6)UL
SODIUM SERPL-SCNC: 142 MMOL/L (ref 136–145)
T AXIS: 41 DEGREES
TRIGL SERPL-MCNC: 56 MG/DL (ref 30–149)
TSI SER-ACNC: 3.2 MIU/ML (ref 0.36–3.74)
VENTRICULAR RATE: 69 BPM
VLDLC SERPL CALC-MCNC: 8 MG/DL (ref 0–30)
WBC # BLD AUTO: 7.4 X10(3) UL (ref 4–11)

## 2023-06-08 PROCEDURE — 85025 COMPLETE CBC W/AUTO DIFF WBC: CPT

## 2023-06-08 PROCEDURE — 80053 COMPREHEN METABOLIC PANEL: CPT

## 2023-06-08 PROCEDURE — 36415 COLL VENOUS BLD VENIPUNCTURE: CPT

## 2023-06-08 PROCEDURE — 84443 ASSAY THYROID STIM HORMONE: CPT

## 2023-06-08 PROCEDURE — 93010 ELECTROCARDIOGRAM REPORT: CPT | Performed by: INTERNAL MEDICINE

## 2023-06-08 PROCEDURE — 80061 LIPID PANEL: CPT

## 2023-06-08 PROCEDURE — 93005 ELECTROCARDIOGRAM TRACING: CPT

## 2023-06-17 ENCOUNTER — TELEPHONE (OUTPATIENT)
Dept: INTERNAL MEDICINE CLINIC | Facility: CLINIC | Age: 83
End: 2023-06-17

## 2023-06-17 NOTE — TELEPHONE ENCOUNTER
Labs including CBC, CMP, lipids, thyroid all normal - continue same meds     Kidney function very good     EKG is normal

## 2023-07-12 RX ORDER — FAMOTIDINE 40 MG/1
TABLET, FILM COATED ORAL
Qty: 180 TABLET | Refills: 3 | Status: SHIPPED | OUTPATIENT
Start: 2023-07-12

## 2023-07-12 RX ORDER — NIFEDIPINE 60 MG/1
TABLET, EXTENDED RELEASE ORAL
Qty: 90 TABLET | Refills: 3 | Status: SHIPPED | OUTPATIENT
Start: 2023-07-12

## 2023-07-12 RX ORDER — METOPROLOL SUCCINATE 100 MG/1
TABLET, EXTENDED RELEASE ORAL
Qty: 90 TABLET | Refills: 3 | Status: SHIPPED | OUTPATIENT
Start: 2023-07-12

## 2023-07-12 NOTE — TELEPHONE ENCOUNTER
Refill request is for a maintenance medication and has met the criteria specified in the Ambulatory Medication Refill Standing Order for eligibility, visits, laboratory, alerts and was sent to the requested pharmacy.     Requested Prescriptions     Signed Prescriptions Disp Refills    FAMOTIDINE 40 MG Oral Tab 180 tablet 3     Sig: TAKE 1 TABLET(40 MG) BY MOUTH TWICE DAILY     Authorizing Provider: Estela Boss     Ordering User: JULIA MYRICK    METOPROLOL SUCCINATE  MG Oral Tablet 24 Hr 90 tablet 3     Sig: TAKE 1 TABLET(100 MG) BY MOUTH DAILY     Authorizing Provider: Estela Boss     Ordering User: JULIA MYRICK    NIFEDIPINE ER 60 MG Oral Tablet 24 Hr 90 tablet 3     Sig: TAKE 1 TABLET(60 MG) BY MOUTH DAILY     Authorizing Provider: Estela Boss     Ordering User: Allyn Elise

## 2023-07-13 RX ORDER — ATORVASTATIN CALCIUM 20 MG/1
20 TABLET, FILM COATED ORAL NIGHTLY
Qty: 90 TABLET | Refills: 3 | Status: SHIPPED | OUTPATIENT
Start: 2023-07-13

## 2023-07-13 NOTE — TELEPHONE ENCOUNTER
Refill request is for a maintenance medication and has met the criteria specified in the Ambulatory Medication Refill Standing Order for eligibility, visits, laboratory, alerts and was sent to the requested pharmacy. Requested Prescriptions     Signed Prescriptions Disp Refills    atorvastatin 20 MG Oral Tab 90 tablet 3     Sig: Take 1 tablet (20 mg total) by mouth nightly.      Authorizing Provider: Matilde Bumpers     Ordering User: Fauzia Mckeon

## 2023-07-14 ENCOUNTER — TELEPHONE (OUTPATIENT)
Dept: INTERNAL MEDICINE CLINIC | Facility: CLINIC | Age: 83
End: 2023-07-14

## 2023-07-14 NOTE — TELEPHONE ENCOUNTER
SEND THEM MY REVISED OV NOTE FROM [facility]. SHE IS MEDICALLY CLEARED.      STOP ASA 4-5 DAYS PRIOR TO SURGERY

## 2023-07-14 NOTE — TELEPHONE ENCOUNTER
Please call [facility]/Dr Kailey Bills office  Has patient been cleared for surgery, are we waiting on any results?   Opportunity for surgery on 7/18/23, (patient not contacted yet)  Patient had appt on 6/5/23 with Dr Cj Grey  Tasked to nursing

## 2023-07-14 NOTE — TELEPHONE ENCOUNTER
June 5 office note, labs, EKG faxed to Dr. Youssef Half office. Confirmation received. Dr. Char Del Castillo  Fax # [phone]  Fax # [phone]      Patient informed. Reminded her to stop ASA for 5 days prior to procedure.  Patient verbalized understanding

## 2023-07-14 NOTE — TELEPHONE ENCOUNTER
Spoke with Paola Mendez from Dr. Colleen Arita office informed her that pre-op clearance has not been completed  as of today. Informed Paola Mendez that patient will need to stop ASA for 5 days prior to surgery per Dr. Alondra Peterson note so patient may not qualify to move surgery up to 7/18. To Dr. Alondra Peterson is patient cleared for 07/28 vaginal hysterectomy?        Dr. Blair Mooney  Fax # [phone]  Fax # [phone]  Fax # [phone]

## 2023-07-19 ENCOUNTER — LAB ENCOUNTER (OUTPATIENT)
Dept: LAB | Facility: HOSPITAL | Age: 83
End: 2023-07-19
Attending: OBSTETRICS & GYNECOLOGY
Payer: MEDICARE

## 2023-07-19 DIAGNOSIS — Z01.818 PRE-OP TESTING: ICD-10-CM

## 2023-07-19 LAB
ANTIBODY SCREEN: NEGATIVE
RH BLOOD TYPE: NEGATIVE
RH BLOOD TYPE: NEGATIVE

## 2023-07-19 PROCEDURE — 36415 COLL VENOUS BLD VENIPUNCTURE: CPT

## 2023-07-19 PROCEDURE — 86850 RBC ANTIBODY SCREEN: CPT

## 2023-07-19 PROCEDURE — 86901 BLOOD TYPING SEROLOGIC RH(D): CPT

## 2023-07-19 PROCEDURE — 86900 BLOOD TYPING SEROLOGIC ABO: CPT

## 2023-07-24 ENCOUNTER — TELEPHONE (OUTPATIENT)
Dept: NEPHROLOGY | Facility: CLINIC | Age: 83
End: 2023-07-24

## 2023-07-24 NOTE — TELEPHONE ENCOUNTER
Pt states that she is scheduled for a bladder reconstruction surgery on 7/28/23. She states that she will have to take Miralax for 12 weeks. She wants to make sure that is ok with Dr James Mcclelland. Please call.

## 2023-07-24 NOTE — H&P
[facility]    History and Physical    Meryl Razo Patient Status:  Hospital Outpatient Surgery    10/18/1940 MRN [id number]   Location [place] [address] Attending Jared Denny MD   [facility] Day # 0 PCP Dereck Reich. MD Adrienne     Date:  2023  Date of Admission: 23    History provided by:patient  HPI:   No chief complaint on file. HPI  [de-identified] female presents 25 for surgical treatment of Stage II UVP, USI, Chronic Cystitis, and AMH. Patient has been using a pessary but is very bothered by the amount of vaginal discharge she is experiencing with use of the pessary. When she doesn't use the pessary, the protrusion is \"huge\" and bothersome. Patient is not sexually active and has no intentions on becoming active in the future. Patient is aware of her treatment options and has opted for surgical treatment. Renal ultrasound showed bilateral cysts, no masses, no hydronephrosis. DVT in past, treated with anticoagulation x 6mo, no longer taking it.       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[facility])      Past Surgical History:   Procedure Laterality Date    COLONOSCOPY  1/15, 11/10,     COLONOSCOPY  2019    COLONOSCOPY N/A 2019    Procedure: COLONOSCOPY;  Surgeon: Tiarra Wilhelm MD;  Location: [address] [place] [address] ENDOSCOPY    EGD  , , ,     INJ TENDON/LIGAMENT/CYST      Injection Tendon Sheath, Ligament     Family History   Problem Relation Age of Onset    Cancer Mother 47        lymphoma    Cancer Father         lung cancer    Hypertension Father Glaucoma Father     Heart Disease Father         CAD    Diabetes Father     Other (Other) Paternal Grandmother         bunions    Cancer Maternal Grandfather         gastric cancer    Breast Cancer Paternal Aunt         63's     Social History:  Social History     Socioeconomic History    Marital status:    Tobacco Use    Smoking status: Never     Passive exposure: Never    Smokeless tobacco: Never   Vaping Use    Vaping Use: Never used   Substance and Sexual Activity    Alcohol use: No    Drug use: No   Other Topics Concern    Caffeine Concern No     Allergies/Medications: Allergies:   Ibuprofen               OTHER (SEE COMMENTS)    Comment:Kidney problems  Nsaids                  OTHER (SEE COMMENTS)    Comment:Kidney problems  Radiology Contrast *    OTHER (SEE COMMENTS)    Comment:Kidney problems  Azithromycin            DIARRHEA  No medications prior to admission. Review of Systems:     Constitutional: Negative. HENT: Negative. Eyes: Negative. Respiratory: Negative. Cardiovascular: Negative. Gastrointestinal: Negative. Endocrine: Negative. Genitourinary:  Positive for bladder incontinence. Musculoskeletal: Negative. Allergic/Immunologic: Negative. Neurological: Negative. Hematological: Negative. Psychiatric/Behavioral: Negative. Physical Exam:   Vital Signs:  Height 5' 2\" (1.575 m), weight 155 lb (70.3 kg), not currently breastfeeding. Physical Exam  Constitutional:       Appearance: Normal appearance. Comments: BMI 28.3   HENT:      Head: Normocephalic. Cardiovascular:      Rate and Rhythm: Normal rate. Pulmonary:      Effort: Pulmonary effort is normal.   Abdominal:      General: Abdomen is flat. Palpations: Abdomen is soft. Genitourinary:     General: Normal vulva. Comments: POP-Q: -1/+1/0, 3/3/10, -2/-2/-3. Musculoskeletal:         General: Normal range of motion. Cervical back: Normal range of motion.    Skin: General: Skin is warm and dry. Neurological:      General: No focal deficit present. Mental Status: She is alert and oriented to person, place, and time. Psychiatric:         Mood and Affect: Mood normal.         Behavior: Behavior normal.       Cervical Papanicolaou to be done in MD's office    Results:     Lab Results   Component Value Date    WBC 7.4 06/08/2023    HGB 12.8 06/08/2023    HCT 38.1 06/08/2023    .0 06/08/2023    CREATSERUM 1.08 (H) 06/08/2023    BUN 18 06/08/2023     06/08/2023    K 3.5 06/08/2023     06/08/2023    CO2 26.0 06/08/2023     (H) 06/08/2023    CA 9.2 06/08/2023    ALB 3.3 (L) 06/08/2023    ALKPHO 106 06/08/2023    BILT 0.9 06/08/2023    TP 6.7 06/08/2023    AST 15 06/08/2023    ALT 20 06/08/2023    INR 3.0 (A) 12/16/2015    TSH 3.200 06/08/2023    DDIMER 0.59 10/01/2018    MG 1.9 09/19/2017    PHOS 3.8 12/14/2021    TROP 0.00 10/01/2018     12/05/2017     No results found. Assessment/Plan:   1) Stage II UVP  2) USI  3) AMH  4) Chronic Cystitis  5) CKD, HTN, DVT/PE, Stroke    Surgical Plan:  TVH, Colpectomy, Midurethral Sling, Enterocele/Cystocele/Rectocele Repair, Cystoscopy.   Ancef surgical prophylaxis  Anticipate outpatient surgery  NSAID allergy  Tylenol, Norco for postop pain management (Rx provided prior to admission)  Postop f/u appt scheduled for 8/4/23                    Ayad Caraballo MD  7/24/2023

## 2023-07-28 ENCOUNTER — ANESTHESIA (OUTPATIENT)
Dept: SURGERY | Facility: HOSPITAL | Age: 83
DRG: 743 | End: 2023-07-28
Payer: MEDICARE

## 2023-07-28 ENCOUNTER — HOSPITAL ENCOUNTER (INPATIENT)
Facility: HOSPITAL | Age: 83
LOS: 1 days | Discharge: HOME | DRG: 743 | End: 2023-07-28
Attending: OBSTETRICS & GYNECOLOGY | Admitting: OBSTETRICS & GYNECOLOGY
Payer: MEDICARE

## 2023-07-28 ENCOUNTER — ANESTHESIA EVENT (OUTPATIENT)
Dept: SURGERY | Facility: HOSPITAL | Age: 83
DRG: 743 | End: 2023-07-28
Payer: MEDICARE

## 2023-07-28 VITALS
OXYGEN SATURATION: 94 % | HEART RATE: 60 BPM | RESPIRATION RATE: 16 BRPM | DIASTOLIC BLOOD PRESSURE: 61 MMHG | WEIGHT: 155 LBS | HEIGHT: 62 IN | BODY MASS INDEX: 28.52 KG/M2 | TEMPERATURE: 98 F | SYSTOLIC BLOOD PRESSURE: 119 MMHG

## 2023-07-28 DIAGNOSIS — Z01.818 PRE-OP TESTING: Primary | ICD-10-CM

## 2023-07-28 PROCEDURE — 0UT97ZZ RESECTION OF UTERUS, VIA NATURAL OR ARTIFICIAL OPENING: ICD-10-PCS | Performed by: OBSTETRICS & GYNECOLOGY

## 2023-07-28 PROCEDURE — 0WQNXZZ REPAIR FEMALE PERINEUM, EXTERNAL APPROACH: ICD-10-PCS | Performed by: OBSTETRICS & GYNECOLOGY

## 2023-07-28 PROCEDURE — 0TJB8ZZ INSPECTION OF BLADDER, VIA NATURAL OR ARTIFICIAL OPENING ENDOSCOPIC: ICD-10-PCS | Performed by: OBSTETRICS & GYNECOLOGY

## 2023-07-28 PROCEDURE — 88302 TISSUE EXAM BY PATHOLOGIST: CPT | Performed by: OBSTETRICS & GYNECOLOGY

## 2023-07-28 PROCEDURE — 88307 TISSUE EXAM BY PATHOLOGIST: CPT | Performed by: OBSTETRICS & GYNECOLOGY

## 2023-07-28 PROCEDURE — 0TSD0ZZ REPOSITION URETHRA, OPEN APPROACH: ICD-10-PCS | Performed by: OBSTETRICS & GYNECOLOGY

## 2023-07-28 PROCEDURE — 0UQGXZZ REPAIR VAGINA, EXTERNAL APPROACH: ICD-10-PCS | Performed by: OBSTETRICS & GYNECOLOGY

## 2023-07-28 PROCEDURE — 0JQC0ZZ REPAIR PELVIC REGION SUBCUTANEOUS TISSUE AND FASCIA, OPEN APPROACH: ICD-10-PCS | Performed by: OBSTETRICS & GYNECOLOGY

## 2023-07-28 DEVICE — SIS SYSTEM
Type: IMPLANTABLE DEVICE | Status: FUNCTIONAL
Brand: SOLYX™ BLUE

## 2023-07-28 RX ORDER — MORPHINE SULFATE 10 MG/ML
6 INJECTION, SOLUTION INTRAMUSCULAR; INTRAVENOUS EVERY 10 MIN PRN
Status: DISCONTINUED | OUTPATIENT
Start: 2023-07-28 | End: 2023-07-28

## 2023-07-28 RX ORDER — LIDOCAINE HYDROCHLORIDE 10 MG/ML
INJECTION, SOLUTION EPIDURAL; INFILTRATION; INTRACAUDAL; PERINEURAL AS NEEDED
Status: DISCONTINUED | OUTPATIENT
Start: 2023-07-28 | End: 2023-07-28 | Stop reason: SURG

## 2023-07-28 RX ORDER — METOCLOPRAMIDE HYDROCHLORIDE 5 MG/ML
10 INJECTION INTRAMUSCULAR; INTRAVENOUS EVERY 8 HOURS PRN
Status: DISCONTINUED | OUTPATIENT
Start: 2023-07-28 | End: 2023-07-28

## 2023-07-28 RX ORDER — CEFAZOLIN SODIUM/WATER 2 G/20 ML
2 SYRINGE (ML) INTRAVENOUS ONCE
Status: COMPLETED | OUTPATIENT
Start: 2023-07-28 | End: 2023-07-28

## 2023-07-28 RX ORDER — HYDROMORPHONE HYDROCHLORIDE 1 MG/ML
0.6 INJECTION, SOLUTION INTRAMUSCULAR; INTRAVENOUS; SUBCUTANEOUS EVERY 5 MIN PRN
Status: DISCONTINUED | OUTPATIENT
Start: 2023-07-28 | End: 2023-07-28

## 2023-07-28 RX ORDER — ROCURONIUM BROMIDE 10 MG/ML
INJECTION, SOLUTION INTRAVENOUS AS NEEDED
Status: DISCONTINUED | OUTPATIENT
Start: 2023-07-28 | End: 2023-07-28 | Stop reason: SURG

## 2023-07-28 RX ORDER — SODIUM CHLORIDE, SODIUM LACTATE, POTASSIUM CHLORIDE, CALCIUM CHLORIDE 600; 310; 30; 20 MG/100ML; MG/100ML; MG/100ML; MG/100ML
INJECTION, SOLUTION INTRAVENOUS CONTINUOUS
Status: DISCONTINUED | OUTPATIENT
Start: 2023-07-28 | End: 2023-07-28

## 2023-07-28 RX ORDER — ONDANSETRON 2 MG/ML
4 INJECTION INTRAMUSCULAR; INTRAVENOUS EVERY 6 HOURS PRN
Status: DISCONTINUED | OUTPATIENT
Start: 2023-07-28 | End: 2023-07-28

## 2023-07-28 RX ORDER — EPHEDRINE SULFATE 50 MG/ML
INJECTION, SOLUTION INTRAVENOUS AS NEEDED
Status: DISCONTINUED | OUTPATIENT
Start: 2023-07-28 | End: 2023-07-28 | Stop reason: SURG

## 2023-07-28 RX ORDER — NALOXONE HYDROCHLORIDE 0.4 MG/ML
80 INJECTION, SOLUTION INTRAMUSCULAR; INTRAVENOUS; SUBCUTANEOUS AS NEEDED
Status: DISCONTINUED | OUTPATIENT
Start: 2023-07-28 | End: 2023-07-28

## 2023-07-28 RX ORDER — ACETAMINOPHEN 500 MG
1000 TABLET ORAL ONCE
Status: DISCONTINUED | OUTPATIENT
Start: 2023-07-28 | End: 2023-07-28 | Stop reason: HOSPADM

## 2023-07-28 RX ORDER — HYDROMORPHONE HYDROCHLORIDE 1 MG/ML
0.4 INJECTION, SOLUTION INTRAMUSCULAR; INTRAVENOUS; SUBCUTANEOUS EVERY 5 MIN PRN
Status: DISCONTINUED | OUTPATIENT
Start: 2023-07-28 | End: 2023-07-28

## 2023-07-28 RX ORDER — MORPHINE SULFATE 4 MG/ML
4 INJECTION, SOLUTION INTRAMUSCULAR; INTRAVENOUS EVERY 10 MIN PRN
Status: DISCONTINUED | OUTPATIENT
Start: 2023-07-28 | End: 2023-07-28

## 2023-07-28 RX ORDER — ONDANSETRON 2 MG/ML
INJECTION INTRAMUSCULAR; INTRAVENOUS AS NEEDED
Status: DISCONTINUED | OUTPATIENT
Start: 2023-07-28 | End: 2023-07-28 | Stop reason: SURG

## 2023-07-28 RX ORDER — METOPROLOL TARTRATE 5 MG/5ML
2.5 INJECTION INTRAVENOUS ONCE
Status: DISCONTINUED | OUTPATIENT
Start: 2023-07-28 | End: 2023-07-28

## 2023-07-28 RX ORDER — DEXAMETHASONE SODIUM PHOSPHATE 4 MG/ML
VIAL (ML) INJECTION AS NEEDED
Status: DISCONTINUED | OUTPATIENT
Start: 2023-07-28 | End: 2023-07-28 | Stop reason: SURG

## 2023-07-28 RX ORDER — MORPHINE SULFATE 4 MG/ML
2 INJECTION, SOLUTION INTRAMUSCULAR; INTRAVENOUS EVERY 10 MIN PRN
Status: DISCONTINUED | OUTPATIENT
Start: 2023-07-28 | End: 2023-07-28

## 2023-07-28 RX ORDER — HYDROMORPHONE HYDROCHLORIDE 1 MG/ML
0.2 INJECTION, SOLUTION INTRAMUSCULAR; INTRAVENOUS; SUBCUTANEOUS EVERY 5 MIN PRN
Status: DISCONTINUED | OUTPATIENT
Start: 2023-07-28 | End: 2023-07-28

## 2023-07-28 RX ADMIN — SODIUM CHLORIDE, SODIUM LACTATE, POTASSIUM CHLORIDE, CALCIUM CHLORIDE: 600; 310; 30; 20 INJECTION, SOLUTION INTRAVENOUS at 11:53:00

## 2023-07-28 RX ADMIN — DEXAMETHASONE SODIUM PHOSPHATE 4 MG: 4 MG/ML VIAL (ML) INJECTION at 11:53:00

## 2023-07-28 RX ADMIN — SODIUM CHLORIDE, SODIUM LACTATE, POTASSIUM CHLORIDE, CALCIUM CHLORIDE: 600; 310; 30; 20 INJECTION, SOLUTION INTRAVENOUS at 14:05:00

## 2023-07-28 RX ADMIN — ROCURONIUM BROMIDE 30 MG: 10 INJECTION, SOLUTION INTRAVENOUS at 12:09:00

## 2023-07-28 RX ADMIN — EPHEDRINE SULFATE 5 MG: 50 INJECTION, SOLUTION INTRAVENOUS at 13:06:00

## 2023-07-28 RX ADMIN — CEFAZOLIN SODIUM/WATER 2 G: 2 G/20 ML SYRINGE (ML) INTRAVENOUS at 12:02:00

## 2023-07-28 RX ADMIN — SODIUM CHLORIDE, SODIUM LACTATE, POTASSIUM CHLORIDE, CALCIUM CHLORIDE: 600; 310; 30; 20 INJECTION, SOLUTION INTRAVENOUS at 13:19:00

## 2023-07-28 RX ADMIN — ONDANSETRON 4 MG: 2 INJECTION INTRAMUSCULAR; INTRAVENOUS at 11:53:00

## 2023-07-28 RX ADMIN — SODIUM CHLORIDE, SODIUM LACTATE, POTASSIUM CHLORIDE, CALCIUM CHLORIDE: 600; 310; 30; 20 INJECTION, SOLUTION INTRAVENOUS at 13:07:00

## 2023-07-28 RX ADMIN — LIDOCAINE HYDROCHLORIDE 50 MG: 10 INJECTION, SOLUTION EPIDURAL; INFILTRATION; INTRACAUDAL; PERINEURAL at 11:53:00

## 2023-07-28 NOTE — ANESTHESIA PROCEDURE NOTES
Airway  Date/Time: 7/28/2023 11:58 AM  Urgency: elective    Airway not difficult    General Information and Staff    Patient location during procedure: OR  Anesthesiologist: Mariella Goodman MD  Resident/CRNA: Stefanie Talavera CRNA  Performed: CRNA   Performed by: Stefanie Talavera CRNA  Authorized by: Mariella Goodman MD      Indications and Patient Condition  Indications for airway management: airway protection and anesthesia  Preoxygenated: yes  Mask difficulty assessment: 0 - not attempted    Final Airway Details  Final airway type: endotracheal airway      Successful airway: ETT  Cuffed: yes   Successful intubation technique: direct laryngoscopy  Facilitating devices/methods: intubating stylet  Blade: Charanjit  Blade size: #3  ETT size (mm): 7.0    Cormack-Lehane Classification: grade I - full view of glottis  Number of attempts at approach: 1

## 2023-07-28 NOTE — OR NURSING
Pt was instilled with 300 ml of sterile water through thomas catheter. Pt tolerated well. Pt was ambulatory with steady gait to the washroom. Pt voided 300ml of clear, but bloody urine. Pt denies any urgency, pain or feeling of fullness.

## 2023-07-28 NOTE — INTERVAL H&P NOTE
Pre-op Diagnosis: Uterovaginal prolapse, incomplete, stress incontinence    The above referenced H&P was reviewed by Amos Opitz, MD on 7/28/2023, the patient was examined and no significant changes have occurred in the patient's condition since the H&P was performed. I discussed with the patient and/or legal representative the potential benefits, risks and side effects of this procedure; the likelihood of the patient achieving goals; and potential problems that might occur during recuperation. I discussed reasonable alternatives to the procedure, including risks, benefits and side effects related to the alternatives and risks related to not receiving this procedure. We will proceed with procedure as planned. Patient expressed strong desire for same day surgery. We agreed that she may go home today as long as she meets same day surgery criteria.

## 2023-07-31 ENCOUNTER — PATIENT OUTREACH (OUTPATIENT)
Dept: CASE MANAGEMENT | Age: 83
End: 2023-07-31

## 2023-08-30 ENCOUNTER — LAB ENCOUNTER (OUTPATIENT)
Dept: LAB | Facility: HOSPITAL | Age: 83
End: 2023-08-30
Attending: INTERNAL MEDICINE
Payer: MEDICARE

## 2023-08-30 ENCOUNTER — OFFICE VISIT (OUTPATIENT)
Facility: CLINIC | Age: 83
End: 2023-08-30

## 2023-08-30 VITALS
SYSTOLIC BLOOD PRESSURE: 120 MMHG | WEIGHT: 159 LBS | BODY MASS INDEX: 29 KG/M2 | HEART RATE: 60 BPM | DIASTOLIC BLOOD PRESSURE: 85 MMHG

## 2023-08-30 DIAGNOSIS — R31.29 MICROSCOPIC HEMATURIA: ICD-10-CM

## 2023-08-30 DIAGNOSIS — N18.30 STAGE 3 CHRONIC KIDNEY DISEASE, UNSPECIFIED WHETHER STAGE 3A OR 3B CKD (HCC): Primary | ICD-10-CM

## 2023-08-30 DIAGNOSIS — N25.81 SECONDARY HYPERPARATHYROIDISM (HCC): ICD-10-CM

## 2023-08-30 DIAGNOSIS — E87.6 HYPOKALEMIA: ICD-10-CM

## 2023-08-30 DIAGNOSIS — N18.30 HYPERTENSIVE KIDNEY DISEASE WITH STAGE 3 CHRONIC KIDNEY DISEASE, UNSPECIFIED WHETHER STAGE 3A OR 3B CKD (HCC): ICD-10-CM

## 2023-08-30 DIAGNOSIS — N18.30 STAGE 3 CHRONIC KIDNEY DISEASE, UNSPECIFIED WHETHER STAGE 3A OR 3B CKD (HCC): ICD-10-CM

## 2023-08-30 DIAGNOSIS — I12.9 HYPERTENSIVE KIDNEY DISEASE WITH STAGE 3 CHRONIC KIDNEY DISEASE, UNSPECIFIED WHETHER STAGE 3A OR 3B CKD (HCC): ICD-10-CM

## 2023-08-30 LAB
ALBUMIN SERPL-MCNC: 3.3 G/DL (ref 3.4–5)
ANION GAP SERPL CALC-SCNC: 7 MMOL/L (ref 0–18)
BASOPHILS # BLD AUTO: 0.05 X10(3) UL (ref 0–0.2)
BASOPHILS NFR BLD AUTO: 0.6 %
BILIRUB UR QL: NEGATIVE
BUN BLD-MCNC: 22 MG/DL (ref 7–18)
BUN/CREAT SERPL: 17.7 (ref 10–20)
CALCIUM BLD-MCNC: 8.7 MG/DL (ref 8.5–10.1)
CHLORIDE SERPL-SCNC: 109 MMOL/L (ref 98–112)
CO2 SERPL-SCNC: 25 MMOL/L (ref 21–32)
COLOR UR: YELLOW
CREAT BLD-MCNC: 1.24 MG/DL
CREAT UR-SCNC: 448 MG/DL
DEPRECATED RDW RBC AUTO: 44.3 FL (ref 35.1–46.3)
EGFRCR SERPLBLD CKD-EPI 2021: 44 ML/MIN/1.73M2 (ref 60–?)
EOSINOPHIL # BLD AUTO: 0.39 X10(3) UL (ref 0–0.7)
EOSINOPHIL NFR BLD AUTO: 4.5 %
ERYTHROCYTE [DISTWIDTH] IN BLOOD BY AUTOMATED COUNT: 12.9 % (ref 11–15)
GLUCOSE BLD-MCNC: 85 MG/DL (ref 70–99)
GLUCOSE UR-MCNC: 30 MG/DL
HCT VFR BLD AUTO: 37.7 %
HGB BLD-MCNC: 12.6 G/DL
HYALINE CASTS #/AREA URNS AUTO: PRESENT /LPF
IMM GRANULOCYTES # BLD AUTO: 0.02 X10(3) UL (ref 0–1)
IMM GRANULOCYTES NFR BLD: 0.2 %
KETONES UR-MCNC: NEGATIVE MG/DL
LEUKOCYTE ESTERASE UR QL STRIP.AUTO: 500
LYMPHOCYTES # BLD AUTO: 2.06 X10(3) UL (ref 1–4)
LYMPHOCYTES NFR BLD AUTO: 23.9 %
MCH RBC QN AUTO: 31.1 PG (ref 26–34)
MCHC RBC AUTO-ENTMCNC: 33.4 G/DL (ref 31–37)
MCV RBC AUTO: 93.1 FL
MONOCYTES # BLD AUTO: 0.93 X10(3) UL (ref 0.1–1)
MONOCYTES NFR BLD AUTO: 10.8 %
NEUTROPHILS # BLD AUTO: 5.16 X10 (3) UL (ref 1.5–7.7)
NEUTROPHILS # BLD AUTO: 5.16 X10(3) UL (ref 1.5–7.7)
NEUTROPHILS NFR BLD AUTO: 60 %
NITRITE UR QL STRIP.AUTO: NEGATIVE
OSMOLALITY SERPL CALC.SUM OF ELEC: 295 MOSM/KG (ref 275–295)
PH UR: 5.5 [PH] (ref 5–8)
PHOSPHATE SERPL-MCNC: 2.8 MG/DL (ref 2.5–4.9)
PLATELET # BLD AUTO: 316 10(3)UL (ref 150–450)
POTASSIUM SERPL-SCNC: 3.6 MMOL/L (ref 3.5–5.1)
PROT UR-MCNC: 50 MG/DL
PROT UR-MCNC: 54.8 MG/DL
PROT/CREAT UR-RTO: 0.12
PTH-INTACT SERPL-MCNC: 99.5 PG/ML (ref 18.5–88)
RBC # BLD AUTO: 4.05 X10(6)UL
RBC #/AREA URNS AUTO: >10 /HPF
SODIUM SERPL-SCNC: 141 MMOL/L (ref 136–145)
SP GR UR STRIP: >1.03 (ref 1–1.03)
UROBILINOGEN UR STRIP-ACNC: NORMAL
WBC # BLD AUTO: 8.6 X10(3) UL (ref 4–11)
WBC #/AREA URNS AUTO: >50 /HPF

## 2023-08-30 PROCEDURE — 99214 OFFICE O/P EST MOD 30 MIN: CPT | Performed by: INTERNAL MEDICINE

## 2023-08-30 PROCEDURE — 80069 RENAL FUNCTION PANEL: CPT

## 2023-08-30 PROCEDURE — 81001 URINALYSIS AUTO W/SCOPE: CPT

## 2023-08-30 PROCEDURE — 87086 URINE CULTURE/COLONY COUNT: CPT

## 2023-08-30 PROCEDURE — 84156 ASSAY OF PROTEIN URINE: CPT

## 2023-08-30 PROCEDURE — 1126F AMNT PAIN NOTED NONE PRSNT: CPT | Performed by: INTERNAL MEDICINE

## 2023-08-30 PROCEDURE — 85025 COMPLETE CBC W/AUTO DIFF WBC: CPT

## 2023-08-30 PROCEDURE — 36415 COLL VENOUS BLD VENIPUNCTURE: CPT

## 2023-08-30 PROCEDURE — 83970 ASSAY OF PARATHORMONE: CPT

## 2023-08-30 PROCEDURE — 82570 ASSAY OF URINE CREATININE: CPT

## 2023-08-31 ENCOUNTER — TELEPHONE (OUTPATIENT)
Facility: CLINIC | Age: 83
End: 2023-08-31

## 2023-08-31 DIAGNOSIS — N18.30 STAGE 3 CHRONIC KIDNEY DISEASE, UNSPECIFIED WHETHER STAGE 3A OR 3B CKD (HCC): Primary | ICD-10-CM

## 2023-09-07 ENCOUNTER — TELEPHONE (OUTPATIENT)
Facility: CLINIC | Age: 83
End: 2023-09-07

## 2023-09-07 DIAGNOSIS — N18.30 STAGE 3 CHRONIC KIDNEY DISEASE, UNSPECIFIED WHETHER STAGE 3A OR 3B CKD (HCC): Primary | ICD-10-CM

## 2023-11-14 NOTE — TELEPHONE ENCOUNTER
Pt called to check status on refill requested    Pt has a rash that she does not want to get out of hand - Nystatin usually clears this up for her

## 2023-11-15 RX ORDER — NYSTATIN 100000 U/G
OINTMENT TOPICAL
Qty: 30 G | Refills: 1 | Status: SHIPPED | OUTPATIENT
Start: 2023-11-15

## 2023-11-15 NOTE — TELEPHONE ENCOUNTER
Refill request is for a maintenance medication and has met the criteria specified in the Ambulatory Medication Refill Standing Order for eligibility, visits, laboratory, alerts and was sent to the requested pharmacy. Requested Prescriptions     Signed Prescriptions Disp Refills    nystatin 100,000 Units/g External Ointment 30 g 1     Sig: APPLY SMALL AMOUNT TO THE AFFECTED AREA 2 TO 3 TIMES DAILY.  AS NEEDED     Authorizing Provider: Chantell Wasserman     Ordering User: Evelyn London

## 2023-12-08 ENCOUNTER — TELEPHONE (OUTPATIENT)
Dept: INTERNAL MEDICINE CLINIC | Facility: CLINIC | Age: 83
End: 2023-12-08

## 2023-12-08 DIAGNOSIS — E78.00 HYPERCHOLESTEROLEMIA: Primary | ICD-10-CM

## 2023-12-08 DIAGNOSIS — I10 HYPERTENSION, BENIGN: ICD-10-CM

## 2023-12-08 NOTE — TELEPHONE ENCOUNTER
Patient scheduled check up with Dr Alondra Peterson on Jan 30, 2024 (soonest available)    Pt advises she is due for check up, pt has kidney issues & has concerns about leg swelling that she deals with & dry mouth    Asks if labs could be ordered that she would do now to see where she's at with her kidney issues     Please call pt to advise/confirm orders  [facility] for next week when Dr Alondra Peterson is in

## 2023-12-12 ENCOUNTER — OFFICE VISIT (OUTPATIENT)
Dept: INTERNAL MEDICINE CLINIC | Facility: CLINIC | Age: 83
End: 2023-12-12

## 2023-12-12 ENCOUNTER — LAB ENCOUNTER (OUTPATIENT)
Dept: LAB | Facility: HOSPITAL | Age: 83
End: 2023-12-12
Attending: INTERNAL MEDICINE
Payer: MEDICARE

## 2023-12-12 VITALS
HEIGHT: 62 IN | WEIGHT: 165 LBS | DIASTOLIC BLOOD PRESSURE: 70 MMHG | TEMPERATURE: 98 F | BODY MASS INDEX: 30.36 KG/M2 | HEART RATE: 68 BPM | SYSTOLIC BLOOD PRESSURE: 120 MMHG | OXYGEN SATURATION: 96 %

## 2023-12-12 DIAGNOSIS — Z78.0 POSTMENOPAUSAL: ICD-10-CM

## 2023-12-12 DIAGNOSIS — I10 HYPERTENSION, BENIGN: ICD-10-CM

## 2023-12-12 DIAGNOSIS — N28.9 RENAL INSUFFICIENCY: ICD-10-CM

## 2023-12-12 DIAGNOSIS — E78.00 HYPERCHOLESTEROLEMIA: ICD-10-CM

## 2023-12-12 DIAGNOSIS — K22.719 BARRETT'S ESOPHAGUS WITH DYSPLASIA: ICD-10-CM

## 2023-12-12 DIAGNOSIS — Z12.31 ENCOUNTER FOR SCREENING MAMMOGRAM FOR HIGH-RISK PATIENT: ICD-10-CM

## 2023-12-12 DIAGNOSIS — I63.81 THALAMIC STROKE (HCC): Primary | ICD-10-CM

## 2023-12-12 LAB
ALBUMIN SERPL-MCNC: 4 G/DL (ref 3.2–4.8)
ALBUMIN/GLOB SERPL: 1.5 {RATIO} (ref 1–2)
ALP LIVER SERPL-CCNC: 121 U/L
ALT SERPL-CCNC: 12 U/L
ANION GAP SERPL CALC-SCNC: 8 MMOL/L (ref 0–18)
AST SERPL-CCNC: 19 U/L (ref ?–34)
BILIRUB SERPL-MCNC: 0.8 MG/DL (ref 0.2–1.1)
BUN BLD-MCNC: 22 MG/DL (ref 9–23)
BUN/CREAT SERPL: 19.5 (ref 10–20)
CALCIUM BLD-MCNC: 9.2 MG/DL (ref 8.7–10.4)
CHLORIDE SERPL-SCNC: 106 MMOL/L (ref 98–112)
CHOLEST SERPL-MCNC: 138 MG/DL (ref ?–200)
CO2 SERPL-SCNC: 26 MMOL/L (ref 21–32)
CREAT BLD-MCNC: 1.13 MG/DL
EGFRCR SERPLBLD CKD-EPI 2021: 49 ML/MIN/1.73M2 (ref 60–?)
FASTING PATIENT LIPID ANSWER: YES
FASTING STATUS PATIENT QL REPORTED: YES
GLOBULIN PLAS-MCNC: 2.7 G/DL (ref 2.8–4.4)
GLUCOSE BLD-MCNC: 103 MG/DL (ref 70–99)
HDLC SERPL-MCNC: 63 MG/DL (ref 40–59)
LDLC SERPL CALC-MCNC: 62 MG/DL (ref ?–100)
NONHDLC SERPL-MCNC: 75 MG/DL (ref ?–130)
OSMOLALITY SERPL CALC.SUM OF ELEC: 294 MOSM/KG (ref 275–295)
POTASSIUM SERPL-SCNC: 3.4 MMOL/L (ref 3.5–5.1)
PROT SERPL-MCNC: 6.7 G/DL (ref 5.7–8.2)
SODIUM SERPL-SCNC: 140 MMOL/L (ref 136–145)
T4 FREE SERPL-MCNC: 1.1 NG/DL (ref 0.8–1.7)
TRIGL SERPL-MCNC: 63 MG/DL (ref 30–149)
TSI SER-ACNC: 4.9 MIU/ML (ref 0.55–4.78)
VLDLC SERPL CALC-MCNC: 9 MG/DL (ref 0–30)

## 2023-12-12 PROCEDURE — 1126F AMNT PAIN NOTED NONE PRSNT: CPT | Performed by: INTERNAL MEDICINE

## 2023-12-12 PROCEDURE — 84443 ASSAY THYROID STIM HORMONE: CPT

## 2023-12-12 PROCEDURE — 80061 LIPID PANEL: CPT

## 2023-12-12 PROCEDURE — 99214 OFFICE O/P EST MOD 30 MIN: CPT | Performed by: INTERNAL MEDICINE

## 2023-12-12 PROCEDURE — 84439 ASSAY OF FREE THYROXINE: CPT

## 2023-12-12 PROCEDURE — 36415 COLL VENOUS BLD VENIPUNCTURE: CPT

## 2023-12-12 PROCEDURE — 80053 COMPREHEN METABOLIC PANEL: CPT

## 2023-12-17 ENCOUNTER — TELEPHONE (OUTPATIENT)
Dept: INTERNAL MEDICINE CLINIC | Facility: CLINIC | Age: 83
End: 2023-12-17

## 2024-01-10 ENCOUNTER — TELEPHONE (OUTPATIENT)
Dept: INTERNAL MEDICINE CLINIC | Facility: CLINIC | Age: 84
End: 2024-01-10

## 2024-01-10 DIAGNOSIS — Z12.31 ENCOUNTER FOR SCREENING MAMMOGRAM FOR BREAST CANCER: ICD-10-CM

## 2024-01-10 DIAGNOSIS — R92.30 DENSE BREAST: ICD-10-CM

## 2024-01-10 DIAGNOSIS — Z78.0 ASYMPTOMATIC UNCOMPLICATED MENOPAUSE: Primary | ICD-10-CM

## 2024-01-10 NOTE — TELEPHONE ENCOUNTER
New orders for DEXA and mammogram entered per protocol  Called Rasheeda / Central scheduling and left message that new orders were entered - left on confidential VM   Tests already scheduled , coul dnot remove wrong DX codes

## 2024-01-10 NOTE — TELEPHONE ENCOUNTER
Rasheeda / Central Scheduling calling regarding orders for Dexa Scan & Mammogram    The Mammogram order needs to have removed diagnosis Post Menopausal   Dexa Scan needs to have the Mammogram Screening removed    Please call Rasheeda once complete [phone]

## 2024-01-18 ENCOUNTER — APPOINTMENT (OUTPATIENT)
Dept: CT IMAGING | Age: 84
End: 2024-01-18
Attending: PHYSICIAN ASSISTANT
Payer: MEDICARE

## 2024-01-18 ENCOUNTER — APPOINTMENT (OUTPATIENT)
Dept: GENERAL RADIOLOGY | Age: 84
End: 2024-01-18
Attending: PHYSICIAN ASSISTANT
Payer: MEDICARE

## 2024-01-18 ENCOUNTER — HOSPITAL ENCOUNTER (OUTPATIENT)
Age: 84
Discharge: HOME | End: 2024-01-18
Payer: MEDICARE

## 2024-01-18 VITALS
SYSTOLIC BLOOD PRESSURE: 134 MMHG | DIASTOLIC BLOOD PRESSURE: 72 MMHG | RESPIRATION RATE: 18 BRPM | TEMPERATURE: 98 F | HEART RATE: 66 BPM | OXYGEN SATURATION: 97 %

## 2024-01-18 DIAGNOSIS — M54.6 ACUTE RIGHT-SIDED THORACIC BACK PAIN: Primary | ICD-10-CM

## 2024-01-18 DIAGNOSIS — K64.4 EXTERNAL HEMORRHOID: ICD-10-CM

## 2024-01-18 DIAGNOSIS — R93.89 ABNORMAL CHEST CT: ICD-10-CM

## 2024-01-18 LAB
#MXD IC: 0.9 X10ˆ3/UL (ref 0.1–1)
ATRIAL RATE: 70 BPM
BILIRUB UR QL STRIP: NEGATIVE
BUN BLD-MCNC: 20 MG/DL (ref 7–18)
CHLORIDE BLD-SCNC: 105 MMOL/L (ref 98–112)
CO2 BLD-SCNC: 25 MMOL/L (ref 21–32)
CREAT BLD-MCNC: 1.2 MG/DL
DDIMER WHOLE BLOOD: 3100 NG/ML DDU (ref ?–400)
EGFRCR SERPLBLD CKD-EPI 2021: 45 ML/MIN/1.73M2 (ref 60–?)
GLUCOSE BLD-MCNC: 107 MG/DL (ref 70–99)
GLUCOSE UR STRIP-MCNC: NEGATIVE MG/DL
HCT VFR BLD AUTO: 39.3 %
HCT VFR BLD CALC: 41 %
HGB BLD-MCNC: 13.2 G/DL
ISTAT IONIZED CALCIUM FOR CHEM 8: 1.2 MMOL/L (ref 1.12–1.32)
KETONES UR STRIP-MCNC: NEGATIVE MG/DL
LYMPHOCYTES # BLD AUTO: 1.9 X10ˆ3/UL (ref 1–4)
LYMPHOCYTES NFR BLD AUTO: 20 %
MCH RBC QN AUTO: 30.8 PG (ref 26–34)
MCHC RBC AUTO-ENTMCNC: 33.6 G/DL (ref 31–37)
MCV RBC AUTO: 91.8 FL (ref 80–100)
MIXED CELL %: 9 %
NEUTROPHILS # BLD AUTO: 6.8 X10ˆ3/UL (ref 1.5–7.7)
NEUTROPHILS NFR BLD AUTO: 71 %
NITRITE UR QL STRIP: NEGATIVE
P AXIS: 11 DEGREES
P-R INTERVAL: 150 MS
PH UR STRIP: 5.5 [PH]
PLATELET # BLD AUTO: 328 X10ˆ3/UL (ref 150–450)
POTASSIUM BLD-SCNC: 3.7 MMOL/L (ref 3.6–5.1)
PROT UR STRIP-MCNC: 30 MG/DL
Q-T INTERVAL: 420 MS
QRS DURATION: 96 MS
QTC CALCULATION (BEZET): 453 MS
R AXIS: 18 DEGREES
RBC # BLD AUTO: 4.28 X10ˆ6/UL
SODIUM BLD-SCNC: 143 MMOL/L (ref 136–145)
SP GR UR STRIP: 1.02
T AXIS: 38 DEGREES
TROPONIN I BLD-MCNC: <0.02 NG/ML
UROBILINOGEN UR STRIP-ACNC: <2 MG/DL
VENTRICULAR RATE: 70 BPM
WBC # BLD AUTO: 9.6 X10ˆ3/UL (ref 4–11)

## 2024-01-18 PROCEDURE — 87086 URINE CULTURE/COLONY COUNT: CPT | Performed by: PHYSICIAN ASSISTANT

## 2024-01-18 PROCEDURE — 93005 ELECTROCARDIOGRAM TRACING: CPT

## 2024-01-18 PROCEDURE — 85025 COMPLETE CBC W/AUTO DIFF WBC: CPT | Performed by: PHYSICIAN ASSISTANT

## 2024-01-18 PROCEDURE — 36415 COLL VENOUS BLD VENIPUNCTURE: CPT

## 2024-01-18 PROCEDURE — 81002 URINALYSIS NONAUTO W/O SCOPE: CPT

## 2024-01-18 PROCEDURE — 84484 ASSAY OF TROPONIN QUANT: CPT

## 2024-01-18 PROCEDURE — 99215 OFFICE O/P EST HI 40 MIN: CPT

## 2024-01-18 PROCEDURE — 85378 FIBRIN DEGRADE SEMIQUANT: CPT | Performed by: PHYSICIAN ASSISTANT

## 2024-01-18 PROCEDURE — 93010 ELECTROCARDIOGRAM REPORT: CPT

## 2024-01-18 PROCEDURE — 93010 ELECTROCARDIOGRAM REPORT: CPT | Performed by: INTERNAL MEDICINE

## 2024-01-18 PROCEDURE — 87077 CULTURE AEROBIC IDENTIFY: CPT | Performed by: PHYSICIAN ASSISTANT

## 2024-01-18 PROCEDURE — 80047 BASIC METABLC PNL IONIZED CA: CPT

## 2024-01-18 PROCEDURE — 71260 CT THORAX DX C+: CPT | Performed by: PHYSICIAN ASSISTANT

## 2024-01-18 PROCEDURE — 71046 X-RAY EXAM CHEST 2 VIEWS: CPT | Performed by: PHYSICIAN ASSISTANT

## 2024-01-18 RX ORDER — HYDROCORTISONE 25 MG/G
1 CREAM TOPICAL 2 TIMES DAILY
Qty: 28 G | Refills: 0 | Status: SHIPPED | OUTPATIENT
Start: 2024-01-18 | End: 2024-01-23

## 2024-01-18 RX ORDER — DIAZEPAM 2 MG/1
TABLET ORAL 3 TIMES DAILY PRN
Qty: 20 TABLET | Refills: 0 | Status: SHIPPED | OUTPATIENT
Start: 2024-01-18 | End: 2024-01-25

## 2024-01-18 RX ORDER — LIDOCAINE 50 MG/G
1 PATCH TOPICAL EVERY 24 HOURS
Qty: 5 PATCH | Refills: 0 | Status: SHIPPED | OUTPATIENT
Start: 2024-01-18 | End: 2024-01-23

## 2024-01-18 NOTE — ED PROVIDER NOTES
Patient Seen in: [facility]    History     Chief Complaint   Patient presents with    Chest Pain Angina    Bleeding     Stated Complaint: right shoulder pain/bloody stool    HPI    83-year-old female presents with chief complaint of right thoracic back pain.  Onset 4 days ago.  Patient denies eliciting injury or trauma.  Patient also states that she has experienced bright red blood per rectum after having bowel movements intermittently over the past week.  Patient denies fever, chills, abdominal pain, nausea, vomiting, diarrhea, constipation, melena, dysuria, hematuria, flank pain, vaginal bleeding, vaginal discharge, saddle anesthesia, bowel/bladder incontinence, weakness, paresthesias, dyspnea, extremity pain, extremity swelling.    Past Medical History:   Diagnosis Date    Dougherty's esophagus 2008    Chronic neck pain     DVT (deep venous thrombosis) (HCC)     Hematuria 1994    w/u    High blood pressure     High cholesterol     High level of uric acid in blood 2009    10.4    Pulmonary emboli (HCC) May/June 2015    Pulmonary embolism (HCC)     Renal disorder     Renal insufficiency 2009    S/P colonoscopy 01/12/2015    Stroke (HCC)     TIA    Thalamic stroke (HCC)        Past Surgical History:   Procedure Laterality Date    COLONOSCOPY  1/15, 11/10, 11/06    COLONOSCOPY  09/2019    COLONOSCOPY N/A 09/27/2019    Procedure: COLONOSCOPY;  Surgeon: Khari Elizondo MD;  Location: [facility] ENDOSCOPY    EGD  5/14, 4/12, 5/11, 9/08    INJ TENDON/LIGAMENT/CYST      Injection Tendon Sheath, Ligament    VAGINAL HYSTERECTOMY              Family History   Problem Relation Age of Onset    Cancer Mother 54        lymphoma    Cancer Father         lung cancer    Hypertension Father     Glaucoma Father     Heart Disease Father         CAD    Diabetes Father     Other (Other) Paternal Grandmother         bunions    Cancer Maternal Grandfather         gastric cancer    Breast Cancer Paternal Aunt         60's        Social History     Socioeconomic History    Marital status:    Tobacco Use    Smoking status: Never     Passive exposure: Never    Smokeless tobacco: Never   Vaping Use    Vaping Use: Never used   Substance and Sexual Activity    Alcohol use: No    Drug use: No   Other Topics Concern    Caffeine Concern No       Review of Systems    Positive for stated complaint: right shoulder pain/bloody stool  Other systems are as noted in HPI.  Constitutional and vital signs reviewed.      All other systems reviewed and negative except as noted above.    PSFH elements reviewed from today and agreed except as otherwise stated in HPI.    Physical Exam     ED Triage Vitals [01/18/24 1037]   /64   Pulse 74   Resp 14   Temp 97.8 °F (36.6 °C)   Temp src Temporal   SpO2 94 %   O2 Device None (Room air)       Current:/72   Pulse 66   Temp 97.8 °F (36.6 °C) (Temporal)   Resp 18   SpO2 97%      PULSE OX decreased on room air at 94% as interpreted by this provider.  Upon reassessment PULSE OX within normal limits at 97% on room air as interpreted by this provider.    Constitutional: The patient is cooperative. Appears well-developed and well-nourished.  No acute distress.  Psychological: Alert, No abnormalities of mood, affect.  Head: Normocephalic/atraumatic.  Eyes: Pupils are equal round reactive to light.  Conjunctiva are within normal limits.  ENT: Oropharynx is clear.  Neck: The neck is supple.  No meningeal signs.  Respiratory: Respiratory effort was normal.  There is no rales, wheezes, or rhonchi.  There is no stridor.  Air entry is equal.  Cardiovascular: Regular rate and rhythm, no murmurs, gallops, rubs.  Capillary refill is brisk.    Genitourinary: Not examined.  Lymphatic: No gross lymphadenopathy noted.  Musculoskeletal: Back - Normal to inspection.  Positive reproducible tenderness to palpation present at right thoracic paraspinal muscles.  Palpable muscle spasm present at this location.  No CVA  tenderness bilaterally.  No vertebral point tenderness.  Full range of motion with reported pain.  Remainder of musculoskeletal system is grossly intact.  There is no obvious deformity.  Neurological: Normal motor exam.  Normal gait.  Normal sensory exam.  Patient exhibits normal speech.  Skin: 8.5 cm x 0.5 cm external, nonthrombosed, nontender hemorrhoid is present at 3:00 in the jackknife position.  No active bleeding or drainage.  No induration or fluctuance.  No erythema.  No obvious bruising.  No obvious rash.    ED Course     Labs Reviewed   D-DIMER (POC) - Abnormal; Notable for the following components:       Result Value    D-Dimer DDU 3,100 (*)     All other components within normal limits   EM POCT URINALYSIS DIPSTICK - Abnormal; Notable for the following components:    Urine Clarity Slightly cloudy (*)     Protein urine 30 (*)     Blood, Urine Small (*)     Leukocyte esterase urine Trace (*)     All other components within normal limits   POCT ISTAT CHEM8 CARTRIDGE - Abnormal; Notable for the following components:    ISTAT BUN 20 (*)     ISTAT Glucose 107 (*)     ISTAT Creatinine 1.20 (*)     eGFR-Cr 45 (*)     All other components within normal limits   ISTAT TROPONIN - Normal   POCT CBC   URINE CULTURE, ROUTINE     EKG    Rate, intervals and axes as noted on EKG Report.  Rate: 70  Rhythm: Sinus Rhythm  Reading: No STEMI, incomplete right bundle branch block     When compared to EKG of 6/8/2023, right bundle branch block now present.         MDM       Radiology findings: CT CHEST PE AORTA (IV ONLY) (CPT=71260)    Result Date: 1/18/2024  CONCLUSION:   1. No evidence of acute pulmonary embolism to the 1st subsegmental pulmonary artery level.  2. No dissection of the thoracic aorta.  Mild ectasia of the ascending thoracic aorta measuring 3.7 cm in diameter.  3. Ground-glass nodule in the right upper lobe measures 1.1 x 0.7 cm without solid component.  Recommend follow-up chest CT in 6 months.  4. Multiple  fissural pulmonary nodules are probably benign intrapulmonary lymph nodes.     Dictated by (CST): Kobi Mcgraw MD on 1/18/2024 at 12:59 PM     Finalized by (CST): Kobi Mcgraw MD on 1/18/2024 at 1:16 PM          XR CHEST PA + LAT CHEST (CPT=71046)    Result Date: 1/18/2024  CONCLUSION: Clear lungs    Dictated by (CST): Ben Munson MD on 1/18/2024 at 11:11 AM     Finalized by (CST): Ben Munson MD on 1/18/2024 at 11:12 AM           Chest x-ray images independently reviewed by this provider-no pneumonia.      Medical Decision Making  Differential diagnosis prior to workup including but not limited to ACS, PE, bronchitis, pneumonia, musculoskeletal sprain/strain, muscle spasm, gastritis, enteritis, colitis, diverticulitis, perforated viscus, bowel obstruction, UTI, GI bleed.    Problems Addressed:  Acute right-sided thoracic back pain: acute illness or injury  External hemorrhoid: acute illness or injury    Amount and/or Complexity of Data Reviewed  Labs: ordered. Decision-making details documented in ED Course.      Guaiac stool negative.    Physical exam remained stable as previously documented.  Available results reviewed with patient.    I have given the patient instructions regarding their diagnoses, expectations, follow up, and ER precautions. I explained to the patient that emergent conditions may arise and to go to the ER for new, worsening or any persistent conditions. I've explained the importance of following up with their doctor as instructed. The patient verbalized understanding of the discharge instructions and plan.    Patient case discussed with Dr. Umaña.    Disposition and Plan     Clinical Impression:  1. Acute right-sided thoracic back pain    2. External hemorrhoid    3. Abnormal chest CT        Disposition:  Discharge    Follow-up:  Leodan Deleon MD  [address] [place] [id number]  [phone]    Call in 1 day  For follow-up      Medications Prescribed:  Current Discharge  Medication List        START taking these medications    Details   hydrocortisone (PROCTOSOL HC) 2.5 % External Cream Place 1 Application rectally 2 (two) times daily for 5 days.  Qty: 28 g, Refills: 0      diazePAM 2 MG Oral Tab Take 1-2 tablets (2-4 mg total) by mouth 3 (three) times daily as needed for Anxiety.  Qty: 20 tablet, Refills: 0    Comments: Marta Brian MD  Associated Diagnoses: Acute right-sided thoracic back pain      lidocaine 5 % External Patch Place 1 patch onto the skin daily for 5 days.  Qty: 5 patch, Refills: 0

## 2024-01-18 NOTE — ED INITIAL ASSESSMENT (HPI)
R posterior shoulder pain for 4 days, no numbness or tingling to arms, no cp, no sob, no trauma or injury, also c/o blood in stool on and off for 1 week, no rectal pain, denies known hemorrhoids, no dizziness, no abd pain, no n/v/d

## 2024-01-25 ENCOUNTER — TELEPHONE (OUTPATIENT)
Facility: CLINIC | Age: 84
End: 2024-01-25

## 2024-01-25 NOTE — TELEPHONE ENCOUNTER
Pt has an upcoming [facility] procedure and has questions about which prep medicine would be better for kidneys please call

## 2024-01-25 NOTE — TELEPHONE ENCOUNTER
Dr Hernadez please see note below advised to take PEG golytely as a prep for Colonoscopy next Thursday ,asking if is ok to take.Patient labs results in [place].

## 2024-02-06 ENCOUNTER — TELEPHONE (OUTPATIENT)
Dept: INTERNAL MEDICINE CLINIC | Facility: CLINIC | Age: 84
End: 2024-02-06

## 2024-02-06 NOTE — TELEPHONE ENCOUNTER
Please call patient with Dr Deleon's recommendation for a foot doctor [phone]     Patient has trouble with her left foot  Discussed with Dr LINARES at her last visit  Pt feels she need to see foot doctor now

## 2024-02-10 ENCOUNTER — MOBILE ENCOUNTER (OUTPATIENT)
Facility: CLINIC | Age: 84
End: 2024-02-10

## 2024-02-10 DIAGNOSIS — M76.822 POSTERIOR TIBIAL TENDON DYSFUNCTION (PTTD) OF LEFT LOWER EXTREMITY: Primary | ICD-10-CM

## 2024-02-16 ENCOUNTER — HOSPITAL ENCOUNTER (OUTPATIENT)
Dept: MRI IMAGING | Facility: HOSPITAL | Age: 84
Discharge: HOME | End: 2024-02-16
Attending: PODIATRIST
Payer: MEDICARE

## 2024-02-16 DIAGNOSIS — M76.822 POSTERIOR TIBIAL TENDON DYSFUNCTION (PTTD) OF LEFT LOWER EXTREMITY: ICD-10-CM

## 2024-02-16 PROCEDURE — 73721 MRI JNT OF LWR EXTRE W/O DYE: CPT | Performed by: PODIATRIST

## 2024-05-14 ENCOUNTER — TELEPHONE (OUTPATIENT)
Dept: INTERNAL MEDICINE CLINIC | Facility: CLINIC | Age: 84
End: 2024-05-14

## 2024-05-14 NOTE — TELEPHONE ENCOUNTER
Called patient  who had shingles in 2020. Rn explained that she can get shingles more than once , but she can get the vaccine - verbalized understanding

## 2024-05-14 NOTE — TELEPHONE ENCOUNTER
Please call patient [phone]     Patient had shingles in 2020   Asks if you can get shingles more than once and if she should get the shingles vaccine

## 2024-05-21 RX ORDER — POTASSIUM CHLORIDE 750 MG/1
20 TABLET, EXTENDED RELEASE ORAL DAILY
Qty: 180 TABLET | Refills: 3 | Status: SHIPPED | OUTPATIENT
Start: 2024-05-21

## 2024-05-21 NOTE — TELEPHONE ENCOUNTER
Refill request is for a maintenance medication and has met the criteria specified in the Ambulatory Medication Refill Standing Order for eligibility, visits, laboratory, alerts and was sent to the requested pharmacy.    Requested Prescriptions     Signed Prescriptions Disp Refills    POTASSIUM CHLORIDE 10 MEQ Oral Tab  tablet 3     Sig: TAKE 2 TABLETS(20 MEQ) BY MOUTH EVERY DAY     Authorizing Provider: MARY IVY     Ordering User: JULIA MYRICK

## 2024-07-08 ENCOUNTER — HOSPITAL ENCOUNTER (OUTPATIENT)
Dept: BONE DENSITY | Age: 84
Discharge: HOME | End: 2024-07-08
Attending: INTERNAL MEDICINE
Payer: MEDICARE

## 2024-07-08 ENCOUNTER — HOSPITAL ENCOUNTER (OUTPATIENT)
Dept: MAMMOGRAPHY | Age: 84
Discharge: HOME | End: 2024-07-08
Attending: INTERNAL MEDICINE
Payer: MEDICARE

## 2024-07-08 DIAGNOSIS — R92.30 DENSE BREAST: ICD-10-CM

## 2024-07-08 DIAGNOSIS — Z78.0 ASYMPTOMATIC UNCOMPLICATED MENOPAUSE: ICD-10-CM

## 2024-07-08 DIAGNOSIS — Z12.31 ENCOUNTER FOR SCREENING MAMMOGRAM FOR BREAST CANCER: ICD-10-CM

## 2024-07-08 PROCEDURE — 77063 BREAST TOMOSYNTHESIS BI: CPT | Performed by: INTERNAL MEDICINE

## 2024-07-08 PROCEDURE — 77067 SCR MAMMO BI INCL CAD: CPT | Performed by: INTERNAL MEDICINE

## 2024-07-08 PROCEDURE — 77080 DXA BONE DENSITY AXIAL: CPT | Performed by: INTERNAL MEDICINE

## 2024-07-09 RX ORDER — FAMOTIDINE 40 MG/1
TABLET, FILM COATED ORAL
Qty: 180 TABLET | Refills: 3 | Status: SHIPPED | OUTPATIENT
Start: 2024-07-09

## 2024-07-09 NOTE — TELEPHONE ENCOUNTER
Refill request is for a maintenance medication and has met the criteria specified in the Ambulatory Medication Refill Standing Order for eligibility, visits, laboratory, alerts and was sent to the requested pharmacy.    Requested Prescriptions     Signed Prescriptions Disp Refills    FAMOTIDINE 40 MG Oral Tab 180 tablet 3     Sig: TAKE 1 TABLET(40 MG) BY MOUTH TWICE DAILY     Authorizing Provider: MARY IVY     Ordering User: CHLOE GRIMM

## 2024-07-11 RX ORDER — NIFEDIPINE 60 MG/1
TABLET, EXTENDED RELEASE ORAL
Qty: 90 TABLET | Refills: 0 | Status: SHIPPED | OUTPATIENT
Start: 2024-07-11

## 2024-07-11 RX ORDER — METOPROLOL SUCCINATE 100 MG/1
TABLET, EXTENDED RELEASE ORAL
Qty: 90 TABLET | Refills: 0 | Status: SHIPPED | OUTPATIENT
Start: 2024-07-11

## 2024-07-11 NOTE — TELEPHONE ENCOUNTER
Refill request is for a maintenance medication and has met the criteria specified in the Ambulatory Medication Refill Standing Order for eligibility, visits, laboratory, alerts and was sent to the requested pharmacy.    Requested Prescriptions     Signed Prescriptions Disp Refills    metoprolol succinate  MG Oral Tablet 24 Hr 90 tablet 0     Sig: TAKE 1 TABLET(100 MG) BY MOUTH DAILY     Authorizing Provider: MARY IVY     Ordering User: LUCAS MCCLELLAND    NIFEdipine ER 60 MG Oral Tablet 24 Hr 90 tablet 0     Sig: TAKE 1 TABLET(60 MG) BY MOUTH DAILY     Authorizing Provider: MARY IVY     Ordering User: LUCAS MCCLELLAND       To FD: please change upcoming appointment to medicare annual if MD schedule allows

## 2024-07-15 ENCOUNTER — HOSPITAL ENCOUNTER (OUTPATIENT)
Age: 84
Discharge: HOME | End: 2024-07-15
Payer: MEDICARE

## 2024-07-15 ENCOUNTER — APPOINTMENT (OUTPATIENT)
Dept: GENERAL RADIOLOGY | Age: 84
End: 2024-07-15
Attending: PHYSICIAN ASSISTANT
Payer: MEDICARE

## 2024-07-15 VITALS
OXYGEN SATURATION: 97 % | TEMPERATURE: 98 F | RESPIRATION RATE: 20 BRPM | HEART RATE: 71 BPM | DIASTOLIC BLOOD PRESSURE: 58 MMHG | SYSTOLIC BLOOD PRESSURE: 131 MMHG

## 2024-07-15 DIAGNOSIS — M25.569 PAIN IN JOINT, LOWER LEG: ICD-10-CM

## 2024-07-15 DIAGNOSIS — M25.562 ACUTE PAIN OF LEFT KNEE: Primary | ICD-10-CM

## 2024-07-15 PROCEDURE — 73562 X-RAY EXAM OF KNEE 3: CPT | Performed by: PHYSICIAN ASSISTANT

## 2024-07-15 PROCEDURE — 99213 OFFICE O/P EST LOW 20 MIN: CPT | Performed by: PHYSICIAN ASSISTANT

## 2024-07-16 ENCOUNTER — HOSPITAL ENCOUNTER (OUTPATIENT)
Dept: ULTRASOUND IMAGING | Facility: HOSPITAL | Age: 84
Discharge: HOME | End: 2024-07-16
Attending: INTERNAL MEDICINE
Payer: MEDICARE

## 2024-07-16 ENCOUNTER — HOSPITAL ENCOUNTER (OUTPATIENT)
Dept: MAMMOGRAPHY | Facility: HOSPITAL | Age: 84
Discharge: HOME | End: 2024-07-16
Attending: INTERNAL MEDICINE
Payer: MEDICARE

## 2024-07-16 DIAGNOSIS — R92.8 ABNORMAL MAMMOGRAM: ICD-10-CM

## 2024-07-16 PROCEDURE — 77061 BREAST TOMOSYNTHESIS UNI: CPT | Performed by: INTERNAL MEDICINE

## 2024-07-16 PROCEDURE — 77065 DX MAMMO INCL CAD UNI: CPT | Performed by: INTERNAL MEDICINE

## 2024-07-16 PROCEDURE — 76642 ULTRASOUND BREAST LIMITED: CPT | Performed by: INTERNAL MEDICINE

## 2024-07-16 NOTE — ED PROVIDER NOTES
Patient Seen in: [facility]    History     Chief Complaint   Patient presents with    Knee Pain     Stated Complaint: Knee Pain    HPI    HPI: Naomi Razo is a 83 year old female who presents with chief complaint of left knee pain.  Onset 5 days ago, worsening today after prolonged walking.  Patient denies eliciting injury or trauma.  Patient denies head/neck injury or pain, open wound, decreased range of motion, swelling, ecchymosis, erythema, weakness, paraesthesias.      Past Medical History:    Dougherty's esophagus    Chronic neck pain    DVT (deep venous thrombosis) (HCC)    Hematuria    w/u    High blood pressure    High cholesterol    High level of uric acid in blood    10.4    Pulmonary emboli (HCC)    Pulmonary embolism (HCC)    Renal disorder    Renal insufficiency    S/P colonoscopy    Stroke (HCC)    TIA    Thalamic stroke (HCC)       Past Surgical History:   Procedure Laterality Date    Colonoscopy  1/15, 11/10, 11/06    Colonoscopy  09/2019    Colonoscopy N/A 09/27/2019    Procedure: COLONOSCOPY;  Surgeon: Khari Elizondo MD;  Location: [facility] ENDOSCOPY    Egd  5/14, 4/12, 5/11, 9/08    Inj tendon/ligament/cyst      Injection Tendon Sheath, Ligament    Vaginal hysterectomy              Family History   Problem Relation Age of Onset    Cancer Mother 54        lymphoma    Cancer Father         lung cancer    Hypertension Father     Glaucoma Father     Heart Disease Father         CAD    Diabetes Father     Other (Other) Paternal Grandmother         bunions    Cancer Maternal Grandfather         gastric cancer    Breast Cancer Paternal Aunt         60's       Social History     Socioeconomic History    Marital status:    Tobacco Use    Smoking status: Never     Passive exposure: Never    Smokeless tobacco: Never   Vaping Use    Vaping status: Never Used   Substance and Sexual Activity    Alcohol use: No    Drug use: No   Other Topics Concern    Caffeine Concern No       Review  of Systems    Positive for stated complaint: Knee Pain  Other systems are as noted in HPI.  Constitutional and vital signs reviewed.      All other systems reviewed and negative except as noted above.    PSFH elements reviewed from today and agreed except as otherwise stated in HPI.    Physical Exam     ED Triage Vitals [07/15/24 1857]   /58   Pulse 71   Resp 20   Temp 98 °F (36.7 °C)   Temp src Temporal   SpO2 97 %   O2 Device None (Room air)       Current:/58   Pulse 71   Temp 98 °F (36.7 °C) (Temporal)   Resp 20   SpO2 97%     PULSE OX within normal limits on room air as interpreted by this provider.    Physical Exam      Constitutional: The patient is cooperative. Appears well-developed and well-nourished.  Mild discomfort.  Psychological: Alert, No abnormalities of mood, affect.  Head: Normocephalic/atraumatic.  Eyes: Pupils are equal round reactive to light.  Conjunctiva are within normal limits.  ENT: Oropharynx is clear.  Neck: The neck is supple.  No meningeal signs.  Respiratory: Respiratory effort was normal.  There is no stridor.  Air entry is equal.  Cardiovascular: Regular rate and rhythm.  Capillary refill is brisk.   Genitourinary: Not examined.  Lymphatic: No gross lymphadenopathy noted.  Musculoskeletal: Left lower extremity-Left lower extremity normal to inspection without acute bony deformity.  Positive tenderness to palpation present at left anterior knee.  Remainder of left lower extremity nontender to palpation.  Full range of motion of left knee with reported pain.  Distal pulses intact.  Capillary refill normal.  Motor intact distally.  Sensory intact distally.  No ecchymosis.  No erythema.  No swelling.  No open wounds.  No compartment syndrome.  No edema.  Remainder of musculoskeletal system is grossly intact.  There is no obvious deformity.  Neurological: Gross motor movement is intact in all 4 extremities.  Patient exhibits normal speech.  Skin: Skin is normal to  inspection, except as documented.  Warm and dry.  No obvious rash.        ED Course   Labs Reviewed - No data to display    MDM     Differential diagnosis prior to work-up including but not limited to fracture, strain/sprain, contusion, arthritis, tendinitis    Radiology findings: XR KNEE (3 VIEWS), LEFT (CPT=73562)    Result Date: 7/15/2024  CONCLUSION: Mild tricompartmental osteoarthritis of the left knee.  No acute osseous abnormality.  Soft tissue inflammation anterior to the knee which may represent soft tissue injury, bursitis, or scar.    Dictated by (CST): Rj Higgins MD on 7/15/2024 at 7:23 PM     Finalized by (CST): Rj Higgins MD on 7/15/2024 at 7:24 PM           X-ray images of left knee independently viewed by this provider-acute fracture.    Physical exam remained stable as previously documented.  Available results reviewed with patient.    I have given the patient instructions regarding their diagnoses, expectations, follow up, and ER precautions. I explained to the patient that emergent conditions may arise and to go to the ER for new, worsening or any persistent conditions. I've explained the importance of following up with their doctor as instructed. The patient verbalized understanding of the discharge instructions and plan.    Disposition and Plan     Clinical Impression:  1. Acute pain of left knee    2. Pain in joint, lower leg        Disposition:  Discharge    Follow-up:  Aime Dubose MD  [address] [facility]  [address] [place] [id number]  [phone]    Call in 1 day  For follow-up      Medications Prescribed:  Current Discharge Medication List

## 2024-07-17 PROBLEM — Z86.711 HISTORY OF PULMONARY EMBOLISM: Status: ACTIVE | Noted: 2024-07-17

## 2024-07-18 ENCOUNTER — LAB ENCOUNTER (OUTPATIENT)
Dept: LAB | Facility: HOSPITAL | Age: 84
End: 2024-07-18
Attending: INTERNAL MEDICINE
Payer: MEDICARE

## 2024-07-18 DIAGNOSIS — E78.00 HYPERCHOLESTEROLEMIA: ICD-10-CM

## 2024-07-18 DIAGNOSIS — N28.9 RENAL INSUFFICIENCY: ICD-10-CM

## 2024-07-18 LAB
ALBUMIN SERPL-MCNC: 4.1 G/DL (ref 3.2–4.8)
ALBUMIN/GLOB SERPL: 1.6 {RATIO} (ref 1–2)
ALP LIVER SERPL-CCNC: 114 U/L
ALT SERPL-CCNC: 13 U/L
ANION GAP SERPL CALC-SCNC: 7 MMOL/L (ref 0–18)
AST SERPL-CCNC: 18 U/L (ref ?–34)
BASOPHILS # BLD AUTO: 0.05 X10(3) UL (ref 0–0.2)
BASOPHILS NFR BLD AUTO: 0.5 %
BILIRUB SERPL-MCNC: 0.9 MG/DL (ref 0.2–1.1)
BUN BLD-MCNC: 18 MG/DL (ref 9–23)
BUN/CREAT SERPL: 15.8 (ref 10–20)
CALCIUM BLD-MCNC: 9.2 MG/DL (ref 8.7–10.4)
CHLORIDE SERPL-SCNC: 108 MMOL/L (ref 98–112)
CHOLEST SERPL-MCNC: 134 MG/DL (ref ?–200)
CO2 SERPL-SCNC: 25 MMOL/L (ref 21–32)
CREAT BLD-MCNC: 1.14 MG/DL
DEPRECATED RDW RBC AUTO: 43.3 FL (ref 35.1–46.3)
EGFRCR SERPLBLD CKD-EPI 2021: 48 ML/MIN/1.73M2 (ref 60–?)
EOSINOPHIL # BLD AUTO: 0.72 X10(3) UL (ref 0–0.7)
EOSINOPHIL NFR BLD AUTO: 7.3 %
ERYTHROCYTE [DISTWIDTH] IN BLOOD BY AUTOMATED COUNT: 12.8 % (ref 11–15)
FASTING PATIENT LIPID ANSWER: YES
FASTING STATUS PATIENT QL REPORTED: YES
GLOBULIN PLAS-MCNC: 2.6 G/DL (ref 2–3.5)
GLUCOSE BLD-MCNC: 103 MG/DL (ref 70–99)
HCT VFR BLD AUTO: 39.5 %
HDLC SERPL-MCNC: 54 MG/DL (ref 40–59)
HGB BLD-MCNC: 13.6 G/DL
IMM GRANULOCYTES # BLD AUTO: 0.03 X10(3) UL (ref 0–1)
IMM GRANULOCYTES NFR BLD: 0.3 %
LDLC SERPL CALC-MCNC: 65 MG/DL (ref ?–100)
LYMPHOCYTES # BLD AUTO: 3.19 X10(3) UL (ref 1–4)
LYMPHOCYTES NFR BLD AUTO: 32.6 %
MCH RBC QN AUTO: 31.5 PG (ref 26–34)
MCHC RBC AUTO-ENTMCNC: 34.4 G/DL (ref 31–37)
MCV RBC AUTO: 91.4 FL
MONOCYTES # BLD AUTO: 1.02 X10(3) UL (ref 0.1–1)
MONOCYTES NFR BLD AUTO: 10.4 %
NEUTROPHILS # BLD AUTO: 4.79 X10 (3) UL (ref 1.5–7.7)
NEUTROPHILS # BLD AUTO: 4.79 X10(3) UL (ref 1.5–7.7)
NEUTROPHILS NFR BLD AUTO: 48.9 %
NONHDLC SERPL-MCNC: 80 MG/DL (ref ?–130)
OSMOLALITY SERPL CALC.SUM OF ELEC: 292 MOSM/KG (ref 275–295)
PLATELET # BLD AUTO: 281 10(3)UL (ref 150–450)
POTASSIUM SERPL-SCNC: 3.6 MMOL/L (ref 3.5–5.1)
PROT SERPL-MCNC: 6.7 G/DL (ref 5.7–8.2)
RBC # BLD AUTO: 4.32 X10(6)UL
SODIUM SERPL-SCNC: 140 MMOL/L (ref 136–145)
TRIGL SERPL-MCNC: 78 MG/DL (ref 30–149)
TSI SER-ACNC: 4.53 MIU/ML (ref 0.55–4.78)
VLDLC SERPL CALC-MCNC: 12 MG/DL (ref 0–30)
WBC # BLD AUTO: 9.8 X10(3) UL (ref 4–11)

## 2024-07-18 PROCEDURE — 85025 COMPLETE CBC W/AUTO DIFF WBC: CPT

## 2024-07-18 PROCEDURE — 80053 COMPREHEN METABOLIC PANEL: CPT

## 2024-07-18 PROCEDURE — 84443 ASSAY THYROID STIM HORMONE: CPT

## 2024-07-18 PROCEDURE — 36415 COLL VENOUS BLD VENIPUNCTURE: CPT

## 2024-07-18 PROCEDURE — 80061 LIPID PANEL: CPT

## 2024-07-18 RX ORDER — ATORVASTATIN CALCIUM 20 MG/1
20 TABLET, FILM COATED ORAL NIGHTLY
Qty: 90 TABLET | Refills: 3 | Status: SHIPPED | OUTPATIENT
Start: 2024-07-18

## 2024-07-18 NOTE — TELEPHONE ENCOUNTER
Refill request is for a maintenance medication and has met the criteria specified in the Ambulatory Medication Refill Standing Order for eligibility, visits, laboratory, alerts and was sent to the requested pharmacy.    Requested Prescriptions     Signed Prescriptions Disp Refills    ATORVASTATIN 20 MG Oral Tab 90 tablet 3     Sig: TAKE 1 TABLET(20 MG) BY MOUTH EVERY NIGHT     Authorizing Provider: MARY IVY     Ordering User: LUCAS MCCLELLAND

## 2024-07-23 ENCOUNTER — OFFICE VISIT (OUTPATIENT)
Dept: INTERNAL MEDICINE CLINIC | Facility: CLINIC | Age: 84
End: 2024-07-23

## 2024-07-23 ENCOUNTER — TELEPHONE (OUTPATIENT)
Dept: INTERNAL MEDICINE CLINIC | Facility: CLINIC | Age: 84
End: 2024-07-23

## 2024-07-23 VITALS
DIASTOLIC BLOOD PRESSURE: 68 MMHG | BODY MASS INDEX: 31.4 KG/M2 | HEIGHT: 62 IN | HEART RATE: 67 BPM | WEIGHT: 170.63 LBS | TEMPERATURE: 98 F | OXYGEN SATURATION: 96 % | SYSTOLIC BLOOD PRESSURE: 104 MMHG

## 2024-07-23 DIAGNOSIS — R91.1 PULMONARY NODULE: ICD-10-CM

## 2024-07-23 DIAGNOSIS — I10 HYPERTENSION, BENIGN: ICD-10-CM

## 2024-07-23 DIAGNOSIS — R92.8 ABNORMAL MAMMOGRAM: ICD-10-CM

## 2024-07-23 DIAGNOSIS — M25.562 ACUTE PAIN OF LEFT KNEE: ICD-10-CM

## 2024-07-23 DIAGNOSIS — K22.719 BARRETT'S ESOPHAGUS WITH DYSPLASIA: ICD-10-CM

## 2024-07-23 DIAGNOSIS — Z00.00 ENCOUNTER FOR MEDICARE ANNUAL WELLNESS EXAM: Primary | ICD-10-CM

## 2024-07-23 DIAGNOSIS — E78.00 HYPERCHOLESTEROLEMIA: ICD-10-CM

## 2024-07-23 DIAGNOSIS — I63.81 THALAMIC STROKE (HCC): ICD-10-CM

## 2024-07-23 PROCEDURE — G0439 PPPS, SUBSEQ VISIT: HCPCS | Performed by: INTERNAL MEDICINE

## 2024-07-23 PROCEDURE — 99214 OFFICE O/P EST MOD 30 MIN: CPT | Performed by: INTERNAL MEDICINE

## 2024-07-23 PROCEDURE — 96372 THER/PROPH/DIAG INJ SC/IM: CPT | Performed by: INTERNAL MEDICINE

## 2024-07-23 RX ORDER — TRIAMCINOLONE ACETONIDE 40 MG/ML
40 INJECTION, SUSPENSION INTRA-ARTICULAR; INTRAMUSCULAR ONCE
Status: COMPLETED | OUTPATIENT
Start: 2024-07-23 | End: 2024-07-23

## 2024-07-23 RX ADMIN — TRIAMCINOLONE ACETONIDE 40 MG: 40 INJECTION, SUSPENSION INTRA-ARTICULAR; INTRAMUSCULAR at 10:34:00

## 2024-07-23 NOTE — TELEPHONE ENCOUNTER
please see below and advise.   Left knee Xrays ordered at today's office visit but patient already had left knee Xrays done on 7/15 (report in Epic)

## 2024-07-23 NOTE — TELEPHONE ENCOUNTER
Patient called regarding knee xray Dr Deleon ordered at today's appointment    Patient forgot to mention she had knee xrays  done on July 15 at [facility]     Asks if she should have xrays  done again     Call back # [phone]

## 2024-07-23 NOTE — TELEPHONE ENCOUNTER
She does not need to repeat. Xray showed only mild arthritis. See ortho and lets see how steroid injection works

## 2024-07-23 NOTE — PROGRESS NOTES
Naomi Razo is a 83 year old female.  HPI:   She is here for annual  wellness exam.     She had chest CT in Jan for chest pain which showed nodule - 1.1 cm ground glass nodule RUL -  repeat now.     She tore a tendon in her foot and saw Dr Brown drake and then brace.     Pain left knee over the last week - no fall or injury.  She had an x-ray done in the emergency room on 7/15 which showed only mild osteoarthritis.  Pain is mostly over the medial aspect of the left knee.    She had colonoscopy 4 mo ago.       SR: no chest pain or sob, no gu or gi sx.    BP Readings from Last 6 Encounters:   07/23/24 104/68   07/15/24 131/58   01/18/24 134/72   12/12/23 120/70   08/30/23 120/85   07/28/23 119/61     Wt Readings from Last 6 Encounters:   07/23/24 170 lb 9.6 oz (77.4 kg)   12/12/23 165 lb (74.8 kg)   08/30/23 159 lb (72.1 kg)   07/19/23 155 lb (70.3 kg)   06/05/23 160 lb (72.6 kg)   02/15/23 160 lb (72.6 kg)     Current Outpatient Medications   Medication Sig Dispense Refill    ATORVASTATIN 20 MG Oral Tab TAKE 1 TABLET(20 MG) BY MOUTH EVERY NIGHT 90 tablet 3    metoprolol succinate  MG Oral Tablet 24 Hr TAKE 1 TABLET(100 MG) BY MOUTH DAILY 90 tablet 0    NIFEdipine ER 60 MG Oral Tablet 24 Hr TAKE 1 TABLET(60 MG) BY MOUTH DAILY 90 tablet 0    FAMOTIDINE 40 MG Oral Tab TAKE 1 TABLET(40 MG) BY MOUTH TWICE DAILY 180 tablet 3    POTASSIUM CHLORIDE 10 MEQ Oral Tab CR TAKE 2 TABLETS(20 MEQ) BY MOUTH EVERY  tablet 3    nystatin 100,000 Units/g External Ointment APPLY SMALL AMOUNT TO THE AFFECTED AREA 2 TO 3 TIMES DAILY. AS NEEDED 30 g 1    aspirin 81 MG Oral Chew Tab Chew 1 tablet (81 mg total) by mouth daily. 30 tablet 0    Multiple Vitamins-Minerals (CENTRUM ULTRA WOMENS) Oral Tab Take 1 tablet by mouth daily.      Calcium Carbonate-Vitamin D (CALCIUM 600 + D OR) Take 1 tablet by mouth 2 (two) times daily.          Past Medical History:    Dougherty's esophagus    Chronic neck pain    DVT (deep venous  thrombosis) (HCC)    Hematuria    w/u    High blood pressure    High cholesterol    High level of uric acid in blood    10.4    Pulmonary emboli (HCC)    Pulmonary embolism (HCC)    Renal disorder    Renal insufficiency    S/P colonoscopy    Stroke (HCC)    TIA    Thalamic stroke (HCC)      Social History:  Social History     Socioeconomic History    Marital status:    Tobacco Use    Smoking status: Never     Passive exposure: Never    Smokeless tobacco: Never   Vaping Use    Vaping status: Never Used   Substance and Sexual Activity    Alcohol use: No    Drug use: No   Other Topics Concern    Caffeine Concern No        REVIEW OF SYSTEMS:   GENERAL HEALTH: feels well otherwise  SKIN: denies any unusual skin lesions or rashes  RESPIRATORY: denies shortness of breath with exertion  CARDIOVASCULAR: denies chest pain on exertion  GI: denies abdominal pain and denies heartburn  NEURO: denies headaches    EXAM:   /68   Pulse 67   Temp 97.9 °F (36.6 °C)   Ht 5' 2\" (1.575 m)   Wt 170 lb 9.6 oz (77.4 kg)   SpO2 96%   BMI 31.20 kg/m²   GENERAL: well developed, well nourished,in no apparent distress  SKIN: no rashes,no suspicious lesions  HEENT: atraumatic, normocephalic,ears and throat are clear  NECK: supple,no adenopathy,no bruits  LUNGS: clear to auscultation  CARDIO: RRR without murmur  GI: good BS's,no masses, HSM or tenderness  EXTREMITIES: no cyanosis, clubbing or edema    Breasts: no masses, no axillary adenopathy     Examination of the left knee fails to reveal any joint effusion.  She has full range of motion of the knee.  After informed consent and under sterile conditions and after timeout 1 cc of Kenalog and 1 cc of Marcaine was injected into the knee joint medially.  This was tolerated well.  Post vital signs by myself were unchanged.      General Health     In the past six months, have you lost more than 10 pounds without trying?: 2 - No    Has your appetite been poor?: No    Type of Diet:  Balanced    How does the patient maintain a good energy level?: Daily Walks    How would you describe your daily physical activity?: Light    How would you describe your current health state?: Good    How do you maintain positive mental well-being?: Social Interaction;Games;Puzzles;Visiting Friends;Visiting Family         Have you had any immunizations at another office such as Influenza, Hepatitis B, Tetanus, or Pneumococcal?: Yes     Functional Ability     Bathing or Showering: Able without help    Toileting: Able without help    Dressing: Able without help    Eating: Able without help    Driving: Able without help    Preparing your meals: Able without help    Managing money/bills: Able without help    Taking medications as prescribed: Able without help    Are you able to afford your medications?: Yes    Hearing Problems?: No     Functional Status     Hearing Problems?: No    Vision Problems? : No    Difficulty walking?: No    Difficulty dressing or bathing?: No    Problems with daily activities? : No    Memory Problems?: No      Fall/Risk Assessment                                                              Depression Screening (PHQ-2/PHQ-9): Over the LAST 2 WEEKS                      Advance Directives     Do you have a healthcare power of ?: Yes    Do you have a living will?: Yes     Hearing Assessment (Required for AWV/SWV)      Hearing Screening    Screening Method: Questionnaire  I have a problem hearing over the telephone: No I have trouble following the conversations when two or more people are talking at the same time: No   I have trouble understanding things on the TV: No I have to strain to understand conversations: No   I have to worry about missing the telephone ring or doorbell: No I have trouble hearing conversations in a noisy background such as a crowded room or restaurant: No   I get confused about where sounds come from: No I misunderstand some words in a sentence and need to ask  people to repeat themselves: No   I especially have trouble understanding the speech of women and children: No I have trouble understanding the speaker in a large room such as at a meeting or place of Jew: No   Many people I talk to seem to mumble (or don't speak clearly): No People get annoyed because I misunderstand what they say: No   I misunderstand what others are saying and make inappropriate responses: No I avoid social activities because I cannot hear well and fear I will reply improperly: No   Family members and friends have told me they think I may have hearing loss: No             Visual Acuity                   Cognitive Assessment     What day of the week is this?: Correct    What month is it?: Correct    What year is it?: Correct    Recall \"Ball\": Correct    Recall \"Flag\": Correct    Recall \"Tree\": Correct        Naomi Razo's SCREENING SCHEDULE   Tests on this list are recommended by your physician but may not be covered, or covered at this frequency, by your insurer. Please check with your insurance carrier before scheduling to verify coverage.   PREVENTATIVE SERVICES  INDICATIONS AND SCHEDULE Internal Lab or Procedure External Lab or Procedure   Diabetes Screening      HbgA1C   Annually HgbA1C (%)   Date Value   06/22/2020 6.0 (H)         No data to display                Fasting Blood Sugar (FSB)Annually Glucose (mg/dL)   Date Value   07/18/2024 103 (H)         No data to display               Cardiovascular Disease Screening     LDL Annually LDL Cholesterol (mg/dL)   Date Value   07/18/2024 65        EKG One Time yes    Colorectal Cancer Screening      Colonoscopy Screen every 10 years Health Maintenance   Topic Date Due    Colorectal Cancer Screening  02/01/2034    Update Health Maintenance if applicable    Flex Sigmoidoscopy Screen every 5 years No results found for this or any previous visit.      No data to display                 Fecal Occult Blood Annually No results found for:  \"FOB\", \"OCCULTSTOOL\"      No data to display                Glaucoma Screening      Ophthalmology Visit Annually yes    Bone Density Screening      Dexascan Every two years Last Dexa Scan:    XR DEXA BONE DENSITOMETRY (CPT=77080) 07/08/2024        No data to display               Pap and Pelvic      Pap: Every 3 yrs age 21-65 or Pap+HPV every 5 yrs age 30-65, age 65 and older at high risk   No recommendations at this time Update Health Maintenance if applicable    Chlamydia  Annually if high risk No results found for: \"CHLAMYDIA\"      No data to display                Screening Mammogram      Mammogram  Annually No recommendations at this time Update Health Maintenance if applicable   Immunizations      Influenza No orders found for this or any previous visit. Update Immunization Activity if applicable    Pneumococcal Orders placed or performed in visit on 11/06/18    PNEUMOCOCCAL IMM, 23   Orders placed or performed in visit on 02/03/15    PNEUMOCOCCAL VACC, 13 ALLEGRA IM    Update Immunization Activity if applicable    Hepatitis B No orders found for this or any previous visit. Update Immunization Activity if applicable    Tetanus No orders found for this or any previous visit. Update Immunization Activity if applicable    Zoster (Not covered by Medicare Part B) No orders found for this or any previous visit. Update Immunization Activity if applicable     SPECIFIC DISEASE MONITORING Internal Lab or Procedure External Lab or Procedure   Annual Monitoring of Persistent     Medications (ACE/ARB, digoxin, diuretics)    Potassium  Annually Potassium (mmol/L)   Date Value   07/18/2024 3.6     POTASSIUM (P) (mmol/L)   Date Value   10/13/2015 3.8         No data to display                Creatinine  Annually Creatinine (mg/dL)   Date Value   07/18/2024 1.14 (H)         No data to display                Digoxin Serum Conc  Annually No results found for: \"DIGOXIN\"      No data to display                Diabetes      HgbA1C   Annually HgbA1C (%)   Date Value   06/22/2020 6.0 (H)         No data to display                Creat/alb ratio  Annually      LDL  Annually LDL Cholesterol (mg/dL)   Date Value   07/18/2024 65         No data to display                 Dilated Eye exam  Annually      No data to display                   No data to display                COPD      Spirometry Testing Annually No results found for this or any previous visit.      No data to display                       ASSESSMENT AND PLAN:   1. Encounter for Medicare annual wellness exam  Her examination is unrevealing and she has no alarm sx.     Labs reviewed    2. Hypercholesterolemia  At goal, same meds     3. Hypertension, benign  At goal,same meds     4. Thalamic stroke (HCC)  No recurrence, she had dizziness and visual disturbance at the time several years ago.    5. Dougherty's esophagus with dysplasia  She follows with Dr Elizondo     6. Acute pain of left knee  Observe reaction to steroid injection , ortho referral, xray knee     The patient indicates understanding of these issues and agrees to the plan.  The patient is asked to return in 6 mo - Dr Monique.

## 2024-07-23 NOTE — TELEPHONE ENCOUNTER
Patient contacted and notified that she does not need to repeat the left knee Xray. Per , see ortho and see how the steroid injection works. Patient verbalized understanding.

## 2024-07-24 PROBLEM — Z86.73 HISTORY OF STROKE: Status: ACTIVE | Noted: 2024-07-24

## 2024-07-24 PROBLEM — I63.81 THALAMIC STROKE (HCC): Status: RESOLVED | Noted: 2020-07-23 | Resolved: 2024-07-24

## 2024-07-30 ENCOUNTER — TELEPHONE (OUTPATIENT)
Dept: INTERNAL MEDICINE CLINIC | Facility: CLINIC | Age: 84
End: 2024-07-30

## 2024-07-30 ENCOUNTER — HOSPITAL ENCOUNTER (OUTPATIENT)
Dept: CT IMAGING | Facility: HOSPITAL | Age: 84
Discharge: HOME | End: 2024-07-30
Attending: INTERNAL MEDICINE
Payer: MEDICARE

## 2024-07-30 DIAGNOSIS — R91.1 PULMONARY NODULE: ICD-10-CM

## 2024-07-30 PROCEDURE — 71250 CT THORAX DX C-: CPT | Performed by: INTERNAL MEDICINE

## 2024-07-30 NOTE — TELEPHONE ENCOUNTER
Patient called checking on the status of her CT question.    Spoke to nurse Parra and she is checking with the doctor and will call the patient back.

## 2024-07-30 NOTE — TELEPHONE ENCOUNTER
To Dr. LINARES:  Spoke with pt.  Pt has CT Chest scheduled for today at 1 pm.  Reports this is a follow up scan for a CT from 6 months ago - states she did have IV contrast done with that scan.    Pt inquiring if today's scan should be done with & without IV contrast to help compare it to the previous scan.    Reports history of CKD and was told to avoid contrast.   Had recent CMP done on 7/18.    Component      Latest Ref Rng 7/18/2024   CREATININE      0.55 - 1.02 mg/dL 1.14 (H)             Please advise.    Thanks!

## 2024-07-30 NOTE — TELEPHONE ENCOUNTER
Patient is calling she is scheduled for CT Chest today at 1 pm    Patient is asking if this test needs to be done with or without Contrast Dye?    Please call patient [phone]

## 2024-08-02 ENCOUNTER — TELEPHONE (OUTPATIENT)
Dept: INTERNAL MEDICINE CLINIC | Facility: CLINIC | Age: 84
End: 2024-08-02

## 2024-08-02 DIAGNOSIS — R91.1 PULMONARY NODULE: Primary | ICD-10-CM

## 2024-08-02 NOTE — TELEPHONE ENCOUNTER
Patient contacted and relayed 's message regarding CT results. Patient verbalized understanding. CT chest ordered per .

## 2024-08-02 NOTE — TELEPHONE ENCOUNTER
CT chest looks good, nodule looks stable and radiologist rec repeat CT in 1 yr - please order CT chest, no infusion.

## 2024-08-06 NOTE — PROGRESS NOTES
Shaunna Bowser is a 70year old female. HPI:   She has had increasing pain in the left knee since last Wed - no fall or injury. She seeks a steroid injection. Last steroid injection was 12/17. She is otherwise stable.        SR: no chest pain or sob, n Used    Alcohol use: No    Drug use: No       REVIEW OF SYSTEMS:   GENERAL HEALTH: feels well otherwise  SKIN: denies any unusual skin lesions or rashes  RESPIRATORY: denies shortness of breath with exertion  CARDIOVASCULAR: denies chest pain on exertion What Type Of Note Output Would You Prefer (Optional)?: Bullet Format Hpi Title: Evaluation of Skin Lesions

## 2024-08-16 ENCOUNTER — TELEPHONE (OUTPATIENT)
Dept: NEPHROLOGY | Facility: CLINIC | Age: 84
End: 2024-08-16

## 2024-08-16 NOTE — TELEPHONE ENCOUNTER
Patient scheduled yearly follow up on 9/3 and has pending labs. Patient recently completed labs by Dr. Deleon and does show some duplicate labs. Patient needs clarification on what labs she should complete. Please call at [phone],thanks.

## 2024-08-27 ENCOUNTER — TELEPHONE (OUTPATIENT)
Dept: NEPHROLOGY | Facility: CLINIC | Age: 84
End: 2024-08-27

## 2024-08-27 NOTE — TELEPHONE ENCOUNTER
Patient called to ask if she needs to fast for her labs ordered by Dr. Hernadez. Patient also wanted to confirm the location of the appointment because she was told Dr. Hernadez was going to be in a different suit. Please call.

## 2024-08-28 ENCOUNTER — LAB ENCOUNTER (OUTPATIENT)
Dept: LAB | Facility: HOSPITAL | Age: 84
End: 2024-08-28
Attending: INTERNAL MEDICINE
Payer: MEDICARE

## 2024-08-28 DIAGNOSIS — N18.30 STAGE 3 CHRONIC KIDNEY DISEASE, UNSPECIFIED WHETHER STAGE 3A OR 3B CKD (HCC): ICD-10-CM

## 2024-08-28 LAB
ALBUMIN SERPL-MCNC: 4 G/DL (ref 3.2–4.8)
ANION GAP SERPL CALC-SCNC: 8 MMOL/L (ref 0–18)
BUN BLD-MCNC: 22 MG/DL (ref 9–23)
BUN/CREAT SERPL: 17.5 (ref 10–20)
CALCIUM BLD-MCNC: 9.1 MG/DL (ref 8.7–10.4)
CHLORIDE SERPL-SCNC: 110 MMOL/L (ref 98–112)
CO2 SERPL-SCNC: 25 MMOL/L (ref 21–32)
CREAT BLD-MCNC: 1.26 MG/DL
CREAT UR-SCNC: 153.2 MG/DL
EGFRCR SERPLBLD CKD-EPI 2021: 43 ML/MIN/1.73M2 (ref 60–?)
GLUCOSE BLD-MCNC: 102 MG/DL (ref 70–99)
OSMOLALITY SERPL CALC.SUM OF ELEC: 300 MOSM/KG (ref 275–295)
PHOSPHATE SERPL-MCNC: 3.3 MG/DL (ref 2.4–5.1)
POTASSIUM SERPL-SCNC: 3.8 MMOL/L (ref 3.5–5.1)
PROT UR-MCNC: 23.2 MG/DL (ref ?–14)
PROT/CREAT UR-RTO: 0.15
PTH-INTACT SERPL-MCNC: 132.3 PG/ML (ref 18.5–88)
SODIUM SERPL-SCNC: 143 MMOL/L (ref 136–145)

## 2024-08-28 PROCEDURE — 80069 RENAL FUNCTION PANEL: CPT

## 2024-08-28 PROCEDURE — 82570 ASSAY OF URINE CREATININE: CPT

## 2024-08-28 PROCEDURE — 84156 ASSAY OF PROTEIN URINE: CPT

## 2024-08-28 PROCEDURE — 36415 COLL VENOUS BLD VENIPUNCTURE: CPT

## 2024-08-28 PROCEDURE — 83970 ASSAY OF PARATHORMONE: CPT

## 2024-08-30 NOTE — TELEPHONE ENCOUNTER
Informed pt n need to fast for the labs and for the visit is in the same office 301,pt verbalized understanding.

## 2024-09-03 ENCOUNTER — OFFICE VISIT (OUTPATIENT)
Facility: CLINIC | Age: 84
End: 2024-09-03

## 2024-09-03 ENCOUNTER — TELEPHONE (OUTPATIENT)
Facility: CLINIC | Age: 84
End: 2024-09-03

## 2024-09-03 VITALS
DIASTOLIC BLOOD PRESSURE: 60 MMHG | HEART RATE: 68 BPM | SYSTOLIC BLOOD PRESSURE: 120 MMHG | WEIGHT: 170 LBS | BODY MASS INDEX: 31 KG/M2

## 2024-09-03 DIAGNOSIS — N18.30 STAGE 3 CHRONIC KIDNEY DISEASE, UNSPECIFIED WHETHER STAGE 3A OR 3B CKD (HCC): Primary | ICD-10-CM

## 2024-09-03 DIAGNOSIS — E87.6 HYPOKALEMIA: ICD-10-CM

## 2024-09-03 DIAGNOSIS — N25.81 SECONDARY HYPERPARATHYROIDISM (HCC): ICD-10-CM

## 2024-09-03 DIAGNOSIS — R31.29 MICROSCOPIC HEMATURIA: ICD-10-CM

## 2024-09-03 DIAGNOSIS — N18.30 HYPERTENSIVE KIDNEY DISEASE WITH STAGE 3 CHRONIC KIDNEY DISEASE, UNSPECIFIED WHETHER STAGE 3A OR 3B CKD (HCC): ICD-10-CM

## 2024-09-03 DIAGNOSIS — I12.9 HYPERTENSIVE KIDNEY DISEASE WITH STAGE 3 CHRONIC KIDNEY DISEASE, UNSPECIFIED WHETHER STAGE 3A OR 3B CKD (HCC): ICD-10-CM

## 2024-09-03 PROCEDURE — 99214 OFFICE O/P EST MOD 30 MIN: CPT | Performed by: INTERNAL MEDICINE

## 2024-09-03 NOTE — TELEPHONE ENCOUNTER
Patient calling to inform Nephrologist that she remembered the physician's name and it was Dr Susan Monique.  For additional questions please call.  Thank you.

## 2024-09-03 NOTE — PROGRESS NOTES
[facility]    Progress Note     Naomi Razo    83 yr old lady with CKD III a,  here for follow up visit . Hospitalized 7/23-7/24/20 with gait instability . MRI showed L thalamic infarct & she was seen by neurology . BP controlled.    last month had UTI post urodynamic study and treated with keflex, followed by diarrhea and was on PO vanco.  now feels much better.  no current issues.     Saw Dr Perez urogynecologist for uterine prolapse and cystocele -   S/p vaginal hysterectomy and bladder lift, by Dr Rosetta Perez, 7/28/23  No complications      SUBJECTIVE    Doing well  Left tendon rupture, in a boot for 5 weeks and then a brace now better    HISTORY:  Past Medical History:    Dougherty's esophagus    Chronic neck pain    DVT (deep venous thrombosis) (HCC)    Hematuria    w/u    High blood pressure    High cholesterol    High level of uric acid in blood    10.4    Pulmonary emboli (HCC)    Pulmonary embolism (HCC)    Renal disorder    Renal insufficiency    S/P colonoscopy    Stroke (HCC)    TIA    Thalamic stroke (HCC)      Past Surgical History:   Procedure Laterality Date    Colonoscopy  1/15, 11/10, 11/06    Colonoscopy  09/2019    Colonoscopy N/A 09/27/2019    Procedure: COLONOSCOPY;  Surgeon: Khari Elizondo MD;  Location: [facility] ENDOSCOPY    Colonoscopy  02/2024    Egd  5/14, 4/12, 5/11, 9/08    Inj tendon/ligament/cyst      Injection Tendon Sheath, Ligament    Vaginal hysterectomy             Medications (Active prior to today's visit):  Current Outpatient Medications   Medication Sig Dispense Refill    ATORVASTATIN 20 MG Oral Tab TAKE 1 TABLET(20 MG) BY MOUTH EVERY NIGHT 90 tablet 3    metoprolol succinate  MG Oral Tablet 24 Hr TAKE 1 TABLET(100 MG) BY MOUTH DAILY 90 tablet 0    NIFEdipine ER 60 MG Oral Tablet 24 Hr TAKE 1 TABLET(60 MG) BY MOUTH DAILY 90 tablet 0    FAMOTIDINE 40 MG Oral Tab TAKE 1 TABLET(40 MG) BY MOUTH TWICE DAILY 180 tablet 3    POTASSIUM CHLORIDE 10 MEQ Oral  Tab CR TAKE 2 TABLETS(20 MEQ) BY MOUTH EVERY  tablet 3    nystatin 100,000 Units/g External Ointment APPLY SMALL AMOUNT TO THE AFFECTED AREA 2 TO 3 TIMES DAILY. AS NEEDED 30 g 1    aspirin 81 MG Oral Chew Tab Chew 1 tablet (81 mg total) by mouth daily. 30 tablet 0    Multiple Vitamins-Minerals (CENTRUM ULTRA WOMENS) Oral Tab Take 1 tablet by mouth daily.      Calcium Carbonate-Vitamin D (CALCIUM 600 + D OR) Take 1 tablet by mouth 2 (two) times daily.           Allergies:  Allergies   Allergen Reactions    Ibuprofen OTHER (SEE COMMENTS)     Kidney problems    Nsaids OTHER (SEE COMMENTS)     Kidney problems      Radiology Contrast Iodinated Dyes OTHER (SEE COMMENTS)     Kidney problems      Azithromycin DIARRHEA         ROS:     Constitutional:  Negative for decreased activity, fever, irritability and lethargy  ENMT:  Negative for ear drainage, hearing loss and nasal drainage  Eyes:  Negative for eye discharge and vision loss  Cardiovascular:  Negative for chest pain, sob  Respiratory:  Negative for cough, dyspnea and wheezing  Gastrointestinal:  Negative for abdominal pain, constipation  Genitourinary:  Negative for dysuria and hematuria  Endocrine:  Negative for abnormal sleep patterns  Hema/Lymph:  Negative for easy bleeding and easy bruising  Integumentary:  Negative for pruritus and rash  Musculoskeletal:  Negative for bone/joint symptoms  Neurological:  Negative for gait disturbance  Psychiatric:  Negative for inappropriate interaction and psychiatric symptoms      Vitals:    09/03/24 1300   BP: 120/60   Pulse: 68       PHYSICAL EXAM:   Constitutional: appears well hydrated alert and responsive   Head/Face: normocephalic  Eyes/Vision: normal extraocular motion is intact  Nose/Mouth/Throat:mucous membranes are moist   Neck/Thyroid: neck is supple without adenopathy  Respiratory:  lungs are clear to auscultation bilaterally  Cardiovascular: regular rate and rhythm   Abdomen: soft, non-tender, non-distended,  BS normal  Skin/Hair: no unusual rashes present, no abnormal bruising noted  Musculoskeletal: no deformities  Extremities: no edema  Neurological:  Grossly normal      Lab Results   Component Value Date     08/28/2024     07/18/2024     12/12/2023    K 3.8 08/28/2024    K 3.6 07/18/2024    K 3.4 (L) 12/12/2023     08/28/2024     07/18/2024     12/12/2023    CO2 25.0 08/28/2024    CO2 25.0 07/18/2024    CO2 26.0 12/12/2023    BUN 22 08/28/2024    BUN 18 07/18/2024    BUN 22 12/12/2023    CREATSERUM 1.26 (H) 08/28/2024    CREATSERUM 1.14 (H) 07/18/2024    CREATSERUM 1.13 (H) 12/12/2023    CA 9.1 08/28/2024    CA 9.2 07/18/2024    CA 9.2 12/12/2023    EGFRCR 43 (L) 08/28/2024    EGFRCR 48 (L) 07/18/2024    EGFRCR 45 (L) 01/18/2024    ALB 4.0 08/28/2024    ALB 4.1 07/18/2024    ALB 4.0 12/12/2023    PHOS 3.3 08/28/2024    PHOS 2.8 08/30/2023    PHOS 3.8 12/14/2021    .3 (H) 08/28/2024    PTH 99.5 (H) 08/30/2023          ASSESSMENT/PLAN:   Assessment   1. Stage 3 chronic kidney disease, unspecified whether stage 3a or 3b CKD (HCC)  - Renal Function Panel; Future  - CBC With Differential With Platelet; Future  - PTH, Intact; Future  - Protein/Creatinine Ratio, Urine Random; Future    2. Hypertensive kidney disease with stage 3 chronic kidney disease, unspecified whether stage 3a or 3b CKD (HCC)    3. Hypokalemia    4. Microscopic hematuria    5. 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 No proteinuria    Secondary HPTH  Ca,phosp ipth wnl    TIA  On ASA- stable    PLAN  Renal fx stable  BP acceptable  No nsaids  Labs and fu 1 yr        Orders This Visit:  Orders Placed This Encounter   Procedures    Renal Function Panel    CBC With Differential With Platelet    PTH, Intact    Protein/Creatinine Ratio, Urine Random       Meds This Visit:  Requested Prescriptions      No prescriptions requested or ordered in this encounter       Imaging & Referrals:  None       Judy Hernadez MD  9/3/2024

## 2024-09-25 ENCOUNTER — OFFICE VISIT (OUTPATIENT)
Dept: INTERNAL MEDICINE CLINIC | Facility: CLINIC | Age: 84
End: 2024-09-25

## 2024-09-25 VITALS
WEIGHT: 167 LBS | SYSTOLIC BLOOD PRESSURE: 122 MMHG | DIASTOLIC BLOOD PRESSURE: 64 MMHG | HEART RATE: 90 BPM | BODY MASS INDEX: 30.73 KG/M2 | OXYGEN SATURATION: 95 % | HEIGHT: 62 IN | TEMPERATURE: 98 F

## 2024-09-25 DIAGNOSIS — R05.3 CHRONIC COUGH: Primary | ICD-10-CM

## 2024-09-25 DIAGNOSIS — R06.09 DOE (DYSPNEA ON EXERTION): ICD-10-CM

## 2024-09-25 PROBLEM — R05.9 COUGH: Status: ACTIVE | Noted: 2024-09-25

## 2024-09-25 PROCEDURE — 99214 OFFICE O/P EST MOD 30 MIN: CPT | Performed by: INTERNAL MEDICINE

## 2024-09-25 NOTE — PROGRESS NOTES
Naomi Razo is a 83 year old female.  HPI:   She has had mild cough for about 6 mo - occas can bring up a small amount of clear mucous. No fever or chills. Last week or two mild dyspnea - no chest pain. CT chest 7/30 - stable 1.1 cm ground glass nodule RUL     Otherwise doing well.     SR: no chest pain,  no gu or gi sx.    BP Readings from Last 6 Encounters:   09/25/24 122/64   09/03/24 120/60   07/23/24 104/68   07/15/24 131/58   01/18/24 134/72   12/12/23 120/70     Wt Readings from Last 6 Encounters:   09/25/24 167 lb (75.8 kg)   09/03/24 170 lb (77.1 kg)   07/23/24 170 lb 9.6 oz (77.4 kg)   12/12/23 165 lb (74.8 kg)   08/30/23 159 lb (72.1 kg)   07/19/23 155 lb (70.3 kg)     Current Outpatient Medications   Medication Sig Dispense Refill    ATORVASTATIN 20 MG Oral Tab TAKE 1 TABLET(20 MG) BY MOUTH EVERY NIGHT 90 tablet 3    metoprolol succinate  MG Oral Tablet 24 Hr TAKE 1 TABLET(100 MG) BY MOUTH DAILY 90 tablet 0    NIFEdipine ER 60 MG Oral Tablet 24 Hr TAKE 1 TABLET(60 MG) BY MOUTH DAILY 90 tablet 0    FAMOTIDINE 40 MG Oral Tab TAKE 1 TABLET(40 MG) BY MOUTH TWICE DAILY 180 tablet 3    POTASSIUM CHLORIDE 10 MEQ Oral Tab CR TAKE 2 TABLETS(20 MEQ) BY MOUTH EVERY  tablet 3    nystatin 100,000 Units/g External Ointment APPLY SMALL AMOUNT TO THE AFFECTED AREA 2 TO 3 TIMES DAILY. AS NEEDED 30 g 1    aspirin 81 MG Oral Chew Tab Chew 1 tablet (81 mg total) by mouth daily. 30 tablet 0    Multiple Vitamins-Minerals (CENTRUM ULTRA WOMENS) Oral Tab Take 1 tablet by mouth daily.      Calcium Carbonate-Vitamin D (CALCIUM 600 + D OR) Take 1 tablet by mouth 2 (two) times daily.          Past Medical History:    Dougherty's esophagus    Chronic neck pain    DVT (deep venous thrombosis) (HCC)    Hematuria    w/u    High blood pressure    High cholesterol    High level of uric acid in blood    10.4    Pulmonary emboli (HCC)    Pulmonary embolism (HCC)    Renal disorder    Renal insufficiency    S/P colonoscopy     Stroke (HCC)    TIA    Thalamic stroke (HCC)      Social History:  Social History     Socioeconomic History    Marital status:    Tobacco Use    Smoking status: Never     Passive exposure: Never    Smokeless tobacco: Never   Vaping Use    Vaping status: Never Used   Substance and Sexual Activity    Alcohol use: No    Drug use: No   Other Topics Concern    Caffeine Concern No        REVIEW OF SYSTEMS:   GENERAL HEALTH: feels well otherwise  SKIN: denies any unusual skin lesions or rashes  RESPIRATORY: denies shortness of breath with exertion  CARDIOVASCULAR: denies chest pain on exertion  GI: denies abdominal pain and denies heartburn  NEURO: denies headaches    EXAM:   /64   Pulse 90   Temp 98.1 °F (36.7 °C) (Oral)   Ht 5' 2\" (1.575 m)   Wt 167 lb (75.8 kg)   SpO2 95%   BMI 30.54 kg/m²   GENERAL: well developed, well nourished,in no apparent distress  SKIN: no rashes,no suspicious lesions  HEENT: atraumatic, normocephalic,ears and throat are clear  NECK: supple,no adenopathy,no bruits  LUNGS: clear to auscultation  CARDIO: RRR without murmur  GI: good BS's,no masses, HSM or tenderness  EXTREMITIES: no cyanosis, clubbing or edema    ASSESSMENT AND PLAN:   1. Chronic cough  CT chest 7/30/24 - 1.1 cm GGO RUL. Repeat CT in 12 months     2. KEITH (dyspnea on exertion)  Needs 2D echo and nuclear GXT.     The patient indicates understanding of these issues and agrees to the plan.  The patient is asked to return in 4 mo or sooner as needed

## 2024-09-26 ENCOUNTER — HOSPITAL ENCOUNTER (OUTPATIENT)
Dept: CV DIAGNOSTICS | Facility: HOSPITAL | Age: 84
Discharge: HOME | End: 2024-09-26
Attending: INTERNAL MEDICINE
Payer: MEDICARE

## 2024-09-26 DIAGNOSIS — R05.3 CHRONIC COUGH: ICD-10-CM

## 2024-09-26 DIAGNOSIS — R06.09 DOE (DYSPNEA ON EXERTION): ICD-10-CM

## 2024-09-26 PROCEDURE — 93306 TTE W/DOPPLER COMPLETE: CPT | Performed by: INTERNAL MEDICINE

## 2024-09-27 ENCOUNTER — TELEPHONE (OUTPATIENT)
Dept: INTERNAL MEDICINE CLINIC | Facility: CLINIC | Age: 84
End: 2024-09-27

## 2024-09-27 NOTE — TELEPHONE ENCOUNTER
Copy and pasted echocardiogram results below for reference.    (  Conclusions:     1. Left ventricle: The cavity size was normal. Wall thickness was mildly      increased. Systolic function was normal. The estimated ejection fraction      was 60-65%, by biplane method of disks. Wall motion is normal; there are      no regional wall motion abnormalities. Doppler parameters are consistent      with abnormal left ventricular relaxation - grade 1 diastolic      dysfunction.   2. Mitral valve: There was mild regurgitation.   3. Pulmonary arteries: Systolic pressure was mildly to moderately increased,      estimated to be 49mm Hg.   Impressions:  No previous study was available for comparison. )    We can let Naomi know that I was forwarded her message in the absence of Dr. Deleon.    The echocardiogram generally looks good.    Her heart muscle function is good.    It looks like Dr. Deleon ordered this for some shortness of breath with activity.     It looks like he ordered a stress test and we can wait for those results before coming to a final conclusion.    There is a little \"stiffness\" in her heart muscle which is common and I do not think is significant.    There is just a little bit of mild leakiness of a valve called mitral valve.  I do not think this is significant    One artery call the pulmonary artery pressure was mildly increased.    Sometimes can be due to sleep apnea.

## 2024-09-27 NOTE — TELEPHONE ENCOUNTER
Patient is calling for the results of her Echo Cardiogram done on 9/26  Dr Deleon instructed patient to call the next day he will review the results when he comes back into the office    Phone [phone]

## 2024-10-01 ENCOUNTER — HOSPITAL ENCOUNTER (OUTPATIENT)
Dept: NUCLEAR MEDICINE | Facility: HOSPITAL | Age: 84
Discharge: HOME | End: 2024-10-01
Attending: INTERNAL MEDICINE
Payer: MEDICARE

## 2024-10-01 ENCOUNTER — HOSPITAL ENCOUNTER (OUTPATIENT)
Dept: CV DIAGNOSTICS | Facility: HOSPITAL | Age: 84
Discharge: HOME | End: 2024-10-01
Attending: INTERNAL MEDICINE
Payer: MEDICARE

## 2024-10-01 DIAGNOSIS — R05.3 CHRONIC COUGH: ICD-10-CM

## 2024-10-01 DIAGNOSIS — R06.09 DOE (DYSPNEA ON EXERTION): ICD-10-CM

## 2024-10-01 PROCEDURE — 93016 CV STRESS TEST SUPVJ ONLY: CPT | Performed by: INTERNAL MEDICINE

## 2024-10-01 PROCEDURE — 78452 HT MUSCLE IMAGE SPECT MULT: CPT | Performed by: INTERNAL MEDICINE

## 2024-10-01 PROCEDURE — 93017 CV STRESS TEST TRACING ONLY: CPT | Performed by: INTERNAL MEDICINE

## 2024-10-01 PROCEDURE — 93018 CV STRESS TEST I&R ONLY: CPT | Performed by: INTERNAL MEDICINE

## 2024-10-01 RX ORDER — REGADENOSON 0.08 MG/ML
INJECTION, SOLUTION INTRAVENOUS
Status: COMPLETED
Start: 2024-10-01 | End: 2024-10-01

## 2024-10-01 RX ADMIN — REGADENOSON 0.4 MG: 0.08 INJECTION, SOLUTION INTRAVENOUS at 08:47:00

## 2024-10-02 LAB
% OF MAX PREDICTED HR: 100 %
MAX DIASTOLIC BP: 70 MMHG
MAX HEART RATE: 86 BPM
MAX PREDICTED HEART RATE: 139 BPM
MAX SYSTOLIC BP: 140 MMHG
MAX WORK LOAD: 10

## 2024-10-04 ENCOUNTER — TELEPHONE (OUTPATIENT)
Dept: INTERNAL MEDICINE CLINIC | Facility: CLINIC | Age: 84
End: 2024-10-04

## 2024-10-04 NOTE — TELEPHONE ENCOUNTER
Nuclear treadmill normal. No evidence for cardiac cause to cough or dyspnea. If not doing well then f/u with Dr Monique in 1-2 mo.

## 2024-10-09 RX ORDER — METOPROLOL SUCCINATE 100 MG/1
TABLET, EXTENDED RELEASE ORAL
Qty: 90 TABLET | Refills: 3 | Status: SHIPPED | OUTPATIENT
Start: 2024-10-09

## 2024-10-09 RX ORDER — NIFEDIPINE 60 MG/1
TABLET, EXTENDED RELEASE ORAL
Qty: 90 TABLET | Refills: 3 | Status: SHIPPED | OUTPATIENT
Start: 2024-10-09

## 2024-10-09 NOTE — TELEPHONE ENCOUNTER
Refill request is for a maintenance medication and has met the criteria specified in the Ambulatory Medication Refill Standing Order for eligibility, visits, laboratory, alerts and was sent to the requested pharmacy.    Requested Prescriptions     Signed Prescriptions Disp Refills    metoprolol succinate  MG Oral Tablet 24 Hr 90 tablet 3     Sig: TAKE 1 TABLET(100 MG) BY MOUTH DAILY     Authorizing Provider: MARY IVY     Ordering User: JULIA MYRICK    NIFEdipine ER 60 MG Oral Tablet 24 Hr 90 tablet 3     Sig: TAKE 1 TABLET(60 MG) BY MOUTH DAILY     Authorizing Provider: MARY IVY     Ordering User: JULIA MYRICK

## 2024-10-25 ENCOUNTER — TELEPHONE (OUTPATIENT)
Dept: INTERNAL MEDICINE CLINIC | Facility: CLINIC | Age: 84
End: 2024-10-25

## 2024-10-25 ENCOUNTER — HOSPITAL ENCOUNTER (OUTPATIENT)
Dept: NUCLEAR MEDICINE | Facility: HOSPITAL | Age: 84
Discharge: HOME | End: 2024-10-25
Attending: INTERNAL MEDICINE
Payer: MEDICARE

## 2024-10-25 ENCOUNTER — LAB ENCOUNTER (OUTPATIENT)
Dept: LAB | Facility: HOSPITAL | Age: 84
End: 2024-10-25
Attending: INTERNAL MEDICINE
Payer: MEDICARE

## 2024-10-25 ENCOUNTER — HOSPITAL ENCOUNTER (OUTPATIENT)
Dept: ULTRASOUND IMAGING | Facility: HOSPITAL | Age: 84
Discharge: HOME | End: 2024-10-25
Attending: INTERNAL MEDICINE
Payer: MEDICARE

## 2024-10-25 ENCOUNTER — HOSPITAL ENCOUNTER (OUTPATIENT)
Dept: GENERAL RADIOLOGY | Facility: HOSPITAL | Age: 84
Discharge: HOME | End: 2024-10-25
Attending: INTERNAL MEDICINE
Payer: MEDICARE

## 2024-10-25 ENCOUNTER — OFFICE VISIT (OUTPATIENT)
Dept: INTERNAL MEDICINE CLINIC | Facility: CLINIC | Age: 84
End: 2024-10-25

## 2024-10-25 VITALS
SYSTOLIC BLOOD PRESSURE: 122 MMHG | HEIGHT: 62 IN | OXYGEN SATURATION: 97 % | BODY MASS INDEX: 31.72 KG/M2 | TEMPERATURE: 98 F | DIASTOLIC BLOOD PRESSURE: 58 MMHG | WEIGHT: 172.38 LBS | HEART RATE: 70 BPM

## 2024-10-25 DIAGNOSIS — I82.402 DEEP VEIN THROMBOSIS (DVT) OF LEFT LOWER EXTREMITY, UNSPECIFIED CHRONICITY, UNSPECIFIED VEIN (HCC): ICD-10-CM

## 2024-10-25 DIAGNOSIS — M79.662 PAIN AND SWELLING OF LEFT LOWER LEG: ICD-10-CM

## 2024-10-25 DIAGNOSIS — I10 HYPERTENSION, BENIGN: ICD-10-CM

## 2024-10-25 DIAGNOSIS — R06.09 DOE (DYSPNEA ON EXERTION): ICD-10-CM

## 2024-10-25 DIAGNOSIS — R05.3 CHRONIC COUGH: Primary | ICD-10-CM

## 2024-10-25 DIAGNOSIS — R06.09 DOE (DYSPNEA ON EXERTION): Primary | ICD-10-CM

## 2024-10-25 DIAGNOSIS — R91.1 PULMONARY NODULE: ICD-10-CM

## 2024-10-25 DIAGNOSIS — M79.89 PAIN AND SWELLING OF LEFT LOWER LEG: ICD-10-CM

## 2024-10-25 LAB
ANION GAP SERPL CALC-SCNC: 7 MMOL/L (ref 0–18)
BASOPHILS # BLD AUTO: 0.04 X10(3) UL (ref 0–0.2)
BASOPHILS NFR BLD AUTO: 0.5 %
BUN BLD-MCNC: 25 MG/DL (ref 9–23)
BUN/CREAT SERPL: 19.2 (ref 10–20)
CALCIUM BLD-MCNC: 9.4 MG/DL (ref 8.7–10.4)
CHLORIDE SERPL-SCNC: 111 MMOL/L (ref 98–112)
CO2 SERPL-SCNC: 26 MMOL/L (ref 21–32)
CREAT BLD-MCNC: 1.3 MG/DL
DEPRECATED RDW RBC AUTO: 45.2 FL (ref 35.1–46.3)
EGFRCR SERPLBLD CKD-EPI 2021: 41 ML/MIN/1.73M2 (ref 60–?)
EOSINOPHIL # BLD AUTO: 0.22 X10(3) UL (ref 0–0.7)
EOSINOPHIL NFR BLD AUTO: 2.6 %
ERYTHROCYTE [DISTWIDTH] IN BLOOD BY AUTOMATED COUNT: 12.9 % (ref 11–15)
FASTING STATUS PATIENT QL REPORTED: NO
GLUCOSE BLD-MCNC: 103 MG/DL (ref 70–99)
HCT VFR BLD AUTO: 41.1 %
HGB BLD-MCNC: 13.7 G/DL
IMM GRANULOCYTES # BLD AUTO: 0.03 X10(3) UL (ref 0–1)
IMM GRANULOCYTES NFR BLD: 0.3 %
LYMPHOCYTES # BLD AUTO: 2.87 X10(3) UL (ref 1–4)
LYMPHOCYTES NFR BLD AUTO: 33.4 %
MCH RBC QN AUTO: 31.6 PG (ref 26–34)
MCHC RBC AUTO-ENTMCNC: 33.3 G/DL (ref 31–37)
MCV RBC AUTO: 94.9 FL
MONOCYTES # BLD AUTO: 0.72 X10(3) UL (ref 0.1–1)
MONOCYTES NFR BLD AUTO: 8.4 %
NEUTROPHILS # BLD AUTO: 4.7 X10 (3) UL (ref 1.5–7.7)
NEUTROPHILS # BLD AUTO: 4.7 X10(3) UL (ref 1.5–7.7)
NEUTROPHILS NFR BLD AUTO: 54.8 %
OSMOLALITY SERPL CALC.SUM OF ELEC: 303 MOSM/KG (ref 275–295)
PLATELET # BLD AUTO: 335 10(3)UL (ref 150–450)
POTASSIUM SERPL-SCNC: 4 MMOL/L (ref 3.5–5.1)
RBC # BLD AUTO: 4.33 X10(6)UL
SODIUM SERPL-SCNC: 144 MMOL/L (ref 136–145)
WBC # BLD AUTO: 8.6 X10(3) UL (ref 4–11)

## 2024-10-25 PROCEDURE — 85025 COMPLETE CBC W/AUTO DIFF WBC: CPT

## 2024-10-25 PROCEDURE — 99215 OFFICE O/P EST HI 40 MIN: CPT | Performed by: INTERNAL MEDICINE

## 2024-10-25 PROCEDURE — 71046 X-RAY EXAM CHEST 2 VIEWS: CPT | Performed by: INTERNAL MEDICINE

## 2024-10-25 PROCEDURE — 36415 COLL VENOUS BLD VENIPUNCTURE: CPT

## 2024-10-25 PROCEDURE — 80048 BASIC METABOLIC PNL TOTAL CA: CPT

## 2024-10-25 PROCEDURE — 78582 LUNG VENTILAT&PERFUS IMAGING: CPT | Performed by: INTERNAL MEDICINE

## 2024-10-25 PROCEDURE — 93971 EXTREMITY STUDY: CPT | Performed by: INTERNAL MEDICINE

## 2024-10-25 NOTE — TELEPHONE ENCOUNTER
To ZENIA Uagrte...    Called and spoke with Brittanie/ US dept  x [phone] and pt has STAT venous doppler of Lt lower extremity scheduled for 12:30 PM today. Pt aware and is on her way to the hospital for doppler study. Provided Jania with Dr Joselin SOMMERS# and will call with abnormal results

## 2024-10-25 NOTE — PROGRESS NOTES
Naomi Razo is a 84 year old female.  HPI:     Chief Complaint   Patient presents with    Cough     Presents with intermittent, productive cough for past 1 year ( clear mucus).  No fevers or other symptoms      Naomi presents for evaluation of a cough.  She indicates that she is had a cough on and off for about 1 year.  She does have some dyspnea on exertion that is mild.  Such as walking her dog or going upstairs.  No chest pain.    She did see Dr. Deleon September 25, 2024.  This was noted at that time also.  I did review his office visit note indicating chronic cough and dyspnea on exertion.    A nuclear stress test done October 1 showed normal regadenoson myocardial perfusion study.  Stress EKG showed normal regadenoson EKG.    Echocardiogram showed ejection fraction 60 to 65%.  There were no regional wall motion abnormalities.  There was grade 1 diastolic dysfunction.  Systolic pressure mildly to moderately increased.    She does have a remote history of pulmonary embolism back in 2015.  She does have arthritis in her knees.  Especially the left side.  She will get pain in back of her left knee and in the upper calf.  She does wear support hose for lower extremity swelling.  Current Outpatient Medications   Medication Sig Dispense Refill    metoprolol succinate  MG Oral Tablet 24 Hr TAKE 1 TABLET(100 MG) BY MOUTH DAILY 90 tablet 3    NIFEdipine ER 60 MG Oral Tablet 24 Hr TAKE 1 TABLET(60 MG) BY MOUTH DAILY 90 tablet 3    ATORVASTATIN 20 MG Oral Tab TAKE 1 TABLET(20 MG) BY MOUTH EVERY NIGHT 90 tablet 3    FAMOTIDINE 40 MG Oral Tab TAKE 1 TABLET(40 MG) BY MOUTH TWICE DAILY 180 tablet 3    POTASSIUM CHLORIDE 10 MEQ Oral Tab CR TAKE 2 TABLETS(20 MEQ) BY MOUTH EVERY  tablet 3    aspirin 81 MG Oral Chew Tab Chew 1 tablet (81 mg total) by mouth daily. 30 tablet 0    Multiple Vitamins-Minerals (CENTRUM ULTRA WOMENS) Oral Tab Take 1 tablet by mouth daily.      Calcium Carbonate-Vitamin D (CALCIUM 600  + D OR) Take 1 tablet by mouth 2 (two) times daily.        nystatin 100,000 Units/g External Ointment APPLY SMALL AMOUNT TO THE AFFECTED AREA 2 TO 3 TIMES DAILY. AS NEEDED (Patient not taking: Reported on 10/25/2024) 30 g 1      Past Medical History:    Dougherty's esophagus    Chronic neck pain    DVT (deep venous thrombosis) (HCC)    Hematuria    w/u    High blood pressure    High cholesterol    High level of uric acid in blood    10.4    Pulmonary emboli (HCC)    Pulmonary embolism (HCC)    Renal disorder    Renal insufficiency    S/P colonoscopy    Stroke (HCC)    TIA    Thalamic stroke (HCC)      Social History:  Social History     Socioeconomic History    Marital status:    Tobacco Use    Smoking status: Never     Passive exposure: Never    Smokeless tobacco: Never   Vaping Use    Vaping status: Never Used   Substance and Sexual Activity    Alcohol use: No    Drug use: No   Other Topics Concern    Caffeine Concern No        REVIEW OF SYSTEMS:   GENERAL HEALTH:  feels well otherwise  RESPIRATORY:  see above  CARDIOVASCULAR:  Voices no chest pain on exertion   GI:   Voices no abdominal pain or changes of bowels   :Viices no urning or frequency of urination.  NEURO:  Voices no  headaches or dizziness    EXAM:   /58 (BP Location: Right arm, Patient Position: Sitting, Cuff Size: adult)   Pulse 70   Temp 97.9 °F (36.6 °C) (Oral)   Ht 5' 2\" (1.575 m)   Wt 172 lb 6.4 oz (78.2 kg)   SpO2 97%   BMI 31.53 kg/m²     GENERAL:  well developed, well nourished, in no apparent distress  HEENT: atraumatic.  Pharynx normal without exudate.  EYES:  PERRL. Sclera anicteric.  NECK:  Supple,  no adenopathy,    LUNGS:  clear to auscultation.  Effort normal  CARDIO:  RRR without murmur.   S1 and S2 normal  GI:  good BS's,  no masses,   HSM or tenderness  EXTREMITIES : Support hose in place.  However I do not appreciate any edema.  No calf tenderness.    ASSESSMENT AND PLAN:     1. KEITH (dyspnea on exertion)  Dyspnea  on exertion.  CT scan does show a 1.1 cm groundglass density nodule right upper lobe relative to chest CT from January 2024.  Continue 12 to 24-month chest CT surveillance.  She does have some pulmonary hypertension noted on echocardiogram as above.  We talked about options.  We talked about a trial of inhaler such as Advair.  We talked about PFTs.  In the end I thought it would be ulloa for her to see pulmonary for an accurate diagnosis and workup.  I did give her contact information to .    2. Pain and swelling of left lower leg  For safety sake we will get venous Doppler left leg.  - US VENOUS DOPPLER LEG LEFT - DIAG IMG (CPT=93971); Future    3. Pulmonary nodule  Follow-up CT scan per radiology 1 year which will be July 2025.  Will also await pulmonary evaluation    4. Hypertension, benign  Blood pressure under satisfactory control    5. Chronic cough  Chronic cough as above.  Etiology unclear.    This visit was 30 minutes.  I spent 10 minutes before visit preparing and reviewing old records.  Greater than 50% of the visit was engaged in counseling and review of past data.     The patient indicates understanding of these issues and agrees to the plan.    Dajuan Martel MD  10/25/2024  12:16 PM    Addendum October 25, 2024 2:53 PM    Venous Doppler left leg shows acute expansile DVT within 1 of 2 left peroneal veins and and 1 of 2 left posterior tibial veins but no extension into the popliteal vein.  No other DVT is identified.    I spoke to Naomi about this.  She does have some intermittent shortness of breath but nothing acute.  However I was concerned about PE.  She does have chronic kidney disease and was told not to get IV dye and I think a VQ scan would be sufficient and in fact a VQ scan was read as normal pulmonary perfusion.  No PE.  Her chest x-ray was normal.    Her Eliquis was called in at 5 mg 2 tablets twice a day for 7 days and then 1 tablet thereafter for the following 3 weeks.  Will work  with her drug insurance plan to see how we get follow-up Eliquis for her at a reasonable price.    I am going to have her see hematology to see the length of time we we will use Eliquis.    Discussed with Naomi.  She will see  mainly for the pulmonary artery hypertension that was found on echocardiogram and not so much for the distal DVT left extremity.  For that I am going to ask her to see hematology because I am not quite sure if she needs lifelong anticoagulation now that she had her blood clot in 2015 and now her distal DVT left leg.  I will ask hematology to see her within the next couple weeks and I will ask her to come to the office in the next couple weeks.    She knows to call if she gets any acute shortness of breath.  She verbalized understanding to all the above.  Note that Eliquis does not need to be adjusted with renal and sufficiency if retreating DVT.

## 2024-10-28 ENCOUNTER — TELEPHONE (OUTPATIENT)
Dept: INTERNAL MEDICINE CLINIC | Facility: CLINIC | Age: 84
End: 2024-10-28

## 2024-10-28 ENCOUNTER — TELEPHONE (OUTPATIENT)
Age: 84
End: 2024-10-28

## 2024-10-28 DIAGNOSIS — R06.09 DOE (DYSPNEA ON EXERTION): ICD-10-CM

## 2024-10-28 DIAGNOSIS — I82.402 DEEP VEIN THROMBOSIS (DVT) OF LEFT LOWER EXTREMITY, UNSPECIFIED CHRONICITY, UNSPECIFIED VEIN (HCC): ICD-10-CM

## 2024-10-28 NOTE — TELEPHONE ENCOUNTER
Spoke to patient appointment scheduled with Dr Martel on 11/1    Patient is scheduled to have an area of skin cancer removed on 10/31, patient is asking if being on the Eliquis will affect the procedure?    Please call patient [phone]

## 2024-10-28 NOTE — TELEPHONE ENCOUNTER
Sully, thank you for helping Friday as Naomi I believe was able to get her Eliquis first month free.    The reason for this message is to help with follow-up as if she did not have the free month the cost of the Eliquis would have been around $600.    Can we figure out if she can get the Eliquis from [facility].    Or can she call her insurance to see the cost of future Eliquis if she is in some sort of \"donut hole \"    Thank you very much.    I will be making arrangements to see her in the next 7 to 10 days for follow-up.

## 2024-10-28 NOTE — TELEPHONE ENCOUNTER
Patient contacted, notified to check with her dermatologist and let the dermatologist know that she is now on Eliquis to see how that is going to affect the surgery. Once they let her know patient to call  with that information.

## 2024-10-28 NOTE — TELEPHONE ENCOUNTER
Please call hematology at [phone] and ask if we could make an appointment for Naomi regarding recurrent blood clots.  She currently has a blood clot in her leg and is on treatment.    But would like hematology consultation going forward as to the length of time she should be on Eliquis.

## 2024-10-28 NOTE — TELEPHONE ENCOUNTER
Received a message from Dr Martel to schedule patient Friday November 1 at 9:30 for a follow up of her blood clot. Also please let patient know Sully will be calling her regarding her Eliquis    LMTCB

## 2024-10-28 NOTE — TELEPHONE ENCOUNTER
Called pt. And scheduled her to see Dr. Martel on Friday.  She also wanted Dr. Martel to know that she will be seeing Dr. Turpin on Thursday .

## 2024-10-28 NOTE — TELEPHONE ENCOUNTER
Spoke to pt - we reviewed all med issues, price, alternative, Part D, formulary, and importance of not missing med;  pt will call me back if it is unaffordable

## 2024-10-28 NOTE — TELEPHONE ENCOUNTER
please call patient and ask her to see me 9:30 AM this Friday.    This is for follow-up of her blood clot in the leg.    Please let her know that Sully will be calling her regarding Eliquis.

## 2024-10-28 NOTE — TELEPHONE ENCOUNTER
Pt is calling to schedule a new consult appt.    New Consult- Any  Referred by-Dr. Dajuan Martel  Reason-Blood clot in leg  Insurance-Medicare AB/BCBS     Please give pt a call back. Thank you.

## 2024-10-28 NOTE — TELEPHONE ENCOUNTER
We can let Naomi know that is a good question.    She should check with her dermatologist and let the dermatologist know that she is now on Eliquis to see how that is going to affect the surgery.    Most times superficial skin procedures do not require hold on Eliquis and I would not want her to hold Eliquis for now.    Please ask her to let us know what the dermatologist says regarding the Eliquis so we can keep well-informed

## 2024-10-31 ENCOUNTER — APPOINTMENT (OUTPATIENT)
Dept: HEMATOLOGY/ONCOLOGY | Facility: HOSPITAL | Age: 84
End: 2024-10-31
Attending: INTERNAL MEDICINE
Payer: MEDICARE

## 2024-11-01 ENCOUNTER — OFFICE VISIT (OUTPATIENT)
Dept: INTERNAL MEDICINE CLINIC | Facility: CLINIC | Age: 84
End: 2024-11-01

## 2024-11-01 VITALS
SYSTOLIC BLOOD PRESSURE: 126 MMHG | WEIGHT: 171 LBS | DIASTOLIC BLOOD PRESSURE: 78 MMHG | BODY MASS INDEX: 31.47 KG/M2 | HEART RATE: 78 BPM | HEIGHT: 62 IN | OXYGEN SATURATION: 96 % | TEMPERATURE: 98 F

## 2024-11-01 DIAGNOSIS — I82.402 DEEP VEIN THROMBOSIS (DVT) OF LEFT LOWER EXTREMITY, UNSPECIFIED CHRONICITY, UNSPECIFIED VEIN (HCC): Primary | ICD-10-CM

## 2024-11-01 DIAGNOSIS — R91.1 PULMONARY NODULE: ICD-10-CM

## 2024-11-01 DIAGNOSIS — R06.09 DOE (DYSPNEA ON EXERTION): ICD-10-CM

## 2024-11-01 DIAGNOSIS — R05.3 CHRONIC COUGH: ICD-10-CM

## 2024-11-01 DIAGNOSIS — N18.30 STAGE 3 CHRONIC KIDNEY DISEASE, UNSPECIFIED WHETHER STAGE 3A OR 3B CKD (HCC): ICD-10-CM

## 2024-11-01 DIAGNOSIS — I10 HYPERTENSION, BENIGN: ICD-10-CM

## 2024-11-01 PROCEDURE — 99214 OFFICE O/P EST MOD 30 MIN: CPT | Performed by: INTERNAL MEDICINE

## 2024-11-01 NOTE — PROGRESS NOTES
Naomi Razo is a 84 year old female.  HPI:     Chief Complaint   Patient presents with    Checkup      Naomi presents for follow-up.    Last week when I saw her Friday she had some pain in the back of her knee area and upper calf area.  We talked about this.  She does have bilateral knee pain.  She had felt that the pain in the back of her popliteal space and upper calf was due to her knee arthritis.    To be on the safe side we did a venous Doppler and surprisingly she had acute expansile DVT within the 1 of 2 left peroneal veins and with in the 1 of 2 left posterior tibial veins but no extension into the popliteal vein.    This was somewhat of a surprise to me.  With that said I placed her on Eliquis.  5 mg tablets.  To twice a day for the first 7 days and then 1 twice a day.    Thankfully she received her first month free.  She is working with her insurance to see cost of future refills.    Back in 2015 she had food poisoning.  She then developed a pulmonary embolism.  It was felt that it might have been due to decreased activity from her food poisoning.    Brings up the question of if she now should be given anticoagulation lifelong.    She has an appoint with  November 7 to answer that question.    The reason she came last week was for persistent cough on and off.  Also some shortness of breath.  At the time of her venous Doppler when her DVT was recognized I had her get a VQ scan and chest x-ray.  VQ scan was negative for PE.  Chest x-ray was unremarkable.    We talked about this today.  I do not have a good reason for her intermittent cough and dyspnea on exertion.  She has had a recent negative cardiac workup.  She does have a CT scan from July 30, 2024 that showed a stable 1.1 cm groundglass density nodule in the right upper lobe relative to CT chest January 2024 continue 12 to 24-month chest CT surveillance recommended.  Naomi does know about this.  It has already been ordered.    However  because of the intermittent coughing, dyspnea that I do not have a diagnosis for her and the abnormal CT I thought it would be wise for her to see pulmonary.  I have asked her to see .  She will call and make an appointment.    In January of this year she needed to wear an ankle brace for some tendon problems in her ankle.  I do not think that is responsible for her DVT since it is so many months ago.    It appears that this last DVT is \"unprovoked\".  The PE in 2015 associated with her \"food poisoning\" may or may not have been provoked.  Current Outpatient Medications   Medication Sig Dispense Refill    apixaban (ELIQUIS) 5 MG Oral Tab Take 2 tablets twice a day for 7 days and then 1 tablet twice a day thereafter 74 tablet 0    metoprolol succinate  MG Oral Tablet 24 Hr TAKE 1 TABLET(100 MG) BY MOUTH DAILY 90 tablet 3    NIFEdipine ER 60 MG Oral Tablet 24 Hr TAKE 1 TABLET(60 MG) BY MOUTH DAILY 90 tablet 3    ATORVASTATIN 20 MG Oral Tab TAKE 1 TABLET(20 MG) BY MOUTH EVERY NIGHT 90 tablet 3    FAMOTIDINE 40 MG Oral Tab TAKE 1 TABLET(40 MG) BY MOUTH TWICE DAILY 180 tablet 3    POTASSIUM CHLORIDE 10 MEQ Oral Tab CR TAKE 2 TABLETS(20 MEQ) BY MOUTH EVERY  tablet 3    nystatin 100,000 Units/g External Ointment APPLY SMALL AMOUNT TO THE AFFECTED AREA 2 TO 3 TIMES DAILY. AS NEEDED 30 g 1    aspirin 81 MG Oral Chew Tab Chew 1 tablet (81 mg total) by mouth daily. 30 tablet 0    Multiple Vitamins-Minerals (CENTRUM ULTRA WOMENS) Oral Tab Take 1 tablet by mouth daily.      Calcium Carbonate-Vitamin D (CALCIUM 600 + D OR) Take 1 tablet by mouth 2 (two) times daily.          Past Medical History:    Dougherty's esophagus    Chronic neck pain    DVT (deep venous thrombosis) ([facility])    Hematuria    w/u    High blood pressure    High cholesterol    High level of uric acid in blood    10.4    Pulmonary emboli (HCC)    Pulmonary embolism (HCC)    Renal disorder    Renal insufficiency    S/P colonoscopy    Stroke ([facility])     TIA    Thalamic stroke (HCC)      Social History:  Social History     Socioeconomic History    Marital status:    Tobacco Use    Smoking status: Never     Passive exposure: Never    Smokeless tobacco: Never   Vaping Use    Vaping status: Never Used   Substance and Sexual Activity    Alcohol use: No    Drug use: No   Other Topics Concern    Caffeine Concern No        REVIEW OF SYSTEMS:   GENERAL HEALTH:  feels well otherwise  RESPIRATORY:  Voices no shortness of breath with exertion or cough  CARDIOVASCULAR:  Voices no chest pain on exertion or shortness of breath  GI:   Voices no abdominal pain or changes of bowels   :Viices no urning or frequency of urination.  NEURO:  Voices no  headaches or dizziness    EXAM:   /78   Pulse 78   Temp 97.5 °F (36.4 °C)   Ht 5' 2\" (1.575 m)   Wt 171 lb (77.6 kg)   SpO2 96%   BMI 31.28 kg/m²     GENERAL:  well developed, well nourished, in no apparent distress  LUNGS:  clear to auscultation.  Effort normal  CARDIO:  RRR without murmur.   S1 and S2 normal  GI:  good BS's,  no masses,   HSM or tenderness  EXTREMITIES : no cyanosis, clubbing or edema.  Calves nontender.    ASSESSMENT AND PLAN:     1. Deep vein thrombosis (DVT) of left lower extremity, unspecified chronicity, unspecified vein (HCC)  Below the knee DVT diagnosed October 25, 2024.  I have chosen to give Naomi Josefina.  She will see  next week to determine length of time on Eliquis as this is her second episode of thrombosis.    2. KEITH (dyspnea on exertion)  Etiology unclear.  Recent cardiac evaluation unremarkable.  VQ scan done because of chronic kidney disease negative for PE.  Chest x-ray unremarkable.  She does have a groundglass opacity on CT chest.    3. Hypertension, benign  Blood pressure under satisfactory control    4. Chronic cough  Etiology unclear.  I have asked her to see pulmonary    5. Pulmonary nodule  Noted.  Plan is for 12-month follow-up but will ask her to review with  pulmonary.    6. Stage 3 chronic kidney disease, unspecified whether stage 3a or 3b CKD (HCC)  Stable.  She follows with nephrology.  .    This visit was 30 minutes.  I spent 10 minutes before visit preparing and reviewing old records.  Greater than 50% of the visit was engaged in counseling and review of past data.    We talked about follow-up.  Initially I thought a 1 month follow-up would be of value but since she will be seeing hematology along with pulmonary,  she does already have a appointment with Dr. Monique January 15, 2025    Therefore taking into account the above I think it is appropriate for next visit to be with Dr. Monique January 15,2025.  Naomi knows she can call me if there is any other urgent needs.  Will review consultation by Dr. Shaw.     The patient indicates understanding of these issues and agrees to the plan.    Dajuan Martel MD  11/1/2024  10:29 AM

## 2024-11-01 NOTE — TELEPHONE ENCOUNTER
Gio Virk.  Just a follow-up.    Naomi got her 1 month free Eliquis.    She also called \"Medicare\".  She indicated that the person on the phone told her that future prescriptions will be $45.  However I do not know if this would be a 30-day prescription or 90-day prescription.    She indicates that she is covered at this $45 rate \"until she gets to the donut [facility]\".  I discussed with her that I had thought it would be the opposite where if she was \"in the donut hole\" the cost of Eliquis would be more expensive.    I did tell her that I was not sure about how the donut hole works.    I suspect she may need lifelong Eliquis.  She will be seeing  November 7 to see what he says    Her Eliquis started October 25 so we have till November 25 for her first month free.    I told her that I would forward this information on to.  I told her that you may reach out to her for some more information as it relates to the cost of Eliquis.  Thank you.    Addendum: Sully, I placed order for 3 months of Eliquis with refills at least to have that ordered for her.  Thank you.

## 2024-11-01 NOTE — TELEPHONE ENCOUNTER
Thank you.   When pt and I spoke 10/28, we reviewed options and Jose Alejandro will call me if unaffordable

## 2024-11-07 ENCOUNTER — OFFICE VISIT (OUTPATIENT)
Dept: HEMATOLOGY/ONCOLOGY | Facility: HOSPITAL | Age: 84
End: 2024-11-07
Attending: INTERNAL MEDICINE
Payer: MEDICARE

## 2024-11-07 ENCOUNTER — TELEPHONE (OUTPATIENT)
Dept: INTERNAL MEDICINE CLINIC | Facility: CLINIC | Age: 84
End: 2024-11-07

## 2024-11-07 VITALS
WEIGHT: 170 LBS | OXYGEN SATURATION: 95 % | HEIGHT: 61 IN | HEART RATE: 65 BPM | BODY MASS INDEX: 32.1 KG/M2 | SYSTOLIC BLOOD PRESSURE: 134 MMHG | DIASTOLIC BLOOD PRESSURE: 54 MMHG | TEMPERATURE: 98 F | RESPIRATION RATE: 18 BRPM

## 2024-11-07 DIAGNOSIS — Z86.711 HISTORY OF PULMONARY EMBOLUS (PE): ICD-10-CM

## 2024-11-07 DIAGNOSIS — Z51.81 ANTICOAGULATION MANAGEMENT ENCOUNTER: ICD-10-CM

## 2024-11-07 DIAGNOSIS — Z79.01 ANTICOAGULATION MANAGEMENT ENCOUNTER: ICD-10-CM

## 2024-11-07 DIAGNOSIS — I82.402 ACUTE THROMBOEMBOLISM OF DEEP VEINS OF LEFT LOWER EXTREMITY (HCC): Primary | ICD-10-CM

## 2024-11-07 PROBLEM — I63.81 LEFT THALAMIC INFARCTION (HCC): Status: ACTIVE | Noted: 2020-07-23

## 2024-11-07 PROCEDURE — 99214 OFFICE O/P EST MOD 30 MIN: CPT | Performed by: INTERNAL MEDICINE

## 2024-11-07 NOTE — TELEPHONE ENCOUNTER
Gio Ventura.  That is a very good question.  Naomi is new to me as her primary care physician.  I scanned past EPIC notes and did find in July 2020 she had a left thalamic CVA.  She was seen by neurology.  The CVA was noted on MRI.    I copy and pasted 's note from neurology below for reference.      ASSESSMENT/PLAN:   Left thalamic stroke     Small vessel etiology.        - TTE     - for time being, allow permissive hypertension, do not treat SBP less than 180 or DBP less than 100 x total 48 hours     - aspirin 81 mg + Plavix 75 mg / day     - Statin for LDL goal less than 70        Neurology service will continue to follow.  Dr. Velazquez to assume care this afternoon.  Please page with any questions / concerns.     Maria Alejandra Pool MD  [facility]        Therefore I will continue her aspirin 81 mg a day along with Eliquis.  She is no longer on Plavix.  Thank you for bringing this to my attention and will include this in her past history section.    Ronaldo.

## 2024-11-07 NOTE — TELEPHONE ENCOUNTER
Please call Naomi.  Please let her know that I was made aware that she saw  from hematology.    He agrees with the long-term anticoagulation with Eliquis for 3 months at 5 mg twice a day and then after that to drop down to 2.5 mg twice a day    The reason for this message is the following:     did recommend against the use of a group of medications called NSAIDs which include ibuprofen, Motrin, Advil and Aleve.    2.   He did bring up the possibility of stopping aspirin but I was able to look back on your record and did note that July 2020 you did he have a \"thalamic\" stroke and it was recommended for you to take aspirin 81 mg a day.  Therefore I think we should continue the aspirin 81 mg a day even though you are on the Eliquis.    3.   In addition  please asked Naomi to stay on top of if she is going to be able to afford her future Eliquis.  We talked about this before and she was going to check with her insurance to make sure that future refills would not be to financially burdensome.

## 2024-11-07 NOTE — TELEPHONE ENCOUNTER
Lorenzo Shaw MD Kraman, David, MD Hello David, I saw this very nice patient of yours with the unprovoked left calf DVT.  I agree with you about the need for indefinite anticoagulation.  She is on aspirin 81 mg daily and I was wondering if you'd consider stopping it, to minimize her risk of bleeding. She has no prior CVA or CAD. Thanks!  Lorenzo

## 2024-11-07 NOTE — CONSULTS
[facility] Hematology  Consultation Note    Patient Name: Naomi Razo   YOB: 1940   Medical Record Number: [id number]   CSN: [id number]   Consulting Physician: Lorenzo Shaw MD  Referring Physician(s): Dajuan Martel MD   Date of Consultation: 11/7/2024     Reason for Consultation:    1. Acute thromboembolism of deep veins of left lower extremity (HCC)    2. History of pulmonary embolus (PE)    3. Anticoagulation management encounter        History of Present Illness:   Naomi Razo is a 84 year old female seen today in the Hematology Clinic  for LLE DVT.     She had presented to her PCP with pain in the left leg.  10/25/2024 venous Doppler showed an acute expansile DVT with an 1 left peroneal vein and 1 of 2 left posterior tibial veins but no extension the popliteal vein.  No other DVT was noted. 10/25/2024 VQ scan showed no evidence of PE.    The patient has a prior history of a pulmonary embolism in 2015 after she had developed some food poisoning.  She was on short-term anticoagulation with warfarin.  She denies any family history of venous thromboembolism.  No recent immobilization illness or surgery.  She usually walks 5000 steps daily with her dog.  Used to be about 12,000 steps but had had an injury earlier this year but several months prior to her DVT    She was started on apixaban and has been tolerating this well.  She is referred here for discussion regarding duration of anticoagulation.  No bleeding issues are described. No recurrence of leg pain. She complains of easy bruising but denies epistaxis, gum bleeding, hematuria, melana or BRBPR.  She denies family history of bleeding and clotting diathesis and recurrent miscarriages.        Past Medical History:  Past Medical History:    Dougherty's esophagus    Chronic neck pain    DVT (deep venous thrombosis) (HCC)    Hematuria    w/u    High blood pressure    High cholesterol    High level of uric acid in blood    10.4     Pulmonary emboli (HCC)    Pulmonary embolism (HCC)    Renal disorder    Renal insufficiency    S/P colonoscopy    Stroke (HCC)    TIA    Thalamic stroke (HCC)       Past Surgical History:  Past Surgical History:   Procedure Laterality Date    Colonoscopy  1/15, 11/10,     Colonoscopy  2019    Colonoscopy N/A 2019    Procedure: COLONOSCOPY;  Surgeon: Khari Elizondo MD;  Location: [facility] ENDOSCOPY    Colonoscopy  2024    Egd  , , ,     Inj tendon/ligament/cyst      Injection Tendon Sheath, Ligament    Vaginal hysterectomy         Family Medical History:  Family History   Problem Relation Age of Onset    Cancer Mother 54        lymphoma    Cancer Father         lung cancer    Hypertension Father     Glaucoma Father     Heart Disease Father         CAD    Diabetes Father     Other (Other) Paternal Grandmother         bunions    Cancer Maternal Grandfather         gastric cancer    Breast Cancer Paternal Aunt         60's       Gyne History:  OB History    Para Term  AB Living   2 2 0 0 0 0   SAB IAB Ectopic Multiple Live Births   0 0 0 0 0       Social History:  Social History     Socioeconomic History    Marital status:      Spouse name: Not on file    Number of children: Not on file    Years of education: Not on file    Highest education level: Not on file   Occupational History    Not on file   Tobacco Use    Smoking status: Never     Passive exposure: Never    Smokeless tobacco: Never   Vaping Use    Vaping status: Never Used   Substance and Sexual Activity    Alcohol use: No    Drug use: No    Sexual activity: Not on file   Other Topics Concern     Service Not Asked    Blood Transfusions Not Asked    Caffeine Concern No    Occupational Exposure Not Asked    Hobby Hazards Not Asked    Sleep Concern Not Asked    Stress Concern Not Asked    Weight Concern Not Asked    Special Diet Not Asked    Back Care Not Asked    Exercise Not Asked    Bike Helmet Not  Asked    Seat Belt Not Asked    Self-Exams Not Asked   Social History Narrative    Not on file     Social Drivers of Health     Financial Resource Strain: Not on file   Food Insecurity: Not on file   Transportation Needs: Not on file   Physical Activity: Not on file   Stress: Not on file   Social Connections: Not on file   Housing Stability: Not on file       Allergies:   Allergies[1]    Current Medications:  No outpatient medications have been marked as taking for the 11/7/24 encounter (Appointment) with Lorenzo Shaw MD.       Review of Systems:    Pertinent positives and negatives noted in the the HPI.     Physical Examination:  /54 (BP Location: Right arm, Patient Position: Sitting, Cuff Size: large)   Pulse 65   Temp 97.9 °F (36.6 °C) (Oral)   Resp 18   Ht 1.549 m (5' 1\")   Wt 77.1 kg (170 lb)   SpO2 95%   BMI 32.12 kg/m²     General: Patient is alert and oriented x 3, not in acute distress.  HEENT: EOMs intact. PERRL. Oropharynx is clear.   Neck: No JVD. No palpable lymphadenopathy. Neck is supple.  Healing incision from skin cancer removal  Lymphatics: There is no palpable lymphadenopathy throughout in the cervical, supraclavicular, axillary, or inguinal regions.  Chest: Clear to auscultation. No wheezes or rales.  Heart: Regular rate and rhythm. S1S2 normal.  Abdomen: Soft, non tender.  No hepatosplenomegaly.  No palpable mass.  Extremities: No edema or calf tenderness. Wearing compression stockings  Neurological: Grossly intact.       Labs:    Lab Results   Component Value Date/Time    WBC 8.6 10/25/2024 02:36 PM    RBC 4.33 10/25/2024 02:36 PM    HGB 13.7 10/25/2024 02:36 PM    HCT 41.1 10/25/2024 02:36 PM    MCV 94.9 10/25/2024 02:36 PM    MCH 31.6 10/25/2024 02:36 PM    MCHC 33.3 10/25/2024 02:36 PM    RDW 12.9 10/25/2024 02:36 PM    NEPRELIM 4.70 10/25/2024 02:36 PM    .0 10/25/2024 02:36 PM         Impression:    ICD-10-CM    1. Acute thromboembolism of deep veins of left lower  extremity (HCC)  I82.402       2. History of pulmonary embolus (PE)  Z86.711       3. Anticoagulation management encounter  Z51.81     Z79.01         84-year-old female with recent unprovoked left lower extremity DVT.  She also has a prior history of PE.  Given the unprovoked nature of her DVT risk of recurrence of anticoagulation is elevated.  I have therefore recommended she consider long-term anticoagulation therapy.      Plan:  Continue apixaban 5 mg twice daily for 3 months.  Will reassess in 3 months and if no bleeding issues are encountered would recommend indefinite anticoagulation with apixaban 2.5 mg twice daily [prophylactic dosing]   Recommended the need to avoid NSAIDs and aspirin therapy.  To reduce the risk of bleeding.  She will discuss with her PCP about the need for continued aspirin depending on her cardiac risk factors.  She does not have any prior history of coronary artery disease or CVA.  Thrombophilia workup is not indicated in her situation given her age and would not change her management    Thank you Dr Dajuan Martel for the opportunity to participate in the care of this interesting patient. Please do contact me if I may be of any further assistance    Lorenzo Shaw MD  [facility] Hematology/Oncology           [1]   Allergies  Allergen Reactions    Ibuprofen OTHER (SEE COMMENTS)     Kidney problems    Nsaids OTHER (SEE COMMENTS)     Kidney problems      Radiology Contrast Iodinated Dyes OTHER (SEE COMMENTS)     Kidney problems      Naproxen OTHER (SEE COMMENTS)    Azithromycin DIARRHEA

## 2024-11-07 NOTE — TELEPHONE ENCOUNTER
Lorenzo Shaw MD Kraman, David, MD  Caller: Unspecified (Today,  2:41 PM)  Interesting, the pt denied any prior CVA.  Your plan sounds good, Thank you for getting back to me so quickly.  Lorenzo.

## 2024-11-08 ENCOUNTER — HOSPITAL ENCOUNTER (EMERGENCY)
Facility: HOSPITAL | Age: 84
Discharge: HOME | End: 2024-11-08
Attending: EMERGENCY MEDICINE
Payer: MEDICARE

## 2024-11-08 ENCOUNTER — APPOINTMENT (OUTPATIENT)
Dept: ULTRASOUND IMAGING | Facility: HOSPITAL | Age: 84
End: 2024-11-08
Attending: EMERGENCY MEDICINE
Payer: MEDICARE

## 2024-11-08 VITALS
HEART RATE: 78 BPM | DIASTOLIC BLOOD PRESSURE: 85 MMHG | TEMPERATURE: 99 F | RESPIRATION RATE: 18 BRPM | OXYGEN SATURATION: 95 % | SYSTOLIC BLOOD PRESSURE: 119 MMHG

## 2024-11-08 DIAGNOSIS — I82.4Y2 DEEP VEIN THROMBOSIS (DVT) OF PROXIMAL VEIN OF LEFT LOWER EXTREMITY, UNSPECIFIED CHRONICITY (HCC): Primary | ICD-10-CM

## 2024-11-08 PROCEDURE — 93971 EXTREMITY STUDY: CPT | Performed by: EMERGENCY MEDICINE

## 2024-11-08 PROCEDURE — 99284 EMERGENCY DEPT VISIT MOD MDM: CPT

## 2024-11-09 NOTE — ED QUICK NOTES
Pt presents to ED with c/o pain to LLE. Pt was dx with a dvt and placed on anticoagulation. Pt states that the pain improved but yesterday she started to feel a dull pain and that today it got worse.

## 2024-11-09 NOTE — ED INITIAL ASSESSMENT (HPI)
Dx DVT on eliquis for past 2 weeks, sts pain/swelling to LLE is not improving.  Denies CP/CARLOS   Air/27 ga Isaías/17 ga Jeannine

## 2024-11-10 NOTE — ED PROVIDER NOTES
Patient Seen in: [facility] Emergency Department    History     Chief Complaint   Patient presents with    Deep Vein Thrombosis     Stated Complaint: leg pain, hx DVT    HPI    Patient complains of  swollen l leg.  Diagnosed with DVT over a week ago, taking her anticoagulation. no chest pain, no SOB.  Notes pain behind l knee.  no fever, no redness    Alleviating factors: none  Exacerbating factors: none    Past Medical History:    Dougherty's esophagus    Chronic neck pain    DVT (deep venous thrombosis) (HCC)    Hematuria    w/u    High blood pressure    High cholesterol    High level of uric acid in blood    10.4    Pulmonary emboli (HCC)    Pulmonary embolism (HCC)    Renal disorder    Renal insufficiency    S/P colonoscopy    Stroke (HCC)    TIA    Thalamic stroke (HCC)       Past Surgical History:   Procedure Laterality Date    Colonoscopy  1/15, 11/10, 11/06    Colonoscopy  09/2019    Colonoscopy N/A 09/27/2019    Procedure: COLONOSCOPY;  Surgeon: Khari Elizondo MD;  Location: [facility] ENDOSCOPY    Colonoscopy  02/2024    Egd  5/14, 4/12, 5/11, 9/08    Inj tendon/ligament/cyst      Injection Tendon Sheath, Ligament    Vaginal hysterectomy              Family History   Problem Relation Age of Onset    Cancer Mother 54        lymphoma    Cancer Father         lung cancer    Hypertension Father     Glaucoma Father     Heart Disease Father         CAD    Diabetes Father     Other (Other) Paternal Grandmother         bunions    Cancer Maternal Grandfather         gastric cancer    Breast Cancer Paternal Aunt         60's       Social History     Socioeconomic History    Marital status:    Tobacco Use    Smoking status: Never     Passive exposure: Never    Smokeless tobacco: Never   Vaping Use    Vaping status: Never Used   Substance and Sexual Activity    Alcohol use: No    Drug use: No   Other Topics Concern    Caffeine Concern No       Review of Systems    Positive for stated complaint: leg pain, hx  DVT  Other systems are as noted in HPI.  Constitutional and vital signs reviewed.      All other systems reviewed and negative except as noted above.    PSFH elements reviewed from today and agreed except as otherwise stated in HPI.    Physical Exam     ED Triage Vitals [11/08/24 1907]   /66   Pulse 80   Resp 16   Temp 98.8 °F (37.1 °C)   Temp src Oral   SpO2 95 %   O2 Device None (Room air)       Current:/85   Pulse 78   Temp 98.8 °F (37.1 °C) (Oral)   Resp 18   SpO2 95%    PULSE OX nl  GENERAL: awake laert no distress  HEAD: normocephalic, atraumatic,   EYES: PERRLA, EOMI, conj sclera clear    LUNGS: no resp distress,   CARDIO: RR    EXTREMITIES: l lower leg with swelling tender prox l calf  NEURO: alert and oiented *3, 2-12 intact, no focal deficit noted  SKIN:warm dry no rash  PSYCH: calm, cooperative,    Differential includes: DVT vs. Lymphedema vs. Venous insufficiency vs. Cellulitis vs. Bakers cyst      ED Course   Labs Reviewed - No data to display    MDM           Radiology Interpretation:  US VENOUS DOPPLER LEG LEFT - DIAG IMG (CPT=93971)    Result Date: 11/8/2024  CONCLUSION:   No significant change in the occlusive deep venous thrombus involving one of the paired posterior tibial veins and one of the paired peroneal veins.  No new or worsening deep venous thrombus.     Dictated by (CST): Edson Coates MD on 11/08/2024 at 9:40 PM     Finalized by (CST): Edson Coates MD on 11/08/2024 at 9:42 PM       \        Medical Decision Making  Problems Addressed:  Deep vein thrombosis (DVT) of proximal vein of left lower extremity, unspecified chronicity (HCC): chronic illness or injury with exacerbation, progression, or side effects of treatment     Details: Cont anticoagulation    Amount and/or Complexity of Data Reviewed  Radiology: ordered. Decision-making details documented in ED Course.  Discussion of management or test interpretation with external provider(s): Tylenol for  pain.    Risk  OTC drugs.  Prescription drug management.        Disposition and Plan     Clinical Impression:  1. Deep vein thrombosis (DVT) of proximal vein of left lower extremity, unspecified chronicity (HCC)        Disposition:  Discharge    Follow-up:  Susan Monique DO  [address] [place] [id number]  [phone]    Follow up        Medications Prescribed:  Discharge Medication List as of 11/8/2024  9:56 PM

## 2024-11-11 NOTE — TELEPHONE ENCOUNTER
Patient calling to inform Dr. Matrel that she went to ER Friday night 11/8/2024.  Asking that Dr. Martel please review ER visit and test results.    Patient is aware she has been transferred to Dr. Monique. She has not seen her yet, has appointment with her in January.    Dr. Martel has been advising patient on this issue.    Please call patient back.  Best call back number [phone]

## 2024-11-12 ENCOUNTER — TELEPHONE (OUTPATIENT)
Dept: INTERNAL MEDICINE CLINIC | Facility: CLINIC | Age: 84
End: 2024-11-12

## 2024-11-12 NOTE — TELEPHONE ENCOUNTER
Impression   CONCLUSION:     No significant change in the occlusive deep venous thrombus involving one of the paired posterior tibial veins and one of the paired peroneal veins.     No new or worsening deep venous thrombus.             Dictated by (CST): Edson Coates MD on 11/08/2024 at 9:40 PM      Finalized by (CST): Edson Coates MD on 11/08/2024 at 9:42 PM             Please let patient know that I was able to review her emergency room visit November 8.    As above there was no change in the blood clots.    I would not have expected any change in the Doppler exam with the Eliquis for 2 weeks.    Some people get what is called post phlebitis syndrome with pain and swelling but I do not think her blood clots were that large to cause that.    I am wondering if the pain she is experiencing is from the blood clots or could be from arthritis of her left knee.    For now there is nothing else to do other than continuing the Eliquis at her current dosage.    In terms of the stockings it is okay to continue to wear them.    I can see her this week if she wishes for me to double check her leg.

## 2024-11-12 NOTE — TELEPHONE ENCOUNTER
Pt. Has had a blood clot for 2 weeks.  She went to the ED on 11/8/24 for continued pain in her leg.  Dr. Martel should be able to see that.  Pt. Is asking how long does it take for the body to dissolve the blood clot?  She has been on Eloquis for 2 weeks.  Is it unusual to have continued pain?   She states she has not had any pain since Friday 11/8.She said she had been wearing compression stockings.  Should she still be wearing the stockings while having the blood clot or not?   Is it easier if she comes in for a visit?

## 2024-11-13 ENCOUNTER — TELEPHONE (OUTPATIENT)
Dept: INTERNAL MEDICINE CLINIC | Facility: CLINIC | Age: 84
End: 2024-11-13

## 2024-11-13 NOTE — TELEPHONE ENCOUNTER
Patient is calling to let Dr Martel know that Medicare will be calling him in the next day or so to discuss the patient getting an exemption for Eliquis  due to the cost    Patient wanted Dr Martel to be aware that they will be calling him.

## 2024-11-14 NOTE — TELEPHONE ENCOUNTER
Spoke to pt - we discussed pt deductible with insurance will have to be met now and in January again;  a tier exception is for different reasons and would not apply to her deductible.  She will have a $45 copay after and that will be affordable for the pt;  no further action required

## 2024-11-14 NOTE — TELEPHONE ENCOUNTER
Sully. Have you heard of this? Not sure what this means. Can you help making sure there is no interruption in patient's Eliquis. She has seen Dr. Shaw. Consultation in [facility]. Thank you.

## 2024-11-15 NOTE — TELEPHONE ENCOUNTER
I reviewed paperwork;   It is referring to what pt and I discussed;  a tier exception does not apply in this instance.  She will have to pay her deductible, which pt agrees to

## 2024-11-15 NOTE — TELEPHONE ENCOUNTER
Sully, anything more to do as it relates to the [employer] Medicare treatment information in red folder as indicated in telephone message?.  Thank you for all your help.

## 2024-11-20 ENCOUNTER — APPOINTMENT (OUTPATIENT)
Dept: ULTRASOUND IMAGING | Age: 84
End: 2024-11-20
Attending: PHYSICIAN ASSISTANT
Payer: MEDICARE

## 2024-11-20 ENCOUNTER — HOSPITAL ENCOUNTER (OUTPATIENT)
Age: 84
Discharge: HOME | End: 2024-11-20
Payer: MEDICARE

## 2024-11-20 VITALS
DIASTOLIC BLOOD PRESSURE: 70 MMHG | OXYGEN SATURATION: 97 % | TEMPERATURE: 98 F | SYSTOLIC BLOOD PRESSURE: 134 MMHG | HEART RATE: 67 BPM | RESPIRATION RATE: 16 BRPM

## 2024-11-20 DIAGNOSIS — I82.4Z2 ACUTE DEEP VEIN THROMBOSIS (DVT) OF DISTAL VEIN OF LEFT LOWER EXTREMITY (HCC): Primary | ICD-10-CM

## 2024-11-20 PROCEDURE — 99214 OFFICE O/P EST MOD 30 MIN: CPT

## 2024-11-20 PROCEDURE — 93971 EXTREMITY STUDY: CPT | Performed by: PHYSICIAN ASSISTANT

## 2024-11-20 NOTE — ED PROVIDER NOTES
Patient Seen in: [facility]      History     Chief Complaint   Patient presents with    Leg Pain     Stated Complaint: Left leg pain    Subjective:   HPI      84-year-old female with history as listed below including current DVT on Eliquis who presents to the immediate care with complaint of left leg pain and swelling.  Patient states the pain started yesterday evening, and is in the same place that she has had pain over the last few weeks.  She denies any complaint of chest pain or shortness of breath.  She has been compliant with her anticoagulation.    Objective:     Past Medical History:    Dougherty's esophagus    Chronic neck pain    DVT (deep venous thrombosis) (HCC)    Hematuria    w/u    High blood pressure    High cholesterol    High level of uric acid in blood    10.4    Pulmonary emboli (HCC)    Pulmonary embolism (HCC)    Renal disorder    Renal insufficiency    S/P colonoscopy    Stroke (HCC)    TIA    Thalamic stroke (HCC)              Past Surgical History:   Procedure Laterality Date    Colonoscopy  1/15, 11/10, 11/06    Colonoscopy  09/2019    Colonoscopy N/A 09/27/2019    Procedure: COLONOSCOPY;  Surgeon: Khari Elizondo MD;  Location: [facility] ENDOSCOPY    Colonoscopy  02/2024    Egd  5/14, 4/12, 5/11, 9/08    Inj tendon/ligament/cyst      Injection Tendon Sheath, Ligament    Vaginal hysterectomy                  Social History     Socioeconomic History    Marital status:    Tobacco Use    Smoking status: Never     Passive exposure: Never    Smokeless tobacco: Never   Vaping Use    Vaping status: Never Used   Substance and Sexual Activity    Alcohol use: No    Drug use: No   Other Topics Concern    Caffeine Concern No              Review of Systems    Positive for stated complaint: Left leg pain  Other systems are as noted in HPI.  Constitutional and vital signs reviewed.      All other systems reviewed and negative except as noted above.    Physical Exam     ED Triage Vitals  [11/20/24 1405]   /70   Pulse 67   Resp 16   Temp 98.3 °F (36.8 °C)   Temp src Temporal   SpO2 97 %   O2 Device None (Room air)       Current Vitals:   Vital Signs  BP: 134/70  Pulse: 67  Resp: 16  Temp: 98.3 °F (36.8 °C)  Temp src: Temporal    Oxygen Therapy  SpO2: 97 %  O2 Device: None (Room air)        Physical Exam  Vitals and nursing note reviewed.   Constitutional:       General: She is not in acute distress.  HENT:      Head: Normocephalic and atraumatic.      Right Ear: External ear normal.      Left Ear: External ear normal.      Nose: Nose normal.      Mouth/Throat:      Mouth: Mucous membranes are moist.   Eyes:      Extraocular Movements: Extraocular movements intact.      Pupils: Pupils are equal, round, and reactive to light.   Cardiovascular:      Rate and Rhythm: Normal rate.   Pulmonary:      Effort: Pulmonary effort is normal.   Abdominal:      General: Abdomen is flat.   Musculoskeletal:         General: Normal range of motion.      Cervical back: Normal range of motion.      Comments: There is no appreciable swelling to the L lower extremity. Calf is soft and compressible.    Skin:     General: Skin is warm.   Neurological:      General: No focal deficit present.      Mental Status: She is alert and oriented to person, place, and time.   Psychiatric:         Mood and Affect: Mood normal.         Behavior: Behavior normal.             ED Course   Labs Reviewed - No data to display     84-year-old female presenting with complaint of pain to the left lower extremity.  Patient has a known DVT diagnosed 4 weeks ago.  She was seen in the emergency department 11 days ago due to this same pain, repeat ultrasound at that time showed no change.  Ddx-DVT, cellulitis, Baker's cyst  Ultrasound today consistent with a subacute DVT.   I discussed continued Eliquis and follow-up with primary as scheduled.  ER return precautions reviewed         MDM              Medical Decision Making      Disposition and  Plan     Clinical Impression:  1. Acute deep vein thrombosis (DVT) of distal vein of left lower extremity (HCC)         Disposition:  Discharge  11/20/2024  3:45 pm    Follow-up:  Susan Monique DO  [address] [place] [address]  [phone]                Medications Prescribed:  Current Discharge Medication List              Supplementary Documentation:

## 2024-11-20 NOTE — ED INITIAL ASSESSMENT (HPI)
Patient arrives ambulatory with c/o leg pain and swelling x today. Patient reports she has 2 known DVTs diagnosed on 10/25. Currently on Eliquis and taking as directed. Reports the pain and swelling had mostly resolved, but returned this morning.

## 2025-01-08 ENCOUNTER — TELEPHONE (OUTPATIENT)
Dept: INTERNAL MEDICINE CLINIC | Facility: CLINIC | Age: 85
End: 2025-01-08

## 2025-01-08 DIAGNOSIS — R92.8 ABNORMAL SCREENING MAMMOGRAM: Primary | ICD-10-CM

## 2025-01-08 NOTE — TELEPHONE ENCOUNTER
Patient is calling she received a letter from [facility] stating she needs an order for an US of Left Breast    Will Dr Monique write an order for patient?    Patient is scheduled to see Dr Monique on 1/15/25    Please call patient once the order is complete   Phone [phone]

## 2025-01-09 ENCOUNTER — TELEPHONE (OUTPATIENT)
Dept: INTERNAL MEDICINE CLINIC | Facility: CLINIC | Age: 85
End: 2025-01-09

## 2025-01-09 DIAGNOSIS — I10 HYPERTENSION, BENIGN: Primary | ICD-10-CM

## 2025-01-09 NOTE — TELEPHONE ENCOUNTER
To Dr. Valdovinos     Please advise if you would like additional labs for patient prior to her appt

## 2025-01-09 NOTE — TELEPHONE ENCOUNTER
Please call patient to advise if she should have labs done before seeing Dr Monique on [address]  Patient's last labs were done on 10/25/24, ordered by Dr Martel

## 2025-01-10 ENCOUNTER — TELEPHONE (OUTPATIENT)
Dept: INTERNAL MEDICINE CLINIC | Facility: CLINIC | Age: 85
End: 2025-01-10

## 2025-01-10 ENCOUNTER — HOSPITAL ENCOUNTER (OUTPATIENT)
Dept: MAMMOGRAPHY | Facility: HOSPITAL | Age: 85
Discharge: HOME | End: 2025-01-10
Attending: INTERNAL MEDICINE
Payer: MEDICARE

## 2025-01-10 ENCOUNTER — HOSPITAL ENCOUNTER (OUTPATIENT)
Dept: ULTRASOUND IMAGING | Facility: HOSPITAL | Age: 85
Discharge: HOME | End: 2025-01-10
Attending: INTERNAL MEDICINE
Payer: MEDICARE

## 2025-01-10 DIAGNOSIS — R92.8 ABNORMAL SCREENING MAMMOGRAM: Primary | ICD-10-CM

## 2025-01-10 DIAGNOSIS — R92.8 ABNORMAL SCREENING MAMMOGRAM: ICD-10-CM

## 2025-01-10 PROCEDURE — 77065 DX MAMMO INCL CAD UNI: CPT | Performed by: INTERNAL MEDICINE

## 2025-01-10 PROCEDURE — 77061 BREAST TOMOSYNTHESIS UNI: CPT | Performed by: INTERNAL MEDICINE

## 2025-01-10 PROCEDURE — 76642 ULTRASOUND BREAST LIMITED: CPT | Performed by: INTERNAL MEDICINE

## 2025-01-10 NOTE — TELEPHONE ENCOUNTER
Spoke with patient and relayed MD's message. Verbalized understanding and agreement with plan.   Pt has contact information to schedule. Pt advised to call with any questions or discuss at upcoming appointment.     Your Appointments      Wednesday January 15, 2025 12:30 PM  Exam - Established with Susan Monique DO  [facility] ([facility]) [address] [address]  [phone]

## 2025-01-10 NOTE — TELEPHONE ENCOUNTER
Left detailed message for patient, ok per HIPAA, relaying that Dr Monique placed orders for fasting labs, fast for 12 hours prior, still ok to drink water and to call back if any further questions.

## 2025-01-10 NOTE — TELEPHONE ENCOUNTER
To nursing staff, please relay the following to Naomi Razo:    Mammogram results - birads 3 probably benign. Radiologist recommended repeat diagnostic mammogram and left breast ultrasound in 6 months, orders placed.    Thank you!

## 2025-01-13 ENCOUNTER — LAB ENCOUNTER (OUTPATIENT)
Dept: LAB | Facility: HOSPITAL | Age: 85
End: 2025-01-13
Attending: INTERNAL MEDICINE
Payer: MEDICARE

## 2025-01-13 DIAGNOSIS — I10 HYPERTENSION, BENIGN: ICD-10-CM

## 2025-01-13 LAB
ALBUMIN SERPL-MCNC: 4.1 G/DL (ref 3.2–4.8)
ALBUMIN/GLOB SERPL: 1.9 {RATIO} (ref 1–2)
ALP LIVER SERPL-CCNC: 114 U/L
ALT SERPL-CCNC: 12 U/L
ANION GAP SERPL CALC-SCNC: 8 MMOL/L (ref 0–18)
AST SERPL-CCNC: 17 U/L (ref ?–34)
BASOPHILS # BLD AUTO: 0.05 X10(3) UL (ref 0–0.2)
BASOPHILS NFR BLD AUTO: 0.6 %
BILIRUB SERPL-MCNC: 0.9 MG/DL (ref 0.2–1.1)
BUN BLD-MCNC: 22 MG/DL (ref 9–23)
BUN/CREAT SERPL: 17.6 (ref 10–20)
CALCIUM BLD-MCNC: 9.4 MG/DL (ref 8.7–10.4)
CHLORIDE SERPL-SCNC: 109 MMOL/L (ref 98–112)
CHOLEST SERPL-MCNC: 134 MG/DL (ref ?–200)
CO2 SERPL-SCNC: 28 MMOL/L (ref 21–32)
CREAT BLD-MCNC: 1.25 MG/DL
DEPRECATED RDW RBC AUTO: 43.7 FL (ref 35.1–46.3)
EGFRCR SERPLBLD CKD-EPI 2021: 43 ML/MIN/1.73M2 (ref 60–?)
EOSINOPHIL # BLD AUTO: 0.55 X10(3) UL (ref 0–0.7)
EOSINOPHIL NFR BLD AUTO: 7 %
ERYTHROCYTE [DISTWIDTH] IN BLOOD BY AUTOMATED COUNT: 12.7 % (ref 11–15)
FASTING PATIENT LIPID ANSWER: YES
FASTING STATUS PATIENT QL REPORTED: YES
GLOBULIN PLAS-MCNC: 2.2 G/DL (ref 2–3.5)
GLUCOSE BLD-MCNC: 96 MG/DL (ref 70–99)
HCT VFR BLD AUTO: 37 %
HDLC SERPL-MCNC: 57 MG/DL (ref 40–59)
HGB BLD-MCNC: 13.1 G/DL
IMM GRANULOCYTES # BLD AUTO: 0.02 X10(3) UL (ref 0–1)
IMM GRANULOCYTES NFR BLD: 0.3 %
LDLC SERPL CALC-MCNC: 64 MG/DL (ref ?–100)
LYMPHOCYTES # BLD AUTO: 2.44 X10(3) UL (ref 1–4)
LYMPHOCYTES NFR BLD AUTO: 30.9 %
MCH RBC QN AUTO: 32.9 PG (ref 26–34)
MCHC RBC AUTO-ENTMCNC: 35.4 G/DL (ref 31–37)
MCV RBC AUTO: 93 FL
MONOCYTES # BLD AUTO: 1.06 X10(3) UL (ref 0.1–1)
MONOCYTES NFR BLD AUTO: 13.4 %
NEUTROPHILS # BLD AUTO: 3.78 X10 (3) UL (ref 1.5–7.7)
NEUTROPHILS # BLD AUTO: 3.78 X10(3) UL (ref 1.5–7.7)
NEUTROPHILS NFR BLD AUTO: 47.8 %
NONHDLC SERPL-MCNC: 77 MG/DL (ref ?–130)
OSMOLALITY SERPL CALC.SUM OF ELEC: 303 MOSM/KG (ref 275–295)
PLATELET # BLD AUTO: 265 10(3)UL (ref 150–450)
POTASSIUM SERPL-SCNC: 3.8 MMOL/L (ref 3.5–5.1)
PROT SERPL-MCNC: 6.3 G/DL (ref 5.7–8.2)
RBC # BLD AUTO: 3.98 X10(6)UL
SODIUM SERPL-SCNC: 145 MMOL/L (ref 136–145)
TRIGL SERPL-MCNC: 62 MG/DL (ref 30–149)
TSI SER-ACNC: 2.96 UIU/ML (ref 0.55–4.78)
VLDLC SERPL CALC-MCNC: 9 MG/DL (ref 0–30)
WBC # BLD AUTO: 7.9 X10(3) UL (ref 4–11)

## 2025-01-13 PROCEDURE — 36415 COLL VENOUS BLD VENIPUNCTURE: CPT

## 2025-01-13 PROCEDURE — 85025 COMPLETE CBC W/AUTO DIFF WBC: CPT

## 2025-01-13 PROCEDURE — 80053 COMPREHEN METABOLIC PANEL: CPT

## 2025-01-13 PROCEDURE — 84443 ASSAY THYROID STIM HORMONE: CPT

## 2025-01-13 PROCEDURE — 80061 LIPID PANEL: CPT

## 2025-01-15 ENCOUNTER — OFFICE VISIT (OUTPATIENT)
Dept: INTERNAL MEDICINE CLINIC | Facility: CLINIC | Age: 85
End: 2025-01-15

## 2025-01-15 VITALS
SYSTOLIC BLOOD PRESSURE: 126 MMHG | WEIGHT: 172.5 LBS | HEART RATE: 78 BPM | OXYGEN SATURATION: 98 % | DIASTOLIC BLOOD PRESSURE: 80 MMHG | BODY MASS INDEX: 32.57 KG/M2 | HEIGHT: 61 IN

## 2025-01-15 DIAGNOSIS — I10 HYPERTENSION, BENIGN: Primary | ICD-10-CM

## 2025-01-15 PROCEDURE — 99215 OFFICE O/P EST HI 40 MIN: CPT | Performed by: INTERNAL MEDICINE

## 2025-01-15 NOTE — PROGRESS NOTES
Naomi Razo is a 84 year old female.  Chief Complaint   Patient presents with    [facility]     4 month checkup; Previous patient of Dr. Deleon      HPI:   Naomi Razo is a 84 year old female who presents for a check up.    LOV w/previous PCP Dr. Deleon was 9/25/24.    Since, she has been doing okay.    Had BCC on neck, removed by derm Dr. Palacio.    Had acute DVT LLE 10/25/24, hx of PE. Seen by heme planning to follow up next month. On eliquis, denies bruising/bleeding.    Having dental work - half of her tooth fell out a few months ago.    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    Wt Readings from Last 6 Encounters:   01/15/25 172 lb 8 oz (78.2 kg)   11/07/24 170 lb (77.1 kg)   11/01/24 171 lb (77.6 kg)   10/25/24 172 lb 6.4 oz (78.2 kg)   09/25/24 167 lb (75.8 kg)   09/03/24 170 lb (77.1 kg)     Body mass index is 32.59 kg/m².     Current Outpatient Medications   Medication Sig Dispense Refill    apixaban (ELIQUIS) 5 MG Oral Tab Take 1 tablet (5 mg total) by mouth 2 (two) times daily. 180 tablet 3    metoprolol succinate  MG Oral Tablet 24 Hr TAKE 1 TABLET(100 MG) BY MOUTH DAILY 90 tablet 3    NIFEdipine ER 60 MG Oral Tablet 24 Hr TAKE 1 TABLET(60 MG) BY MOUTH DAILY 90 tablet 3    ATORVASTATIN 20 MG Oral Tab TAKE 1 TABLET(20 MG) BY MOUTH EVERY NIGHT 90 tablet 3    FAMOTIDINE 40 MG Oral Tab TAKE 1 TABLET(40 MG) BY MOUTH TWICE DAILY 180 tablet 3    POTASSIUM CHLORIDE 10 MEQ Oral Tab CR TAKE 2 TABLETS(20 MEQ) BY MOUTH EVERY  tablet 3    nystatin 100,000 Units/g External Ointment APPLY SMALL AMOUNT TO THE AFFECTED AREA 2 TO 3 TIMES DAILY. AS NEEDED 30 g 1    aspirin 81 MG Oral Chew Tab Chew 1 tablet (81 mg total) by mouth daily. 30 tablet 0    Multiple Vitamins-Minerals (CENTRUM ULTRA WOMENS) Oral Tab Take 1 tablet by mouth daily.      Calcium Carbonate-Vitamin D (CALCIUM 600 + D OR) Take 1 tablet by mouth 2 (two)  times daily.          Past Medical History:    Dougherty's esophagus    Chronic neck pain    DVT (deep venous thrombosis) (HCC)    Hematuria    w/u    High blood pressure    High cholesterol    High level of uric acid in blood    10.4    Pulmonary emboli (HCC)    Pulmonary embolism (HCC)    Renal disorder    Renal insufficiency    S/P colonoscopy    Stroke (HCC)    TIA    Thalamic stroke (HCC)      Past Surgical History:   Procedure Laterality Date    Colonoscopy  1/15, 11/10, 11/06    Colonoscopy  09/2019    Colonoscopy N/A 09/27/2019    Procedure: COLONOSCOPY;  Surgeon: Khari Elizondo MD;  Location: [facility] ENDOSCOPY    Colonoscopy  02/2024    Egd  5/14, 4/12, 5/11, 9/08    Hysterectomy  7-    Inj tendon/ligament/cyst      Injection Tendon Sheath, Ligament    Vaginal hysterectomy        Family History   Problem Relation Age of Onset    Cancer Mother 54        lymphoma    Cancer Father         lung cancer    Hypertension Father     Glaucoma Father     Heart Disease Father         CAD    Diabetes Father     Other (atrial fibrillation) Brother     Cancer Maternal Grandfather         gastric cancer    Other (Other) Paternal Grandmother         bunions    COPD Daughter     No Known Problems Son     Breast Cancer Paternal Aunt         60's    Ovarian Cancer Neg     Pancreatic Cancer Neg     Prostate Cancer Neg       Social History:   Social History     Socioeconomic History    Marital status:    Tobacco Use    Smoking status: Never     Passive exposure: Never    Smokeless tobacco: Never   Vaping Use    Vaping status: Never Used   Substance and Sexual Activity    Alcohol use: No    Drug use: No   Other Topics Concern    Caffeine Concern No        REVIEW OF SYSTEMS:   GENERAL: feels well otherwise    EXAM:   /80   Pulse 78   Ht 5' 1\" (1.549 m)   Wt 172 lb 8 oz (78.2 kg)   SpO2 98%   BMI 32.59 kg/m²     GENERAL: well developed, well nourished, in no apparent distress  NEURO: A&O x 3, moves all 4  extremities spontaneously    ASSESSMENT AND PLAN:   Naomi Razo is a 84 year old female who presents for a check up.    Encounter to establish care  - labs 1/13/25 reviewed    HTN  - controlled on current regimen:  - metoprolol 100 mg daily  - nifedipine 60 mg daily    Hx of PE  DVT LLE 10/25/24  - on eliquis  - has hematology f/u    HLD  - atorvastatin 20 mg at bedtime    - LDL 64 1/13/25    Lung nodule  - repeat CT chest 7/2025    Dougherty's esophagus  - GI: Dr. Elizonod  - takes pepcid PRN    Hx of CVA  - seen on MRI 7/23/20  - on eliquis, atorvastatin    CKD3a  - follows w/nephrology Judy Hernadez    Hx of BCC  - sees derm Dr. Palacio    Osteopenia  - DEXA 7/8/24  - calcium/vitamin D/weight bearing activities recommended    Healthcare Maintenance  Cancer Screenings:  - Breast: birads 3 1/10/25, repeat mammo and L breast ultrasound ordered  - Cervical: age >65  - Colon: colonoscopy 2/1/24 w/Dr. Elizondo - no further surveillance colonoscopies recommended  - Lung: age >80    Vaccines:  - Tdap: recommend if >10 years since prior  - Shingles: shingrix x2  - Pneumonia: pneumovax 23 11/6/18, prevnar 13 2/3/15, prevnar 20 recommended  - Flu: recommend yearly, UTD  - COVID-19: UTD  - RSV: 9/19/23    Misc:  - DEXA: osteopenia 7/8/24    RTC in 6 months or sooner PRN.    For E/M code - 60 minutes spent reviewing performing chart review, obtaining a history, performing a physical exam, reviewing the assessment/plan, placing orders, and completing documentation.     Susan Monique DO  1/15/2025  12:26 PM

## 2025-01-23 ENCOUNTER — TELEPHONE (OUTPATIENT)
Dept: INTERNAL MEDICINE CLINIC | Facility: CLINIC | Age: 85
End: 2025-01-23

## 2025-01-23 NOTE — TELEPHONE ENCOUNTER
Patient is calling her dermatologist prescribed Triamcinolone Ointment for Eczema.    Patient is asking if it is okay to use with her medications?    Patient has a friend used a similar ointment that interacted with Metoprolol.  Patient just wants to be safe.    Please call [phone]

## 2025-02-03 ENCOUNTER — TELEPHONE (OUTPATIENT)
Dept: INTERNAL MEDICINE CLINIC | Facility: CLINIC | Age: 85
End: 2025-02-03

## 2025-02-03 RX ORDER — NYSTATIN 100000 U/G
OINTMENT TOPICAL
Qty: 30 G | Refills: 1 | Status: SHIPPED | OUTPATIENT
Start: 2025-02-03

## 2025-02-03 NOTE — TELEPHONE ENCOUNTER
To Dr. Monique for initial RX(s)     Previously prescribed by patient's former PCP, Dr. Deleon.     Established with you on 1/15/2025.     Thank you!

## 2025-02-06 ENCOUNTER — OFFICE VISIT (OUTPATIENT)
Age: 85
End: 2025-02-06
Attending: INTERNAL MEDICINE
Payer: MEDICARE

## 2025-02-06 VITALS
SYSTOLIC BLOOD PRESSURE: 144 MMHG | WEIGHT: 171.19 LBS | RESPIRATION RATE: 18 BRPM | HEART RATE: 62 BPM | BODY MASS INDEX: 32.32 KG/M2 | TEMPERATURE: 98 F | DIASTOLIC BLOOD PRESSURE: 74 MMHG | HEIGHT: 61 IN | OXYGEN SATURATION: 93 %

## 2025-02-06 DIAGNOSIS — Z79.01 ANTICOAGULATION MANAGEMENT ENCOUNTER: ICD-10-CM

## 2025-02-06 DIAGNOSIS — Z86.718 HISTORY OF DVT (DEEP VEIN THROMBOSIS): ICD-10-CM

## 2025-02-06 DIAGNOSIS — Z86.711 HISTORY OF PULMONARY EMBOLUS (PE): Primary | ICD-10-CM

## 2025-02-06 DIAGNOSIS — Z51.81 ANTICOAGULATION MANAGEMENT ENCOUNTER: ICD-10-CM

## 2025-02-06 NOTE — PROGRESS NOTES
[facility] Hematology/Oncology Progress Note      Patient Name: Naomi Razo   YOB: 1940   Medical Record Number: [id number]   CSN: [id number]   Consulting Physician: Lorenzo Shaw MD    Diagnosis  1. History of pulmonary embolus (PE)    2. Anticoagulation management encounter    3. History of DVT (deep vein thrombosis)      Current Therapy  Eliquis 5mg BID - started 10/24    INTERVAL HISTORY    Patient returns for follow-up. Since the last visit She has done well.  She is tolerating Eliquis without any bleeding issues.  No new leg complaints.  She is walking about 7000 steps every day.  Pt also denies any bleeding, specifically hematuria, hematemesis or hemoptysis.    Past Hematologic History   Naomi Razo is a 84 year old female seen today in the Hematology Clinic  for LLE DVT.     She had presented to her PCP with pain in the left leg.  10/25/2024 venous Doppler showed an acute expansile DVT with an 1 left peroneal vein and 1 of 2 left posterior tibial veins but no extension the popliteal vein.  No other DVT was noted. 10/25/2024 VQ scan showed no evidence of PE.    The patient has a prior history of a pulmonary embolism in 2015 after she had developed some food poisoning.  She was on short-term anticoagulation with warfarin.  She denies any family history of venous thromboembolism.  No recent immobilization illness or surgery.  She usually walks 5000 steps daily with her dog.  Used to be about 12,000 steps but had had an injury earlier this year but several months prior to her DVT    She was started on apixaban and has been tolerating this well.  She is referred here for discussion regarding duration of anticoagulation.  No bleeding issues are described. No recurrence of leg pain. She complains of easy bruising but denies epistaxis, gum bleeding, hematuria, melana or BRBPR.  She denies family history of bleeding and clotting diathesis and recurrent miscarriages.        Past Medical  History:  Past Medical History:    Dougherty's esophagus    Chronic neck pain    DVT (deep venous thrombosis) (HCC)    Hematuria    w/u    High blood pressure    High cholesterol    High level of uric acid in blood    10.4    Pulmonary emboli (HCC)    Pulmonary embolism (HCC)    Renal disorder    Renal insufficiency    S/P colonoscopy    Stroke (HCC)    TIA    Thalamic stroke (HCC)       Past Surgical History:  Past Surgical History:   Procedure Laterality Date    Colonoscopy  1/15, 11/10,     Colonoscopy  2019    Colonoscopy N/A 2019    Procedure: COLONOSCOPY;  Surgeon: Khari Elizondo MD;  Location: [facility] ENDOSCOPY    Colonoscopy  2024    Egd  , , ,     Hysterectomy  2023    Inj tendon/ligament/cyst      Injection Tendon Sheath, Ligament    Vaginal hysterectomy         Family Medical History:  Family History   Problem Relation Age of Onset    Cancer Mother 54        lymphoma    Cancer Father         lung cancer    Hypertension Father     Glaucoma Father     Heart Disease Father         CAD    Diabetes Father     Other (atrial fibrillation) Brother     Cancer Maternal Grandfather         gastric cancer    Other (Other) Paternal Grandmother         bunions    COPD Daughter     No Known Problems Son     Breast Cancer Paternal Aunt         60's    Ovarian Cancer Neg     Pancreatic Cancer Neg     Prostate Cancer Neg        Gyne History:  OB History    Para Term  AB Living   2 2 0 0 0 0   SAB IAB Ectopic Multiple Live Births   0 0 0 0 0       Social History:  Social History     Socioeconomic History    Marital status:      Spouse name: Not on file    Number of children: Not on file    Years of education: Not on file    Highest education level: Not on file   Occupational History    Not on file   Tobacco Use    Smoking status: Never     Passive exposure: Never    Smokeless tobacco: Never   Vaping Use    Vaping status: Never Used   Substance and Sexual Activity     Alcohol use: No    Drug use: No    Sexual activity: Not on file   Other Topics Concern     Service Not Asked    Blood Transfusions Not Asked    Caffeine Concern No    Occupational Exposure Not Asked    Hobby Hazards Not Asked    Sleep Concern Not Asked    Stress Concern Not Asked    Weight Concern Not Asked    Special Diet Not Asked    Back Care Not Asked    Exercise Not Asked    Bike Helmet Not Asked    Seat Belt Not Asked    Self-Exams Not Asked   Social History Narrative    Not on file     Social Drivers of Health     Food Insecurity: Not on file   Transportation Needs: Not on file   Housing Stability: Not on file       Allergies:   Allergies[1]    Current Medications:   nystatin 100,000 Units/g External Ointment APPLY SMALL AMOUNT TO THE AFFECTED AREA 2 TO 3 TIMES DAILY. AS NEEDED 30 g 1    apixaban (ELIQUIS) 5 MG Oral Tab Take 1 tablet (5 mg total) by mouth 2 (two) times daily. 180 tablet 3    metoprolol succinate  MG Oral Tablet 24 Hr TAKE 1 TABLET(100 MG) BY MOUTH DAILY 90 tablet 3    NIFEdipine ER 60 MG Oral Tablet 24 Hr TAKE 1 TABLET(60 MG) BY MOUTH DAILY 90 tablet 3    ATORVASTATIN 20 MG Oral Tab TAKE 1 TABLET(20 MG) BY MOUTH EVERY NIGHT 90 tablet 3    FAMOTIDINE 40 MG Oral Tab TAKE 1 TABLET(40 MG) BY MOUTH TWICE DAILY 180 tablet 3    POTASSIUM CHLORIDE 10 MEQ Oral Tab CR TAKE 2 TABLETS(20 MEQ) BY MOUTH EVERY  tablet 3    aspirin 81 MG Oral Chew Tab Chew 1 tablet (81 mg total) by mouth daily. 30 tablet 0    Multiple Vitamins-Minerals (CENTRUM ULTRA WOMENS) Oral Tab Take 1 tablet by mouth daily.      Calcium Carbonate-Vitamin D (CALCIUM 600 + D OR) Take 1 tablet by mouth 2 (two) times daily.           Review of Systems:    Pertinent positives and negatives noted in the the HPI.     Physical Examination:  Ht 1.549 m (5' 1\")   Wt 77.7 kg (171 lb 3.2 oz)   BMI 32.35 kg/m²     General: Patient is alert and oriented x 3, not in acute distress.  HEENT: EOMs intact. PERRL. Oropharynx is  clear.   Neck: No JVD. No palpable lymphadenopathy. Neck is supple.  Lymphatics: There is no palpable lymphadenopathy throughout in the cervical, supraclavicular, axillary, or inguinal regions.  Chest: Clear to auscultation. No wheezes or rales.  Heart: Regular rate and rhythm. S1S2 normal.  Extremities: No edema or calf tenderness. Wearing compression stockings  Neurological: Grossly intact.       Labs:    Lab Results   Component Value Date/Time    WBC 7.9 01/13/2025 09:33 AM    RBC 3.98 01/13/2025 09:33 AM    HGB 13.1 01/13/2025 09:33 AM    HCT 37.0 01/13/2025 09:33 AM    MCV 93.0 01/13/2025 09:33 AM    MCH 32.9 01/13/2025 09:33 AM    MCHC 35.4 01/13/2025 09:33 AM    RDW 12.7 01/13/2025 09:33 AM    NEPRELIM 3.78 01/13/2025 09:33 AM    .0 01/13/2025 09:33 AM         Impression:    ICD-10-CM    1. History of pulmonary embolus (PE)  Z86.711       2. Anticoagulation management encounter  Z51.81     Z79.01       3. History of DVT (deep vein thrombosis)  Z86.718         84-year-old female with recent unprovoked left lower extremity DVT.  She also has a prior history of PE.  Given the unprovoked nature of her DVT risk of recurrence of anticoagulation is elevated.  I have therefore recommended she consider long-term anticoagulation therapy.      Plan:  She is completing nearly 6 months of apixaban soon.  Can then switch to apixaban 2.5 mg twice daily and stay on this indefinitely.  Thrombophilia workup is not indicated in her situation given her age and would not change her management  She has no contraindications to flying as long as she is on continued anticoagulation.  RTC in 1 year    Thank you Dr Susan Monique DO for the opportunity to participate in the care of this interesting patient. Please do contact me if I may be of any further assistance    Lorenzo Shaw MD  [facility]            [1]   Allergies  Allergen Reactions    Ibuprofen OTHER (SEE COMMENTS)     Kidney problems     Nsaids OTHER (SEE COMMENTS)     Kidney problems      Radiology Contrast Iodinated Dyes OTHER (SEE COMMENTS)     Kidney problems      Naproxen OTHER (SEE COMMENTS)    Azithromycin DIARRHEA

## 2025-02-07 ENCOUNTER — TELEPHONE (OUTPATIENT)
Dept: HEMATOLOGY/ONCOLOGY | Facility: HOSPITAL | Age: 85
End: 2025-02-07

## 2025-02-07 NOTE — TELEPHONE ENCOUNTER
I called and spoke to the patient.  She will continue her 5mg dosing until she is out of the medication in the next 1-2 weeks, and then will  and start her 2.5mg twice daily Eliquis dosing.

## 2025-02-07 NOTE — TELEPHONE ENCOUNTER
Naomi [phone]  Would like to know if she can finish the 5 mg tablets of ELIQUIS.   Would also like to know if she needs to cut them in half  to make it two tablets or just   one for the day until she finishes them? Before she starts taking the 2.5 mg tablets of ELIQUIS.  Thanks Karlie

## 2025-02-09 ENCOUNTER — TELEPHONE (OUTPATIENT)
Dept: INTERNAL MEDICINE CLINIC | Facility: CLINIC | Age: 85
End: 2025-02-09

## 2025-02-09 DIAGNOSIS — U07.1 COVID: Primary | ICD-10-CM

## 2025-02-09 LAB — AMB EXT COVID-19 RESULT: DETECTED

## 2025-02-11 ENCOUNTER — TELEPHONE (OUTPATIENT)
Dept: INTERNAL MEDICINE CLINIC | Facility: CLINIC | Age: 85
End: 2025-02-11

## 2025-02-11 NOTE — TELEPHONE ENCOUNTER
Spoke with patient-she will switch to 2.5mg twice daily for the 5 days, and then finish the 5mg twice daily, and then go back to 2.5mg bid longterm.

## 2025-02-11 NOTE — TELEPHONE ENCOUNTER
Patient is and that she was diagnosed with Covid on Sunday the 9th.  Patient called the office and her call was directed to Dr Henry who was on call.  Dr Henry prescribed PAXLOVID and told the patient to only take 1/2 of her dosing of her blood thinner.    Patient is calling because she wants to verify what dosage did Dr Henry think the patient was taking of the  ELIQUIS.    Patient was taking 5MG   NOT  2.5MG  that was prescribe to her on 2/6/25.  Patient is not switch over to the 2.5MG because she still had some of the 5MG left.    Patient would like this question sent to Dr Henry since she was the doctor gave the the instruction.    Please call and advise

## 2025-02-21 ENCOUNTER — HOSPITAL ENCOUNTER (OUTPATIENT)
Dept: ULTRASOUND IMAGING | Facility: HOSPITAL | Age: 85
Discharge: HOME | End: 2025-02-21
Attending: INTERNAL MEDICINE
Payer: MEDICARE

## 2025-02-21 ENCOUNTER — OFFICE VISIT (OUTPATIENT)
Dept: INTERNAL MEDICINE CLINIC | Facility: CLINIC | Age: 85
End: 2025-02-21

## 2025-02-21 VITALS
SYSTOLIC BLOOD PRESSURE: 120 MMHG | HEART RATE: 68 BPM | OXYGEN SATURATION: 97 % | DIASTOLIC BLOOD PRESSURE: 76 MMHG | HEIGHT: 61 IN | WEIGHT: 171 LBS | TEMPERATURE: 98 F | BODY MASS INDEX: 32.28 KG/M2

## 2025-02-21 DIAGNOSIS — R60.0 LEG EDEMA, LEFT: ICD-10-CM

## 2025-02-21 DIAGNOSIS — R60.0 LEG EDEMA, LEFT: Primary | ICD-10-CM

## 2025-02-21 PROCEDURE — 99214 OFFICE O/P EST MOD 30 MIN: CPT | Performed by: INTERNAL MEDICINE

## 2025-02-21 PROCEDURE — G2211 COMPLEX E/M VISIT ADD ON: HCPCS | Performed by: INTERNAL MEDICINE

## 2025-02-21 PROCEDURE — 93971 EXTREMITY STUDY: CPT | Performed by: INTERNAL MEDICINE

## 2025-02-21 NOTE — PROGRESS NOTES
Naomi Razo is a 84 year old female.  Chief Complaint   Patient presents with    Knee Pain     Pt here due to left knee pain x 1 week, states it hurts behind the knee and not her usual knee cap pain.        HPI:   Naomi Razo is a 84 year old female who presents for:    Left knee pain x 1 week  Hurts behind knee  Recently had covid and cut down eliquis dose to 2.5mg twice daily.   Is currently taking eliquis 5mg bid (is supposed to take 2.5mg bid once she finishes the 5mg dose)    Has increased swelling    Wt Readings from Last 6 Encounters:   02/21/25 171 lb (77.6 kg)   02/06/25 171 lb 3.2 oz (77.7 kg)   01/15/25 172 lb 8 oz (78.2 kg)   11/07/24 170 lb (77.1 kg)   11/01/24 171 lb (77.6 kg)   10/25/24 172 lb 6.4 oz (78.2 kg)     Body mass index is 32.31 kg/m².       Current Outpatient Medications   Medication Sig Dispense Refill    apixaban (ELIQUIS) 2.5 MG Oral Tab Take 1 tablet (2.5 mg total) by mouth 2 (two) times daily. 180 tablet 3    nystatin 100,000 Units/g External Ointment APPLY SMALL AMOUNT TO THE AFFECTED AREA 2 TO 3 TIMES DAILY. AS NEEDED 30 g 1    metoprolol succinate  MG Oral Tablet 24 Hr TAKE 1 TABLET(100 MG) BY MOUTH DAILY 90 tablet 3    NIFEdipine ER 60 MG Oral Tablet 24 Hr TAKE 1 TABLET(60 MG) BY MOUTH DAILY 90 tablet 3    ATORVASTATIN 20 MG Oral Tab TAKE 1 TABLET(20 MG) BY MOUTH EVERY NIGHT 90 tablet 3    FAMOTIDINE 40 MG Oral Tab TAKE 1 TABLET(40 MG) BY MOUTH TWICE DAILY 180 tablet 3    POTASSIUM CHLORIDE 10 MEQ Oral Tab CR TAKE 2 TABLETS(20 MEQ) BY MOUTH EVERY  tablet 3    aspirin 81 MG Oral Chew Tab Chew 1 tablet (81 mg total) by mouth daily. 30 tablet 0    Multiple Vitamins-Minerals (CENTRUM ULTRA WOMENS) Oral Tab Take 1 tablet by mouth daily.      Calcium Carbonate-Vitamin D (CALCIUM 600 + D OR) Take 1 tablet by mouth 2 (two) times daily.          Past Medical History:    Dougherty's esophagus    Chronic neck pain    DVT (deep venous thrombosis) (HCC)    Hematuria     w/u    High blood pressure    High cholesterol    High level of uric acid in blood    10.4    Pulmonary emboli (HCC)    Pulmonary embolism (HCC)    Renal disorder    Renal insufficiency    S/P colonoscopy    Stroke (HCC)    TIA    Thalamic stroke (HCC)      Past Surgical History:   Procedure Laterality Date    Colonoscopy  1/15, 11/10, 11/06    Colonoscopy  09/2019    Colonoscopy N/A 09/27/2019    Procedure: COLONOSCOPY;  Surgeon: Khari Elizondo MD;  [facility]: [facility] ENDOSCOPY    Colonoscopy  02/2024    Egd  5/14, 4/12, 5/11, 9/08    Hysterectomy  7-    Inj tendon/ligament/cyst      Injection Tendon Sheath, Ligament    Vaginal hysterectomy        Family History   Problem Relation Age of Onset    Cancer Mother 54        lymphoma    Cancer Father         lung cancer    Hypertension Father     Glaucoma Father     Heart Disease Father         CAD    Diabetes Father     Other (atrial fibrillation) Brother     Cancer Maternal Grandfather         gastric cancer    Other (Other) Paternal Grandmother         bunions    COPD Daughter     No Known Problems Son     Breast Cancer Paternal Aunt         60's    Ovarian Cancer Neg     Pancreatic Cancer Neg     Prostate Cancer Neg       Social History:   Social History     Socioeconomic History    Marital status:    Tobacco Use    Smoking status: Never     Passive exposure: Never    Smokeless tobacco: Never   Vaping Use    Vaping status: Never Used   Substance and Sexual Activity    Alcohol use: No    Drug use: No   Other Topics Concern    Caffeine Concern No          REVIEW OF SYSTEMS:   GENERAL: feels well otherwise  LLE swelling and pain      EXAM:   /76   Pulse 68   Temp 97.9 °F (36.6 °C)   Ht 5' 1\" (1.549 m)   Wt 171 lb (77.6 kg)   SpO2 97%   BMI 32.31 kg/m²     GENERAL: well developed, well nourished, in no apparent distress  LLE: +2 pitting edema when compared to R; no cyst in posterior knee; tenderness along greater saphenous vein      ASSESSMENT  AND PLAN:     LLE edema  Advised stat venous doppler  Further treatment plan based on results    Nuria Henry DO  2/21/2025  1:22 PM

## 2025-04-07 NOTE — OPERATIVE REPORT
[address] [facility] Location: [facility] [id number] MRN [id number]   Admission Date 7/28/2023 Operation Date 7/28/2023   Attending Physician Mark Tyler MD Operating Physician Amos Opitz, MD     Pre-Operative Diagnosis: Incomplete uterovaginal prolapse, stress incontinence, microscopic hematuria, chronic cystitis    Post-Operative Diagnosis:  Incomplete uterovaginal prolapse, stress incontinence, microscopic hematuria, chronic cystitis    Procedure Performed: Total Vaginal Hysterectomy  Colpectomy  Solyx Single-Incision Midurethral Sling  Enterocele Repair  Anterior Colporrhaphy  Posterior Colporrhaphy  Perineorrhaphy  Cystoscopy with calibration    Surgeon:  Qing Singh MD    Assistants:  Eboni Pimentel CSA (first assist). Assistant tasks:  First assist retraction, tissue alteration. Anesthesia: General    IVF:  1000cc    UO:  300cc    EBL: 100cc    Blood Administered:  None    Specimens:  Cervix, Uterus    Drains:  Garsia catheter to gravity    Complications:  None    Condition:  Stable    Implants:  Solyx single incision sling    Findings:  Stage II Uterovaginal Prolapse. Normal Cervix. Enlarged Fibroid Uterus, Vanetta Peel; Tubes/Ovaries. Normal cystourethroscopy, no intraluminal mucosal lesions/tumors/injuries/foreign material.  Equal and brisk efflux of urine visualized from bilateral ureteral orifices. Summary of Case:  After the patient was identified and the planned procedure was reviewed and confirmed, she was brought to the operating room where anesthesia was obtained without difficulty. She was placed in the dorsal lithotomy position using the Yellow-Fin stirrups. Compression boots were in place and working during the entire procedure. Cefazolin was administered preoperatively for surgical prophylaxis. She was prepped and draped in standard sterile fashion.   A time out procedure was performed confirming agreement of the planned procedure among Order for DE entered for 2025.    all participating personnel. A Garsia catheter was placed to drain the bladder completely. A Lone Star retractor was used for labial retraction. We proceeded with the Total Vaginal Hysterectomy. The cervix was grasped with tenaculums and placed on traction. The cervix was injection with marcaine with epinephrine and circumferentially incised. The vaginal tissue was pushed off the cervical tissue and the posterior cul-de-sac was entered sharply without difficulty. The uterosacral ligaments were clamped and transected using the LigaSure device. The anterior cul-de-sac was entered sharply without difficulty. In a sequential fashion, the cardinal ligament, uterine arteries, broad ligaments, were clamped and transected using the LigaSure device. The uteroovarian ligaments were clamped, transected, and sutured ligated bilaterally. The specimen was sent off the operative field. Inspection of the pedicals revealed excellent hemostasis. We proceded with the colpectomy, enterocele and anterior repair. The vaginal cuff angles were grasped with Allis clamps and placed on traction. The everted vagina was marked circumferentially and then in quadrants. Sufficient amount of epithelium was left uninterrupted posteriorly in order to allow for the posterior colporrhaphy and extended perineorrhaphy. The epithelium was injected with Marcaine with epinephrine and incised. The quadrants of epithelium were incised, undermined sharply and excised. Minor areas of bleeding were cauterized for hemostasis and the excision bed was copiously irrigated. The enterocele was reduced by placing two sequential purse string sutures of 2-0 Vicryl. The anterior colporrhaphy was completed by plicating the anterior endopelvic connective tissue in the midline using a running stitch of 2-0 PDS imbricating and obliterating the cystocele.  The vaginal incision was then closed in an anterior-to-posterior orientation using a running locking stitch of 0-Vicryl. We proceeded with the Solyx single-incision Midurethral Sling. The mid urethra was identified and the overlying epithelium was injected with Marcaine solution. Bilateral anterior sulcus were injected with the solution to aid in hydrodistention and dissection of the path of the midurethral sling trochar. A 2cm vertical incision was created in the epithelium overlying the midurethra. Periurethral tunnels were created sharply to the level of the inferior pubic rami bilaterally. With the bladder completely emptied, the trochars were passed through the vaginal incision, medial and posterior to the inferior pubic ramus until the sling anchor perforated the obturator muscle and obturator membrane. This was performed bilaterally. The vagina was copiously irrigated and suctioned, and the vaginal incision was closed using a running locking stitch of 2-0 Vicryl. The Garsia was removed. The urethra was calibrated to accommodate the 17 Khmer cystoscope obturator and sheath. A survey of the bladder and urethral mucosa revealed no identifiable lesions tumors injuries or foreign material.  There was equal and brisk efflux of urine/fluoresceine dye from the ureteral orifices bilaterally. The bladder was completely emptied and the Garsia reinserted. We proceded with the posterior colporrhaphy and perineorrhapy. Two Allis clamps were placed on the hymenal remnant at the 4 and 8 o'clock positions. After injection of Marcaine with epinephrine, a V-shaped incision was made in the perineal skin, undermined and excises. An inverted V-shaped incision was created in the posterior vaginal wall extending to 1 cm below the distal vaginal closure incision. The vaginal epithelium was sharply dissected away from the underlying connective tissue with a finger in the rectum to confirm no lumenal injury.   The posterior rectovaginal septum was plicated in the midline using a arunning stitch of 2-0 PDS suture then reattached to the cephalad portion of the perineal body, obliterating the rectocele. The distal capsules of the levator muscles were reapproximated in the midline using 0-Vicryl, thereby reducing the genital hiatus. The perineorrhaphy was completed by placeing a series of 0 Vicryl sutures reappromating the capsules of the bulbocavernosus and superficial transverse perineal muscles back to the midline. The posterior vaginal and perineal skin incisions were closed using a running subcutaneous stitch of  2-0 Vicryl suture. The Garsia was placed to a bag to gravity. The vagina was copious irrigated and suctioned. A vaginal sweep was negative for retained sponges. A rectal exam was performed confirming no rectal injury or suture in the lumen. Instrument counts were correct. The patient tolerated the procedure well. She was awakened and extubated in the operating room and transferred to the PACU in stable condition.     Howie Archer MD  7/28/2023  2:19 PM

## 2025-04-24 ENCOUNTER — TELEPHONE (OUTPATIENT)
Dept: INTERNAL MEDICINE CLINIC | Facility: CLINIC | Age: 85
End: 2025-04-24

## 2025-04-24 DIAGNOSIS — G89.29 CHRONIC PAIN OF LEFT KNEE: Primary | ICD-10-CM

## 2025-04-24 DIAGNOSIS — M25.562 CHRONIC PAIN OF LEFT KNEE: Primary | ICD-10-CM

## 2025-04-24 NOTE — TELEPHONE ENCOUNTER
Patient called to ask if Dr. Monique would do a cortisone injection for her left knee in office as Dr. Deleon used to do that for her.     If Dr. Monique does not do cortisone injections in office, can she refer her to previous Ortho she saw or recommend one.  Patient could not recall what Ortho she saw but stated it should be on file.     Patient's #[phone]

## 2025-04-25 NOTE — TELEPHONE ENCOUNTER
Spoke to patient, clarified that previous orthopod Dr Doyle specialize in shoulders and not knees.      Patient referred to Dr Moraes, and patient provided with office and telephone number, will call Abdominal Pain scheduled appt.

## 2025-04-25 NOTE — TELEPHONE ENCOUNTER
Please inform her I do not give injections. What does she want injected? Previous ortho seen was Dr. Abel in 2023 but he is a shoulder specialist, order pended. Thanks!

## 2025-05-05 ENCOUNTER — TELEPHONE (OUTPATIENT)
Dept: INTERNAL MEDICINE CLINIC | Facility: CLINIC | Age: 85
End: 2025-05-05

## 2025-05-06 RX ORDER — POTASSIUM CHLORIDE 750 MG/1
20 TABLET, EXTENDED RELEASE ORAL DAILY
Qty: 180 TABLET | Refills: 3 | Status: SHIPPED | OUTPATIENT
Start: 2025-05-06

## 2025-05-07 ENCOUNTER — OFFICE VISIT (OUTPATIENT)
Dept: INTERNAL MEDICINE CLINIC | Facility: CLINIC | Age: 85
End: 2025-05-07

## 2025-05-07 VITALS
BODY MASS INDEX: 33.16 KG/M2 | TEMPERATURE: 98 F | DIASTOLIC BLOOD PRESSURE: 68 MMHG | OXYGEN SATURATION: 95 % | SYSTOLIC BLOOD PRESSURE: 116 MMHG | WEIGHT: 175.63 LBS | HEIGHT: 61 IN | HEART RATE: 63 BPM

## 2025-05-07 DIAGNOSIS — M25.562 LEFT KNEE PAIN, UNSPECIFIED CHRONICITY: Primary | ICD-10-CM

## 2025-05-07 PROCEDURE — 99214 OFFICE O/P EST MOD 30 MIN: CPT | Performed by: INTERNAL MEDICINE

## 2025-05-07 NOTE — PROGRESS NOTES
Naomi Razo is a 84 year old female.  Chief Complaint   Patient presents with    Knee Pain     Left knee pain and swelling for past 2 weeks. Been getting Cortisone shots for the pain. Appt with Ortho 5/30/25. Pain 2/10 to left knee     HPI:   Naomi Razo is a 84 year old female who presents for L knee pain.    LOV 1/15/25.    Since, seen by hematologist. Recommended eliquis 2.5 mg bid. Patient is complaint with eliquis. Pt has been having intermittent LE swelling and L knee pain. Seen by Dr. Henry for LLE swelling. LE dopplers negative for DVT. Pt wears knee high compression stockings. She has an appt w/ortho at the end of the month.     Wt Readings from Last 6 Encounters:   05/07/25 175 lb 9.6 oz (79.7 kg)   02/21/25 171 lb (77.6 kg)   02/06/25 171 lb 3.2 oz (77.7 kg)   01/15/25 172 lb 8 oz (78.2 kg)   11/07/24 170 lb (77.1 kg)   11/01/24 171 lb (77.6 kg)     Body mass index is 33.18 kg/m².       Current Medications[1]   Past Medical History[2]   Past Surgical History[3]   Family History[4]   Social History:   Short Social Hx on File[5]       REVIEW OF SYSTEMS:   GENERAL: feels well otherwise  MSK: + L knee pain  EXT: + intermittent LE edema    EXAM:   /68   Pulse 63   Temp 97.6 °F (36.4 °C)   Ht 5' 1\" (1.549 m)   Wt 175 lb 9.6 oz (79.7 kg)   SpO2 95%   BMI 33.18 kg/m²     GENERAL: well developed, well nourished, in no apparent distress  MSK: no significant L knee effusion, no ttp over patella, medial/lateral joint line  EXTREMITIES: no c/c/e, +2 DP pulses bilaterally, compression stockings on b/l, no ttp of calves b/l  NEURO: A&O x 3, moves all 4 extremities spontaneously    ASSESSMENT AND PLAN:   Naomi Razo is a 84 year old female who presents for L knee pain.    L knee pain  - likely 2/2 OA, has appt w/ortho    Intermittent LE edema  - likely 2/2 venous insufficiency, continue compression stockings, elevation of legs at night, low sodium diet  - VTE ruled out on doppler 2/2025,  pt is compliant w/AC    For E/M code - 30 minutes spent reviewing performing chart review, obtaining a history, performing a physical exam, reviewing the assessment/plan, placing orders, and completing documentation.     Susan Monique DO  5/7/2025  9:24 AM         [1]   Current Outpatient Medications   Medication Sig Dispense Refill    potassium chloride 10 MEQ Oral Tab CR Take 2 tablets (20 mEq total) by mouth daily. 180 tablet 3    apixaban (ELIQUIS) 2.5 MG Oral Tab Take 1 tablet (2.5 mg total) by mouth 2 (two) times daily. 180 tablet 3    nystatin 100,000 Units/g External Ointment APPLY SMALL AMOUNT TO THE AFFECTED AREA 2 TO 3 TIMES DAILY. AS NEEDED 30 g 1    metoprolol succinate  MG Oral Tablet 24 Hr TAKE 1 TABLET(100 MG) BY MOUTH DAILY 90 tablet 3    NIFEdipine ER 60 MG Oral Tablet 24 Hr TAKE 1 TABLET(60 MG) BY MOUTH DAILY 90 tablet 3    ATORVASTATIN 20 MG Oral Tab TAKE 1 TABLET(20 MG) BY MOUTH EVERY NIGHT 90 tablet 3    FAMOTIDINE 40 MG Oral Tab TAKE 1 TABLET(40 MG) BY MOUTH TWICE DAILY 180 tablet 3    aspirin 81 MG Oral Chew Tab Chew 1 tablet (81 mg total) by mouth daily. 30 tablet 0    Multiple Vitamins-Minerals (CENTRUM ULTRA WOMENS) Oral Tab Take 1 tablet by mouth daily.      Calcium Carbonate-Vitamin D (CALCIUM 600 + D OR) Take 1 tablet by mouth 2 (two) times daily.       [2]   Past Medical History:   Dougherty's esophagus    Chronic neck pain    DVT (deep venous thrombosis) (HCC)    Hematuria    w/u    High blood pressure    High cholesterol    High level of uric acid in blood    10.4    Pulmonary emboli (HCC)    Pulmonary embolism (HCC)    Renal disorder    Renal insufficiency    S/P colonoscopy    Stroke (HCC)    TIA    Thalamic stroke (HCC)   [3]   Past Surgical History:  Procedure Laterality Date    Colonoscopy  1/15, 11/10, 11/06    Colonoscopy  09/2019    Colonoscopy N/A 09/27/2019    Procedure: COLONOSCOPY;  Surgeon: Khari Elizondo MD;  Location: [facility] ENDOSCOPY    Colonoscopy  02/2024     Egd  5/14, 4/12, 5/11, 9/08    Hysterectomy  7-    Inj tendon/ligament/cyst      Injection Tendon Sheath, Ligament    Vaginal hysterectomy     [4]   Family History  Problem Relation Age of Onset    Cancer Mother 54        lymphoma    Cancer Father         lung cancer    Hypertension Father     Glaucoma Father     Heart Disease Father         CAD    Diabetes Father     Other (atrial fibrillation) Brother     Cancer Maternal Grandfather         gastric cancer    Other (Other) Paternal Grandmother         bunions    COPD Daughter     No Known Problems Son     Breast Cancer Paternal Aunt         60's    Ovarian Cancer Neg     Pancreatic Cancer Neg     Prostate Cancer Neg    [5]   Social History  Socioeconomic History    Marital status:    Tobacco Use    Smoking status: Never     Passive exposure: Never    Smokeless tobacco: Never   Vaping Use    Vaping status: Never Used   Substance and Sexual Activity    Alcohol use: No    Drug use: No   Other Topics Concern    Caffeine Concern No

## 2025-05-19 ENCOUNTER — TELEPHONE (OUTPATIENT)
Dept: NEPHROLOGY | Facility: CLINIC | Age: 85
End: 2025-05-19

## 2025-05-19 NOTE — TELEPHONE ENCOUNTER
Dr Hernadez please see note below spoke to patient  and last name verified reported had mole removed a week ago and is prescribed  Keflex  will call for the dose  if not  pt dermatologist is asking if ok with Clindamycin or Doxycycline .

## 2025-05-19 NOTE — TELEPHONE ENCOUNTER
Spoke with patient; prescribed keflex 500 mg- 3 times a day for mole removal  Stated derm can decrease dose to keflex 500 mg 2 times a day if needed.    Patient to start dose by Friday 5/23/25

## 2025-05-19 NOTE — TELEPHONE ENCOUNTER
The patient called to speak with a nurse in regards to asking what antibiotics the patient can take. Please call.

## 2025-05-19 NOTE — TELEPHONE ENCOUNTER
Patient is returning RN's call.  She states that the medication was Keflex 500 mg Three times a day but states that the Dermatologist said she can drop it to Twice a day.  Patient is requesting to speak with an RN.   Please call

## 2025-07-10 NOTE — TELEPHONE ENCOUNTER
Bhavesh faxed refill request for Famotidine  40 mg tabs.   Take 1 tablet (40 mg) by moth twice daily      Qty  180   Last filled 4-9-2025           Pt. Has an appt. With Dr. Don on 7/31/25.

## 2025-07-15 RX ORDER — FAMOTIDINE 40 MG/1
40 TABLET, FILM COATED ORAL 2 TIMES DAILY
Qty: 180 TABLET | Refills: 0 | Status: SHIPPED | OUTPATIENT
Start: 2025-07-15

## 2025-07-15 NOTE — TELEPHONE ENCOUNTER
Refill passed per [facility] protocols.    07/15/25  Former [facility] patient. Courtesy refill given.    Requested Prescriptions   Pending Prescriptions Disp Refills    famotidine 40 MG Oral Tab 180 tablet 0     Sig: Take 1 tablet (40 mg total) by mouth 2 (two) times daily.       Gastrointestional Medication Protocol Passed - 7/15/2025  2:57 PM        Passed - In person appointment or virtual visit in the past 12 mos or appointment in next 3 mos        Passed - Medication is active on med list

## 2025-07-29 ENCOUNTER — RESULTS FOLLOW-UP (OUTPATIENT)
Dept: INTERNAL MEDICINE CLINIC | Facility: CLINIC | Age: 85
End: 2025-07-29

## 2025-07-29 ENCOUNTER — HOSPITAL ENCOUNTER (OUTPATIENT)
Dept: MAMMOGRAPHY | Facility: HOSPITAL | Age: 85
Discharge: HOME | End: 2025-07-29
Attending: INTERNAL MEDICINE

## 2025-07-29 DIAGNOSIS — R92.8 ABNORMAL SCREENING MAMMOGRAM: ICD-10-CM

## 2025-07-29 PROCEDURE — 77062 BREAST TOMOSYNTHESIS BI: CPT | Performed by: INTERNAL MEDICINE

## 2025-07-29 PROCEDURE — 77066 DX MAMMO INCL CAD BI: CPT | Performed by: INTERNAL MEDICINE

## 2025-07-31 ENCOUNTER — OFFICE VISIT (OUTPATIENT)
Age: 85
End: 2025-07-31
Payer: MEDICARE

## 2025-07-31 VITALS
WEIGHT: 174 LBS | HEART RATE: 67 BPM | DIASTOLIC BLOOD PRESSURE: 56 MMHG | HEIGHT: 61 IN | BODY MASS INDEX: 32.85 KG/M2 | SYSTOLIC BLOOD PRESSURE: 108 MMHG | OXYGEN SATURATION: 95 %

## 2025-07-31 DIAGNOSIS — N18.30 STAGE 3 CHRONIC KIDNEY DISEASE, UNSPECIFIED WHETHER STAGE 3A OR 3B CKD (HCC): ICD-10-CM

## 2025-07-31 DIAGNOSIS — K22.719 BARRETT'S ESOPHAGUS WITH DYSPLASIA: ICD-10-CM

## 2025-07-31 DIAGNOSIS — R60.0 BILATERAL LEG EDEMA: ICD-10-CM

## 2025-07-31 DIAGNOSIS — I10 HYPERTENSION, BENIGN: ICD-10-CM

## 2025-07-31 DIAGNOSIS — Z86.73 HISTORY OF STROKE: ICD-10-CM

## 2025-07-31 DIAGNOSIS — R91.1 PULMONARY NODULE: ICD-10-CM

## 2025-07-31 DIAGNOSIS — I82.402 DEEP VEIN THROMBOSIS (DVT) OF LEFT LOWER EXTREMITY, UNSPECIFIED CHRONICITY, UNSPECIFIED VEIN (HCC): Primary | ICD-10-CM

## 2025-07-31 DIAGNOSIS — Z79.01 CHRONIC ANTICOAGULATION: ICD-10-CM

## 2025-07-31 PROBLEM — M25.562 ACUTE PAIN OF LEFT KNEE: Status: RESOLVED | Noted: 2017-06-09 | Resolved: 2025-07-31

## 2025-07-31 PROBLEM — Z01.818 PRE-OP TESTING: Status: RESOLVED | Noted: 2023-07-28 | Resolved: 2025-07-31

## 2025-07-31 PROBLEM — M19.049 ARTHRITIS OF HAND: Status: RESOLVED | Noted: 2022-03-21 | Resolved: 2025-07-31

## 2025-07-31 PROBLEM — M25.511 ACUTE PAIN OF RIGHT SHOULDER: Status: RESOLVED | Noted: 2018-06-11 | Resolved: 2025-07-31

## 2025-07-31 PROBLEM — M75.121 NONTRAUMATIC COMPLETE TEAR OF RIGHT ROTATOR CUFF: Status: RESOLVED | Noted: 2023-06-05 | Resolved: 2025-07-31

## 2025-07-31 PROBLEM — N30.01 ACUTE CYSTITIS WITH HEMATURIA: Status: RESOLVED | Noted: 2020-12-18 | Resolved: 2025-07-31

## 2025-07-31 PROBLEM — S16.1XXA CERVICAL MUSCLE STRAIN: Status: RESOLVED | Noted: 2019-01-04 | Resolved: 2025-07-31

## 2025-07-31 PROBLEM — J40 BRONCHITIS: Status: RESOLVED | Noted: 2019-01-04 | Resolved: 2025-07-31

## 2025-07-31 PROBLEM — M79.672 LEFT FOOT PAIN: Status: RESOLVED | Noted: 2021-04-26 | Resolved: 2025-07-31

## 2025-07-31 PROBLEM — R22.43 LOCALIZED SWELLING OF BOTH LOWER LEGS: Status: RESOLVED | Noted: 2021-06-28 | Resolved: 2025-07-31

## 2025-07-31 PROBLEM — R05.9 COUGH: Status: RESOLVED | Noted: 2024-09-25 | Resolved: 2025-07-31

## 2025-07-31 PROBLEM — B02.9 HERPES ZOSTER WITHOUT COMPLICATION: Status: RESOLVED | Noted: 2020-06-23 | Resolved: 2025-07-31

## 2025-07-31 PROBLEM — Z01.818 PRE-OP EVALUATION: Status: RESOLVED | Noted: 2023-06-05 | Resolved: 2025-07-31

## 2025-07-31 PROBLEM — Z00.00 ENCOUNTER FOR MEDICARE ANNUAL WELLNESS EXAM: Status: RESOLVED | Noted: 2022-08-03 | Resolved: 2025-07-31

## 2025-07-31 PROBLEM — M54.2 ANTERIOR NECK PAIN: Status: RESOLVED | Noted: 2018-03-09 | Resolved: 2025-07-31

## 2025-07-31 PROCEDURE — 99204 OFFICE O/P NEW MOD 45 MIN: CPT | Performed by: INTERNAL MEDICINE

## 2025-07-31 RX ORDER — ATORVASTATIN CALCIUM 20 MG/1
20 TABLET, FILM COATED ORAL NIGHTLY
Qty: 90 TABLET | Refills: 3 | Status: SHIPPED | OUTPATIENT
Start: 2025-07-31

## 2025-08-12 ENCOUNTER — TELEPHONE (OUTPATIENT)
Dept: NEPHROLOGY | Facility: CLINIC | Age: 85
End: 2025-08-12

## 2025-08-22 ENCOUNTER — LAB ENCOUNTER (OUTPATIENT)
Dept: LAB | Facility: HOSPITAL | Age: 85
End: 2025-08-22
Attending: INTERNAL MEDICINE

## 2025-08-22 DIAGNOSIS — N18.30 STAGE 3 CHRONIC KIDNEY DISEASE, UNSPECIFIED WHETHER STAGE 3A OR 3B CKD (HCC): ICD-10-CM

## 2025-08-22 LAB
ALBUMIN SERPL-MCNC: 4.2 G/DL (ref 3.2–4.8)
ANION GAP SERPL CALC-SCNC: 9 MMOL/L (ref 0–18)
BASOPHILS # BLD AUTO: 0.06 X10(3) UL (ref 0–0.2)
BASOPHILS NFR BLD AUTO: 0.6 %
BUN BLD-MCNC: 19 MG/DL (ref 9–23)
BUN/CREAT SERPL: 14.6 (ref 10–20)
CALCIUM BLD-MCNC: 9.4 MG/DL (ref 8.7–10.4)
CHLORIDE SERPL-SCNC: 110 MMOL/L (ref 98–112)
CO2 SERPL-SCNC: 22 MMOL/L (ref 21–32)
CREAT BLD-MCNC: 1.3 MG/DL (ref 0.55–1.02)
CREAT UR-SCNC: 367.9 MG/DL
DEPRECATED RDW RBC AUTO: 43.6 FL (ref 35.1–46.3)
EGFRCR SERPLBLD CKD-EPI 2021: 41 ML/MIN/1.73M2 (ref 60–?)
EOSINOPHIL # BLD AUTO: 0.48 X10(3) UL (ref 0–0.7)
EOSINOPHIL NFR BLD AUTO: 4.9 %
ERYTHROCYTE [DISTWIDTH] IN BLOOD BY AUTOMATED COUNT: 12.6 % (ref 11–15)
GLUCOSE BLD-MCNC: 104 MG/DL (ref 70–99)
HCT VFR BLD AUTO: 39.5 % (ref 35–48)
HGB BLD-MCNC: 13.4 G/DL (ref 12–16)
IMM GRANULOCYTES # BLD AUTO: 0.02 X10(3) UL (ref 0–1)
IMM GRANULOCYTES NFR BLD: 0.2 %
LYMPHOCYTES # BLD AUTO: 2.63 X10(3) UL (ref 1–4)
LYMPHOCYTES NFR BLD AUTO: 26.6 %
MCH RBC QN AUTO: 31.8 PG (ref 26–34)
MCHC RBC AUTO-ENTMCNC: 33.9 G/DL (ref 31–37)
MCV RBC AUTO: 93.6 FL (ref 80–100)
MONOCYTES # BLD AUTO: 1.04 X10(3) UL (ref 0.1–1)
MONOCYTES NFR BLD AUTO: 10.5 %
NEUTROPHILS # BLD AUTO: 5.66 X10 (3) UL (ref 1.5–7.7)
NEUTROPHILS # BLD AUTO: 5.66 X10(3) UL (ref 1.5–7.7)
NEUTROPHILS NFR BLD AUTO: 57.2 %
OSMOLALITY SERPL CALC.SUM OF ELEC: 295 MOSM/KG (ref 275–295)
PHOSPHATE SERPL-MCNC: 3.7 MG/DL (ref 2.4–5.1)
PLATELET # BLD AUTO: 310 10(3)UL (ref 150–450)
POTASSIUM SERPL-SCNC: 4 MMOL/L (ref 3.5–5.1)
PROT UR-MCNC: 48.7 MG/DL (ref ?–14)
PROT/CREAT UR-RTO: 0.13
PTH-INTACT SERPL-MCNC: 155.1 PG/ML (ref 18.5–88)
RBC # BLD AUTO: 4.22 X10(6)UL (ref 3.8–5.3)
SODIUM SERPL-SCNC: 141 MMOL/L (ref 136–145)
WBC # BLD AUTO: 9.9 X10(3) UL (ref 4–11)

## 2025-08-22 PROCEDURE — 83970 ASSAY OF PARATHORMONE: CPT

## 2025-08-22 PROCEDURE — 80069 RENAL FUNCTION PANEL: CPT

## 2025-08-22 PROCEDURE — 36415 COLL VENOUS BLD VENIPUNCTURE: CPT

## 2025-08-22 PROCEDURE — 84156 ASSAY OF PROTEIN URINE: CPT

## 2025-08-22 PROCEDURE — 82570 ASSAY OF URINE CREATININE: CPT

## 2025-08-22 PROCEDURE — 85025 COMPLETE CBC W/AUTO DIFF WBC: CPT

## 2025-08-27 ENCOUNTER — OFFICE VISIT (OUTPATIENT)
Facility: CLINIC | Age: 85
End: 2025-08-27

## 2025-08-27 VITALS
DIASTOLIC BLOOD PRESSURE: 75 MMHG | BODY MASS INDEX: 32.85 KG/M2 | HEIGHT: 61 IN | WEIGHT: 174 LBS | SYSTOLIC BLOOD PRESSURE: 120 MMHG | HEART RATE: 88 BPM

## 2025-08-27 DIAGNOSIS — N18.30 HYPERTENSIVE KIDNEY DISEASE WITH STAGE 3 CHRONIC KIDNEY DISEASE, UNSPECIFIED WHETHER STAGE 3A OR 3B CKD (HCC): ICD-10-CM

## 2025-08-27 DIAGNOSIS — E87.6 HYPOKALEMIA: ICD-10-CM

## 2025-08-27 DIAGNOSIS — N18.30 STAGE 3 CHRONIC KIDNEY DISEASE, UNSPECIFIED WHETHER STAGE 3A OR 3B CKD (HCC): Primary | ICD-10-CM

## 2025-08-27 DIAGNOSIS — N25.81 SECONDARY HYPERPARATHYROIDISM (HCC): ICD-10-CM

## 2025-08-27 DIAGNOSIS — R31.29 MICROSCOPIC HEMATURIA: ICD-10-CM

## 2025-08-27 DIAGNOSIS — I12.9 HYPERTENSIVE KIDNEY DISEASE WITH STAGE 3 CHRONIC KIDNEY DISEASE, UNSPECIFIED WHETHER STAGE 3A OR 3B CKD (HCC): ICD-10-CM

## 2025-08-27 PROCEDURE — 99214 OFFICE O/P EST MOD 30 MIN: CPT | Performed by: INTERNAL MEDICINE

## 2025-08-28 ENCOUNTER — HOSPITAL ENCOUNTER (OUTPATIENT)
Dept: CT IMAGING | Facility: HOSPITAL | Age: 85
Discharge: HOME | End: 2025-08-28
Attending: INTERNAL MEDICINE

## 2025-08-28 DIAGNOSIS — R91.1 PULMONARY NODULE: ICD-10-CM

## 2025-08-28 PROCEDURE — 71250 CT THORAX DX C-: CPT | Performed by: INTERNAL MEDICINE

## (undated) DIAGNOSIS — I10 ESSENTIAL HYPERTENSION: Primary | ICD-10-CM

## (undated) DIAGNOSIS — M19.042 PRIMARY OSTEOARTHRITIS OF BOTH HANDS: Primary | ICD-10-CM

## (undated) DIAGNOSIS — M19.041 PRIMARY OSTEOARTHRITIS OF BOTH HANDS: Primary | ICD-10-CM

## (undated) DEVICE — CLIP LGT 11MM OPEN 2.8MM 235CM

## (undated) DEVICE — GAMMEX® PI HYBRID SIZE 7.5, STERILE POWDER-FREE SURGICAL GLOVE, POLYISOPRENE AND NEOPRENE BLEND: Brand: GAMMEX

## (undated) DEVICE — 6 ML SYRINGE LUER-LOCK TIP: Brand: MONOJECT

## (undated) DEVICE — REM POLYHESIVE ADULT PATIENT RETURN ELECTRODE: Brand: VALLEYLAB

## (undated) DEVICE — 3M™ STERI-DRAPE™ INSTRUMENT POUCH 1018: Brand: STERI-DRAPE™

## (undated) DEVICE — MINOR GENERAL: Brand: MEDLINE INDUSTRIES, INC.

## (undated) DEVICE — 35 ML SYRINGE REGULAR TIP: Brand: MONOJECT

## (undated) DEVICE — SOL NACL IRRIG 0.9% 1000ML BTL

## (undated) DEVICE — CURVED, SMALL JAW, OPEN SEALER/DIVIDER: Brand: LIGASURE

## (undated) DEVICE — SKIN PREP TRAY 4 COMPARTM TRAY: Brand: MEDLINE INDUSTRIES, INC.

## (undated) DEVICE — LEGGINGS SRG 48X31IN CUF STRL

## (undated) DEVICE — SOL H2O IRRIGATION 3000ML

## (undated) DEVICE — SUT PDS II 2-0 CT-2 Z333H

## (undated) DEVICE — 1016 S-DRAPE IRRIG POUCH 10/BOX: Brand: STERI-DRAPE™

## (undated) DEVICE — SUT VICRYL 0 CT-1 J340H

## (undated) DEVICE — PAD ALC 43.5X24IN PREP  LF

## (undated) DEVICE — NEEDLE EPDRL 17GA 6IN TUO TUO

## (undated) DEVICE — Device: Brand: JELCO

## (undated) DEVICE — LINE MNTR ADLT SET O2 INTMD

## (undated) DEVICE — SNARE CAPTIFLEX MICRO-OVL OLY

## (undated) DEVICE — MEDI-VAC NON-CONDUCTIVE SUCTION TUBING: Brand: CARDINAL HEALTH

## (undated) DEVICE — LONE STAR LARGE RING RETRACTOR

## (undated) DEVICE — TRAP MCS 40ML 5IN PLS SCR CAP

## (undated) DEVICE — SYRINGE 10ML LL CONTRL SYRINGE

## (undated) DEVICE — RETRACTOR LONE STAR STAYS BLNT

## (undated) DEVICE — Device: Brand: DEFENDO AIR/WATER/SUCTION AND BIOPSY VALVE

## (undated) DEVICE — GOWN SURGICAL MICROCOOL XL LNG

## (undated) DEVICE — TRAY SURESTEP 16 BARDEX DRAIN

## (undated) DEVICE — SUT VICRYL 0 CT-2 J727D

## (undated) DEVICE — MEDI-VAC NON-CONDUCTIVE SUCTION TUBING 6MM X 1.8M (6FT.) L: Brand: CARDINAL HEALTH

## (undated) DEVICE — DRAPE SHEET LARGE 76X55

## (undated) DEVICE — COVER STND 54X23IN MAYO REINF

## (undated) DEVICE — CONTAINER,SPECIMEN,OR STERILE,4OZ: Brand: MEDLINE

## (undated) DEVICE — SOL  0.9% 1000ML

## (undated) DEVICE — STERILE LATEX POWDER-FREE SURGICAL GLOVESWITH NITRILE COATING: Brand: PROTEXIS

## (undated) DEVICE — GAMMEX® PI HYBRID SIZE 6.5, STERILE POWDER-FREE SURGICAL GLOVE, POLYISOPRENE AND NEOPRENE BLEND: Brand: GAMMEX

## (undated) DEVICE — Device: Brand: CUSTOM PROCEDURE KIT

## (undated) DEVICE — VIOLET BRAIDED (POLYGLACTIN 910), SYNTHETIC ABSORBABLE SUTURE: Brand: COATED VICRYL

## (undated) DEVICE — GAMMEX® PI HYBRID SIZE 7, STERILE POWDER-FREE SURGICAL GLOVE, POLYISOPRENE AND NEOPRENE BLEND: Brand: GAMMEX

## (undated) DEVICE — STERILE SURGICAL LUBRICANT, METAL TUBE: Brand: SURGILUBE

## (undated) DEVICE — STANDARD HYPODERMIC NEEDLE,POLYPROPYLENE HUB: Brand: MONOJECT

## (undated) DEVICE — DRAPE,UNDRBUT,WHT GRAD PCH,CAPPORT,20/CS: Brand: MEDLINE

## (undated) DEVICE — FLEXIBLE YANKAUER,MEDIUM TIP, NO VACUUM CONTROL: Brand: ARGYLE

## (undated) DEVICE — DRAPE SRG 131X112X63IN GYN URO

## (undated) DEVICE — TUBING CYSTO TUR DUAL

## (undated) DEVICE — 3 ML SYRINGE LUER-LOCK TIP: Brand: MONOJECT

## (undated) NOTE — MR AVS SNAPSHOT
FRANSISCO Saltese  [facility] [address] [place] [address]  [phone]               Thank you for choosing us for your health care visit with Juan Ulloa. MD Adrienne.  We are glad to serve you and happy to provide you with this summary of your visit.   [facility] Issues Discussed Today     Hypercholesterolemia    Hypertension, benign    Low back pain    Pulmonary embolism ([facility] [address]      Instructions and Information about Your Health     None      Allergies as of Apr 17, 2017     Ibuprofen     Radiology Contrast

## (undated) NOTE — LETTER
10/2/2019        Toni Grant. Chris Can MD  [address] [address]        Re:  Ilean Masker   10/18/1940   [id number]       Dear Landy Morris,    I had the pleasure of seeing Ilean Masker for a change in bowel habits.     She underwent colonoscopy at ulcerations, colitis, vascular anomalies or mass lesions.    Digital rectal exam was normal.     Plan:  High fiber diet  Await biopsy results     Specimens: polyps  EBL: minimal     Aronchick bowel prep scale:  5 Inadequate (repeat preparation needed)  4 Po polyp” and consists of three fragments of tan-pink colored soft tissue measuring in aggregate 0.4 x 0.3 x 0.2 cm.  The specimen is entirely submitted in one cassette.      Specimen B is submitted in formalin labeled “Danni, ascending colon polyp” and consis

## (undated) NOTE — LETTER
Hospital Discharge Documentation  Please phone to schedule a hospital follow up appointment.     From: [address] Hospitalist's Office  Phone: [phone]    Patient discharged time/date: 7/24/2020  3:23 PM  Patient discharge disposition:  Home or Self Respiratory: Clear to auscultation bilaterally. No wheezes. No rhonchi. Cardiovascular: S1, S2. Regular rate and rhythm. No murmurs, rubs or gallops. Abdomen: Soft, nontender, nondistended. Positive bowel sounds. No rebound or guarding.   Neurologic: No Take 1 tablet by mouth 2 (two) times daily.                 Discharge Diet: Cardiac diet     Discharge Activity: As tolerated       Discharge Medications      START taking these medications      Instructions Prescription details   aspirin 81 MG Tbec      Ta Take 2 tablets by mouth one day alternating with 1 tablet the next day   Quantity:  135 tablet  Refills:  3           Where to Get Your Medications      Please  your prescriptions at the location directed by your doctor or nurse    Bring a paper pr

## (undated) NOTE — ED AVS SNAPSHOT
Wil Warner   MRN: [id number]    Department:  [facility] Emergency Department   Date of Visit:  10/1/2018           Disclosure     Insurance plans vary and the physician(s) referred by the ER may not be covered by your plan.  Please contact y within the next three months to obtain basic health screening including reassessment of your blood pressure.     IF THERE IS ANY CHANGE OR WORSENING OF YOUR CONDITION, CALL YOUR PRIMARY CARE PHYSICIAN AT ONCE OR RETURN IMMEDIATELY TO THE EMERGENCY DEPARTMEN

## (undated) NOTE — LETTER
May 4, 2021         [address] [id number]      Patient: Emili Tripp   YOB: 1940   Date of Visit: 5/4/2021       Dear Dr. Ryne Vasquez MD,    I saw your patient, Emili Tripp, on 5/4/2021.  Enclosed

## (undated) NOTE — MR AVS SNAPSHOT
[place] [address] [address] [place], [address]  [phone]               Thank you for choosing us for your health care visit with Vandana Deleon MD.  We are glad to serve you and happy to provide you with taking this medication, and follow the directions you see here. * Omeprazole 20 MG Tbec   Take 20 mg by mouth daily. What changed:  Another medication with the same name was removed.  Continue taking this medication, and follow the directions yo

## (undated) NOTE — ED AVS SNAPSHOT
Gagandeep Michael   MRN: [id number]    Department:  [facility] Emergency Department   Date of Visit:  6/6/2018           Disclosure     Insurance plans vary and the physician(s) referred by the ER may not be covered by your plan.  Please contact yo within the next three months to obtain basic health screening including reassessment of your blood pressure.     IF THERE IS ANY CHANGE OR WORSENING OF YOUR CONDITION, CALL YOUR PRIMARY CARE PHYSICIAN AT ONCE OR RETURN IMMEDIATELY TO THE EMERGENCY DEPARTMEN

## (undated) NOTE — Clinical Note
Zehra Graff, I saw this very nice patient of yours with the unprovoked left calf DVT.  I agree with you about the need for indefinite anticoagulation.  She is on aspirin 81 mg daily and I was wondering if you'd consider stopping it, to minimize her risk of bleeding. She has no prior CVA or CAD. Thanks! Lorenzo